# Patient Record
Sex: MALE | Race: WHITE | NOT HISPANIC OR LATINO | Employment: OTHER | URBAN - METROPOLITAN AREA
[De-identification: names, ages, dates, MRNs, and addresses within clinical notes are randomized per-mention and may not be internally consistent; named-entity substitution may affect disease eponyms.]

---

## 2017-01-09 RX ORDER — MELOXICAM 15 MG/1
TABLET ORAL
Qty: 90 TABLET | Refills: 2 | Status: SHIPPED | OUTPATIENT
Start: 2017-01-09 | End: 2017-01-16

## 2017-01-13 ENCOUNTER — OFFICE VISIT (OUTPATIENT)
Dept: FAMILY MEDICINE CLINIC | Facility: CLINIC | Age: 77
End: 2017-01-13

## 2017-01-13 VITALS
HEIGHT: 68 IN | SYSTOLIC BLOOD PRESSURE: 110 MMHG | WEIGHT: 209.4 LBS | DIASTOLIC BLOOD PRESSURE: 64 MMHG | BODY MASS INDEX: 31.74 KG/M2 | TEMPERATURE: 97.9 F | HEART RATE: 87 BPM | OXYGEN SATURATION: 96 % | RESPIRATION RATE: 16 BRPM

## 2017-01-13 DIAGNOSIS — R68.83 CHILLS: ICD-10-CM

## 2017-01-13 DIAGNOSIS — I95.9 HYPOTENSION, UNSPECIFIED: ICD-10-CM

## 2017-01-13 DIAGNOSIS — I10 ESSENTIAL HYPERTENSION: ICD-10-CM

## 2017-01-13 DIAGNOSIS — R42 DIZZINESS: Primary | ICD-10-CM

## 2017-01-13 LAB
BILIRUB BLD-MCNC: NEGATIVE MG/DL
CLARITY, POC: CLEAR
COLOR UR: YELLOW
GLUCOSE UR STRIP-MCNC: NEGATIVE MG/DL
HCT VFR BLDA CALC: 44.4 %
HGB BLDA-MCNC: 14.5 G/DL
KETONES UR QL: NEGATIVE
LEUKOCYTE EST, POC: NEGATIVE
MCH, POC: 30.3
MCHC, POC: 32.6
MCV, POC: 93
NITRITE UR-MCNC: NEGATIVE MG/ML
PH UR: 6 [PH] (ref 5–8)
PLATELET # BLD AUTO: 175 10*3/MM3
PMV BLD: 8.1 FL
PROT UR STRIP-MCNC: NEGATIVE MG/DL
RBC # UR STRIP: NEGATIVE /UL
RBC, POC: 4.77
RDW, POC: 12.5
SP GR UR: 1.01 (ref 1–1.03)
UROBILINOGEN UR QL: NORMAL
WBC # BLD: 6.7 10*3/UL

## 2017-01-13 PROCEDURE — 81003 URINALYSIS AUTO W/O SCOPE: CPT | Performed by: FAMILY MEDICINE

## 2017-01-13 PROCEDURE — 85027 COMPLETE CBC AUTOMATED: CPT | Performed by: FAMILY MEDICINE

## 2017-01-13 PROCEDURE — 71020 CHG CHEST X-RAY 2 VW: CPT | Performed by: FAMILY MEDICINE

## 2017-01-13 PROCEDURE — 99214 OFFICE O/P EST MOD 30 MIN: CPT | Performed by: FAMILY MEDICINE

## 2017-01-13 NOTE — PROGRESS NOTES
Subjective   Mikey Crawley is a 76 y.o. male.     Chief Complaint   Patient presents with   • Dizziness     shakes, no fever.  low b/p last p.m.        History of Present Illness    76-year-old male with a history of sick sinus syndrome, pacemaker, hypertension, presents with feeling dizziness and not quite himself for some time.  However over the last view days his blood pressure is been lower.  But 120/70 at times.  Sometimes a little bit lower.  He's had some dizziness when bending over and then standing back up.  Feeling lightheaded.  He is also over the last couple days has had severe episodes of shaking chills.  He has noted some changes in urination, little trouble going more than normal.  He's had no cough.  No cold or upper respiratory symptoms.  No nausea.  No vomiting.  He feels a little clammy at times.  No known fever.  He denies chest pain or trouble breathing.  He went to the dentist couple days ago.  He has some left upper tooth pain.  No sinus drainage.  No sinus congestion.  No postnasal drip.  He's had his pacemaker checked recently was normal.  Long history of hand arthritis symptoms.  He takes no back daily.  History of lower urinary tract symptoms.  He takes finasteride and tamsulosin.  History of hypertension.  Takes Tenoretic and Chikis.      The following portions of the patient's history were reviewed and updated as appropriate: allergies, current medications, past family history, past medical history, past social history, past surgical history and problem list.          Review of Systems   Constitutional: Positive for chills. Negative for fever.   HENT: Negative.    Respiratory: Negative for cough and shortness of breath.    Cardiovascular: Negative for chest pain, palpitations and leg swelling.   Gastrointestinal: Negative for abdominal pain, blood in stool, constipation, diarrhea, nausea and vomiting.   Endocrine: Positive for cold intolerance.   Genitourinary: Positive for difficulty  "urinating. Negative for dysuria and frequency.   Musculoskeletal: Positive for myalgias.   Skin: Negative.    Neurological: Positive for dizziness and light-headedness. Negative for weakness and headaches.   Psychiatric/Behavioral: Negative.        Objective   Blood pressure 110/64, pulse 87, temperature 97.9 °F (36.6 °C), temperature source Oral, resp. rate 16, height 67.5\" (171.5 cm), weight 209 lb 6.4 oz (95 kg), SpO2 96 %.  Physical Exam   Constitutional: He is oriented to person, place, and time. He appears distressed (slight diaphoresis looks tired and a little pale).   HENT:   Head: Normocephalic and atraumatic.   Mouth/Throat: Oropharynx is clear and moist.   Minimal pain to palpation maxillary sinus area.   Eyes: Conjunctivae and EOM are normal. Pupils are equal, round, and reactive to light.   Neck: Normal range of motion. Neck supple. No thyromegaly present.   Cardiovascular: Normal rate, regular rhythm and normal heart sounds.    No murmur heard.  Pulmonary/Chest: Effort normal and breath sounds normal. No respiratory distress. He has no wheezes. He has no rales. He exhibits no tenderness.   Abdominal: Soft. Bowel sounds are normal. He exhibits no distension and no mass. There is no tenderness. There is no rebound and no guarding.   Musculoskeletal: Normal range of motion. He exhibits no edema.   Lymphadenopathy:     He has no cervical adenopathy.   Neurological: He is alert and oriented to person, place, and time. No cranial nerve deficit. He exhibits normal muscle tone. Coordination normal.    Neurological exam.  Pupils equal and reactive to light.  Extra ocular muscle movements intact all directions.  Face symmetric.  Gait unremarkable.  No ataxia.  No dysmetria.  Negative [place]-Hallpike.  No nystagmus.     Skin: Skin is warm. He is diaphoretic. There is pallor (???).      blood pressure 114/64 sitting and 110/64 standing.  No change in pulse.    CBC within normal limits.  White count 6700.  Hemoglobin " 14.5.  Platelet count normal and 175,000.    Urinalysis today is negative.  Negative nitrites.  Negative leukocytes.  Negative protein.  No blood.    Procedures  Chest x-ray.  Indication chills and dizziness.  No prior to compare.  There is cardiomegaly noted.  Pacemaker is noted.  There is no infiltrate.  No effusion.  Bony structures appear normal.  Final report from radiology is pending.      Assessment/Plan   Mikey was seen today for dizziness.    Diagnoses and all orders for this visit:    Dizziness  -     POC Urinalysis Dipstick, Automated  -     POC CBC  -     XR Chest PA & Lateral    Chills  -     POC Urinalysis Dipstick, Automated  -     POC CBC  -     XR Chest PA & Lateral    Hypotension, unspecified    76-year-old male with a two-week history of nonspecific dizziness and a 2 day history of intermittent chills and shakes with relative hypotension.  On examination, evidence of diaphoresis but otherwise unremarkable physical examination.  CBC within normal limits.  Chest x-ray shows no acute disease.  Urinalysis normal.  At this time I believe his hypertension is overtreated.  I cannot completely rule out an occult pathological process.  From a cardiovascular review of systems and history, there is no evidence of acute coronary syndrome.  No signs of neurological process.  At this point I'm recommending stopping the Tenoretic.  Continuing the Chikis.  I'm recommending a follow-up with his primary care physician or myself in 72 hours.  I'm also recommending that the patient go to the emergency room or call 911 with headaches, chest pain, fever, severe shakes and chills, worsening lightheadedness, or other severe concerns.

## 2017-01-13 NOTE — PATIENT INSTRUCTIONS
Stop Tenoretic    Drink plenty of fluids and get rest this weekend    Go to the emergency room or call 911 with headaches, chest pain, fever, severe shakes and chills, worsening lightheadedness, or other severe concerns.

## 2017-01-13 NOTE — MR AVS SNAPSHOT
Mikey Crawley   1/13/2017 1:00 PM   Office Visit    Dept Phone:  [phone]   Encounter #:  [id number]    Provider:  Sotero Torres MD   Department:  [facility] PRIMARY CARE                Your Full Care Plan              Today's Medication Changes          These changes are accurate as of: 1/13/17  2:36 PM.  If you have any questions, ask your nurse or doctor.               Stop taking medication(s)listed here:     azithromycin 250 MG tablet   Commonly known as:  ZITHROMAX Z-ALIREZA   Stopped by:  Sotero Torres MD           dexamethasone 4 MG tablet   Commonly known as:  DECADRON   Stopped by:  Sotero Torres MD                      Your Updated Medication List          This list is accurate as of: 1/13/17  2:36 PM.  Always use your most recent med list.                atenolol-chlorthalidone 50-25 MG per tablet   Commonly known as:  TENORETIC   TAKE 1 TABLET DAILY       AMAYA 5-40 MG per tablet   Generic drug:  amlodipine-olmesartan   TAKE 1 TABLET DAILY       finasteride 5 MG tablet   Commonly known as:  PROSCAR       FLOMAX 0.4 MG capsule 24 hr capsule   Generic drug:  tamsulosin       meloxicam 15 MG tablet   Commonly known as:  MOBIC   TAKE 1 TABLET DAILY AS NEEDED FOR MILD PAIN (1-3)       rosuvastatin 5 MG tablet   Commonly known as:  CRESTOR   Take 1 tablet (5 mg total) by mouth 3 (three) times a week.               We Performed the Following     POC CBC     POC Urinalysis Dipstick, Automated     XR Chest PA & Lateral       You Were Diagnosed With        Codes Comments    Dizziness    -  Primary ICD-10-CM: R42  ICD-9-CM: 780.4     Chills     ICD-10-CM: R68.83  ICD-9-CM: 780.64     Hypotension, unspecified     ICD-10-CM: I95.9  ICD-9-CM: 458.9     Essential hypertension     ICD-10-CM: I10  ICD-9-CM: 401.9       Instructions    Stop Tenoretic    Drink plenty of fluids and get rest this weekend    Go to the emergency room or call 911 with headaches, chest pain,  "fever, severe shakes and chills, worsening lightheadedness, or other severe concerns.       Patient Instructions History      Upcoming Appointments     Visit Type Date Time Department    OFFICE VISIT 1/13/2017  1:00 PM [facility]    OFFICE VISIT 1/16/2017  9:45 AM [facility]    FOLLOW UP 5/30/2017 11:30 AM [facility]      [employer] Signup     Our records indicate that you have an active [facility] [employer] account.    You can view your After Visit Summary by going to [employer] and logging in with your [employer] username and password.  If you don't have a [employer] username and password but a parent or guardian has access to your record, the parent or guardian should login with their own [employer] username and password and access your record to view the After Visit Summary.    If you have questions, you can email [email or web address] or call [phone] to talk to our [employer] staff.  Remember, [employer] is NOT to be used for urgent needs.  For medical emergencies, dial 911.               Other Info from Your Visit           Your Appointments     Jan 16, 2017  9:45 AM EST   Office Visit with Sotero Torres MD   [facility] PRIMARY CARE (--)    [address]  [facility] [address] [phone]           Arrive 15 minutes prior to appointment.            May 30, 2017 11:30 AM EDT   Follow Up with Tyler Emerson MD   [facility] NEUROSURGERY (--)    [address]  [facility] [address] [phone]           Arrive 15 minutes prior to appointment.              Allergies     No Known Allergies      Reason for Visit     Dizziness shakes, no fever.  low b/p last p.m.      Vital Signs     Blood Pressure Pulse Temperature Respirations Height Weight    110/64 (Patient Position: Standing) 87 97.9 °F (36.6 °C) (Oral) 16 67.5\" (171.5 cm) 209 lb 6.4 oz (95 kg)    Oxygen Saturation Body Mass Index Smoking Status          "    96% 32.31 kg/m2 Current Some Day Smoker         Problems and Diagnoses Noted     High blood pressure    Dizziness    -  Primary    Chills        Low blood pressure          Results     POC Urinalysis Dipstick, Automated      Component Value Standard Range & Units    Color Yellow Yellow, Straw, Dark Yellow, Cyndi    Clarity, UA Clear Clear    Glucose, UA Negative Negative, 1000 mg/dL (3+) mg/dL    Bilirubin Negative Negative    Ketones, UA Negative Negative    Specific Gravity  1.010 1.005 - 1.030    Blood, UA Negative Negative    pH, Urine 6.0 5.0 - 8.0    Protein, POC Negative Negative mg/dL    Urobilinogen, UA Normal Normal    Leukocytes Negative Negative    Nitrite, UA Negative Negative                POC CBC      Component Value Standard Range & Units    Hematocrit 44.4 %    Hemoglobin 14.5 g/dL    RBC 4.77     WBC 6.7     MCV 93.0     MCH 30.3     MCHC 32.6     RDW-CV 12.5     Platelets 175 10*3/mm3    MPV 8.1

## 2017-01-15 LAB
BACTERIA UR CULT: NO GROWTH
BACTERIA UR CULT: NORMAL

## 2017-01-16 ENCOUNTER — OFFICE VISIT (OUTPATIENT)
Dept: FAMILY MEDICINE CLINIC | Facility: CLINIC | Age: 77
End: 2017-01-16

## 2017-01-16 VITALS
HEART RATE: 80 BPM | DIASTOLIC BLOOD PRESSURE: 78 MMHG | SYSTOLIC BLOOD PRESSURE: 122 MMHG | WEIGHT: 211.2 LBS | TEMPERATURE: 97.7 F | RESPIRATION RATE: 16 BRPM | HEIGHT: 68 IN | BODY MASS INDEX: 32.01 KG/M2

## 2017-01-16 DIAGNOSIS — M15.9 PRIMARY OSTEOARTHRITIS INVOLVING MULTIPLE JOINTS: ICD-10-CM

## 2017-01-16 DIAGNOSIS — I10 ESSENTIAL HYPERTENSION: Primary | ICD-10-CM

## 2017-01-16 DIAGNOSIS — E78.00 PURE HYPERCHOLESTEROLEMIA: ICD-10-CM

## 2017-01-16 DIAGNOSIS — R42 DIZZINESS: ICD-10-CM

## 2017-01-16 LAB
ALBUMIN SERPL-MCNC: 4.7 G/DL (ref 3.5–5.2)
ALBUMIN/GLOB SERPL: 1.8 G/DL
ALP SERPL-CCNC: 58 U/L (ref 39–117)
ALT SERPL-CCNC: 28 U/L (ref 1–41)
AST SERPL-CCNC: 22 U/L (ref 1–40)
BASOPHILS # BLD AUTO: 0.03 10*3/MM3 (ref 0–0.2)
BASOPHILS NFR BLD AUTO: 0.5 % (ref 0–1.5)
BILIRUB SERPL-MCNC: 0.6 MG/DL (ref 0.1–1.2)
BUN SERPL-MCNC: 19 MG/DL (ref 8–23)
BUN/CREAT SERPL: 15.3 (ref 7–25)
CALCIUM SERPL-MCNC: 9.7 MG/DL (ref 8.6–10.5)
CHLORIDE SERPL-SCNC: 102 MMOL/L (ref 98–107)
CHOLEST SERPL-MCNC: 134 MG/DL (ref 0–200)
CO2 SERPL-SCNC: 27.1 MMOL/L (ref 22–29)
CREAT SERPL-MCNC: 1.24 MG/DL (ref 0.76–1.27)
EOSINOPHIL # BLD AUTO: 0.37 10*3/MM3 (ref 0–0.7)
EOSINOPHIL NFR BLD AUTO: 6.4 % (ref 0.3–6.2)
ERYTHROCYTE [DISTWIDTH] IN BLOOD BY AUTOMATED COUNT: 12.4 % (ref 11.5–14.5)
GLOBULIN SER CALC-MCNC: 2.6 GM/DL
GLUCOSE SERPL-MCNC: 106 MG/DL (ref 65–99)
HCT VFR BLD AUTO: 44.4 % (ref 40.4–52.2)
HDLC SERPL-MCNC: 38 MG/DL (ref 40–60)
HGB BLD-MCNC: 15.2 G/DL (ref 13.7–17.6)
IMM GRANULOCYTES # BLD: 0 10*3/MM3 (ref 0–0.03)
IMM GRANULOCYTES NFR BLD: 0 % (ref 0–0.5)
LDLC SERPL CALC-MCNC: 70 MG/DL (ref 0–100)
LYMPHOCYTES # BLD AUTO: 2.17 10*3/MM3 (ref 0.9–4.8)
LYMPHOCYTES NFR BLD AUTO: 37.5 % (ref 19.6–45.3)
MCH RBC QN AUTO: 32.1 PG (ref 27–32.7)
MCHC RBC AUTO-ENTMCNC: 34.2 G/DL (ref 32.6–36.4)
MCV RBC AUTO: 93.9 FL (ref 79.8–96.2)
MONOCYTES # BLD AUTO: 0.46 10*3/MM3 (ref 0.2–1.2)
MONOCYTES NFR BLD AUTO: 7.9 % (ref 5–12)
NEUTROPHILS # BLD AUTO: 2.76 10*3/MM3 (ref 1.9–8.1)
NEUTROPHILS NFR BLD AUTO: 47.7 % (ref 42.7–76)
PLATELET # BLD AUTO: 195 10*3/MM3 (ref 140–500)
POTASSIUM SERPL-SCNC: 4.1 MMOL/L (ref 3.5–5.2)
PROT SERPL-MCNC: 7.3 G/DL (ref 6–8.5)
RBC # BLD AUTO: 4.73 10*6/MM3 (ref 4.6–6)
SODIUM SERPL-SCNC: 143 MMOL/L (ref 136–145)
TRIGL SERPL-MCNC: 128 MG/DL (ref 0–150)
VLDLC SERPL CALC-MCNC: 25.6 MG/DL (ref 5–40)
WBC # BLD AUTO: 5.79 10*3/MM3 (ref 4.5–10.7)

## 2017-01-16 PROCEDURE — 99214 OFFICE O/P EST MOD 30 MIN: CPT | Performed by: FAMILY MEDICINE

## 2017-01-16 NOTE — PROGRESS NOTES
"Subjective   Mikey Crawley is a 76 y.o. male.     Chief Complaint   Patient presents with   • Hypertension        History of Present Illness    Follow-up episode of hypotension last week.  Long history of hypertension also pacemaker for sick sinus syndrome.  He was having some dizziness and shaking episodes.  He was having some vertigo symptoms at times related to head position, however the exam last week no nystagmus negative Claudia-Hallpike.  We stopped his Tenoretic.  This weekend he is feeling much better.  More energy level.  Less shakes.  Blood pressures running normal.  No low blood pressure episodes.  He has a history of osteoarthritis of multiple joints.  He stopped the meloxicam and my advice.    History of hyperlipidemia.  He is overdue for lipid panel.  Also due for other blood work.    The following portions of the patient's history were reviewed and updated as appropriate: allergies, current medications, past family history, past medical history, past social history, past surgical history and problem list.          Review of Systems   Constitutional: Negative for activity change and appetite change.   Respiratory: Negative for chest tightness and shortness of breath.    Cardiovascular: Negative for chest pain, palpitations and leg swelling.   Neurological: Negative for dizziness and headaches.   Psychiatric/Behavioral: Negative for confusion.       Objective   Blood pressure 122/78, pulse 80, temperature 97.7 °F (36.5 °C), temperature source Oral, resp. rate 16, height 67.5\" (171.5 cm), weight 211 lb 3.2 oz (95.8 kg), peak flow 98 L/min.  Physical Exam   Constitutional: He is oriented to person, place, and time. He appears well-developed and well-nourished. No distress.   Eyes: EOM are normal. Pupils are equal, round, and reactive to light.   Neck: No thyromegaly present.   Cardiovascular: Normal rate, regular rhythm, normal heart sounds and intact distal pulses.    Pulmonary/Chest: Effort normal and " breath sounds normal.   Musculoskeletal: He exhibits no edema.   Neurological: He is alert and oriented to person, place, and time. No cranial nerve deficit. Coordination normal.   Skin: Skin is warm and dry.   Psychiatric: He has a normal mood and affect. His behavior is normal. Judgment and thought content normal.   Nursing note and vitals reviewed.      Assessment/Plan   Mikey was seen today for hypertension.    Diagnoses and all orders for this visit:    Essential hypertension  -     Comprehensive Metabolic Panel  -     Lipid Panel    Pure hypercholesterolemia  -     Comprehensive Metabolic Panel  -     Lipid Panel    Dizziness  -     CBC & Differential  -     Ambulatory Referral to Physical Therapy Vestibular, Evaluate and treat    Primary osteoarthritis involving multiple joints      Hypertension.  Now with better control.  The relative hypotension has resolved.  He looks better.  His color is better.  He is no longer diaphoretic.  He will stay off the Tenoretic.  He will continue the camila.  I'll see him back in 2 weeks for recheck.  Blood work today.  Next    Dizziness.  Intermittent.  Probably secondary to his previous hypotension.  However there is some 9 positional vertigo-like quality to this.  I'm recommending a vestibular physical therapy evaluation.    Hyperlipidemia.  He continues his statin without complaint.  Checking lipid panel and CMP today.    Osteoarthritis multiple joints, especially the hands.  Stop daily meloxicam.  Over-the-counter Tylenol 2 tablets twice a day.  He may use meloxicam occasionally for pain a few times a month.  But not every day.

## 2017-01-16 NOTE — MR AVS SNAPSHOT
Mikey Crawley   1/16/2017 9:45 AM   Office Visit    Dept Phone:  [phone]   Encounter #:  [id number]    Provider:  Sotero Torres MD   Department:  [facility] PRIMARY CARE                Your Full Care Plan              Today's Medication Changes          These changes are accurate as of: 1/16/17 10:25 AM.  If you have any questions, ask your nurse or doctor.               Stop taking medication(s)listed here:     atenolol-chlorthalidone 50-25 MG per tablet   Commonly known as:  TENORETIC   Stopped by:  Sotero Torres MD           meloxicam 15 MG tablet   Commonly known as:  MOBIC   Stopped by:  Sotero Torres MD                      Your Updated Medication List          This list is accurate as of: 1/16/17 10:25 AM.  Always use your most recent med list.                AMAYA 5-40 MG per tablet   Generic drug:  amlodipine-olmesartan   TAKE 1 TABLET DAILY       finasteride 5 MG tablet   Commonly known as:  PROSCAR       FLOMAX 0.4 MG capsule 24 hr capsule   Generic drug:  tamsulosin       rosuvastatin 5 MG tablet   Commonly known as:  CRESTOR   Take 1 tablet (5 mg total) by mouth 3 (three) times a week.               We Performed the Following     Ambulatory Referral to Physical Therapy Vestibular, Evaluate and treat     CBC & Differential     Comprehensive Metabolic Panel     Lipid Panel       You Were Diagnosed With        Codes Comments    Essential hypertension    -  Primary ICD-10-CM: I10  ICD-9-CM: 401.9     Pure hypercholesterolemia     ICD-10-CM: E78.00  ICD-9-CM: 272.0     Dizziness     ICD-10-CM: R42  ICD-9-CM: 780.4     Primary osteoarthritis involving multiple joints     ICD-10-CM: M15.0  ICD-9-CM: 715.09       Instructions     None    Patient Instructions History      Upcoming Appointments     Visit Type Date Time Department    OFFICE VISIT 1/16/2017  9:45 AM [facility]    FOLLOW UP 5/30/2017 11:30 AM [facility] [employer] Signup     "   Our records indicate that you have an active [facility] [employer] account.    You can view your After Visit Summary by going to [employer] and logging in with your [employer] username and password.  If you don't have a [employer] username and password but a parent or guardian has access to your record, the parent or guardian should login with their own [employer] username and password and access your record to view the After Visit Summary.    If you have questions, you can email [email or web address] or call [phone] to talk to our [employer] staff.  Remember, [employer] is NOT to be used for urgent needs.  For medical emergencies, dial 911.               Other Info from Your Visit           Your Appointments     May 30, 2017 11:30 AM EDT   Follow Up with Tyler Emerson MD   [facility] NEUROSURGERY (--)    [address]  [facility] [address] [phone]           Arrive 15 minutes prior to appointment.              Allergies     No Known Allergies      Reason for Visit     Hypertension           Vital Signs     Blood Pressure Pulse Temperature Respirations Height Weight    122/78 80 97.7 °F (36.5 °C) (Oral) 16 67.5\" (171.5 cm) 211 lb 3.2 oz (95.8 kg)    Peak Flow Body Mass Index Smoking Status             98 L/min 32.59 kg/m2 Current Some Day Smoker         Problems and Diagnoses Noted     Dizziness    High blood pressure    High cholesterol or triglycerides    Osteoarthritis (arthritis due to wear and tear of joints)        "

## 2017-01-19 ENCOUNTER — TREATMENT (OUTPATIENT)
Dept: PHYSICAL THERAPY | Facility: CLINIC | Age: 77
End: 2017-01-19

## 2017-01-19 DIAGNOSIS — R42 DIZZINESS: Primary | ICD-10-CM

## 2017-01-19 PROCEDURE — G8981 BODY POS CURRENT STATUS: HCPCS | Performed by: PHYSICAL THERAPIST

## 2017-01-19 PROCEDURE — 97161 PT EVAL LOW COMPLEX 20 MIN: CPT | Performed by: PHYSICAL THERAPIST

## 2017-01-19 PROCEDURE — G8982 BODY POS GOAL STATUS: HCPCS | Performed by: PHYSICAL THERAPIST

## 2017-01-19 NOTE — PROGRESS NOTES
Physical Therapy Initial Evaluation-  Vestibular Therapy      Patient Name: Mikey Crawley       Patient MRN: [id number]  : 1940  Physician:Sotero Torres MD  Date: 2017    Encounter Diagnoses   Name Primary?   • Dizziness Yes     Objective Testing:  Positional Testing  Positional Testing: With infrared goggles  Vertebrobasilar Artery Screen - Right: Negative  Vertebrobasilar Artery Screen - Left: Negative  [place]-Hallpike Right: No nystagmus  [place]-Hallpike Left: No nystagmus  Horizontal Roll Test Right: No nystagmus  Horizontal Roll Test Left: No nystagmus     THERAPY ASSESSMENT: Patient is a 76 y.o. male. Patient presents to physical therapy due to complaints of dizziness and imbalance. Signs and symptoms are consistent with possible vestibular sensitivity. Oculomotor and BPPV testing was negative. Patient was given a HEP today to perform independently for a week. If patient continues to have dizziness further vestibular testing will be performed including CDP.  Patient is a good candidate for physical therapy to address the following:  Functional Limitations: Walking, Difficulty moving, Decreased ability to perform ADL's   Length of Therapy: 1 month   PT Frequency: Recheck with PT 1 week   PT Interventions: Balance Training, Home Exercise Program, Oculomotor training  Patient Agrees with Plan of Care: Yes    REHAB POTENTIAL: good            SHORT TERM GOALS: To be met in 2 weeks:  1. Patient is independent with HEP given today.       LONG TERM GOALS:To be met in 4 weeks:  1. Patient will report decreased disequilibrium/dizziness by at least 90%.  2. Patient will report no loss of balance with ADLs to demonstrate improved functional balance.  3. Patient denies dizziness with daily activity.    NMR 47277 5 minutes    Timed Code Treatment: 5   Minutes     Total Treatment Time: 40      Minutes      PT SIGNATURE: Zuri Nair PT   DATE TREATMENT INITIATED: 2017    Medicare Initial  Certification  Certification Period: 4/19/2017  I certify that the therapy services are furnished while this patient is under my care.  The services outlined above are required by this patient, and will be reviewed every 90 days.     PHYSICIAN: Sotero Torres MD      DATE:     Please sign and return via fax to [phone].. Thank you, [facility].

## 2017-01-30 ENCOUNTER — OFFICE VISIT (OUTPATIENT)
Dept: FAMILY MEDICINE CLINIC | Facility: CLINIC | Age: 77
End: 2017-01-30

## 2017-01-30 VITALS
WEIGHT: 216 LBS | DIASTOLIC BLOOD PRESSURE: 80 MMHG | OXYGEN SATURATION: 95 % | HEIGHT: 68 IN | SYSTOLIC BLOOD PRESSURE: 132 MMHG | TEMPERATURE: 97.9 F | BODY MASS INDEX: 32.74 KG/M2 | RESPIRATION RATE: 16 BRPM | HEART RATE: 75 BPM

## 2017-01-30 DIAGNOSIS — I10 ESSENTIAL HYPERTENSION: ICD-10-CM

## 2017-01-30 DIAGNOSIS — E78.00 PURE HYPERCHOLESTEROLEMIA: ICD-10-CM

## 2017-01-30 DIAGNOSIS — R53.83 FATIGUE, UNSPECIFIED TYPE: ICD-10-CM

## 2017-01-30 DIAGNOSIS — R42 DIZZINESS: Primary | ICD-10-CM

## 2017-01-30 LAB
CK SERPL-CCNC: 134 U/L (ref 20–200)
TSH SERPL DL<=0.005 MIU/L-ACNC: 3.95 MIU/ML (ref 0.27–4.2)

## 2017-01-30 PROCEDURE — 99214 OFFICE O/P EST MOD 30 MIN: CPT | Performed by: FAMILY MEDICINE

## 2017-01-30 NOTE — MR AVS SNAPSHOT
Mikey Crawley   1/30/2017 10:00 AM   Office Visit    Dept Phone:  [phone]   Encounter #:  [id number]    Provider:  Sotero Torres MD   Department:  [facility] PRIMARY CARE                Your Full Care Plan              Your Updated Medication List          This list is accurate as of: 1/30/17 10:35 AM.  Always use your most recent med list.                AMAYA 5-40 MG per tablet   Generic drug:  amlodipine-olmesartan   TAKE 1 TABLET DAILY       finasteride 5 MG tablet   Commonly known as:  PROSCAR       FLOMAX 0.4 MG capsule 24 hr capsule   Generic drug:  tamsulosin       rosuvastatin 5 MG tablet   Commonly known as:  CRESTOR   Take 1 tablet (5 mg total) by mouth 3 (three) times a week.               We Performed the Following     CK     TSH Rfx On Abnormal To Free T4       You Were Diagnosed With        Codes Comments    Dizziness    -  Primary ICD-10-CM: R42  ICD-9-CM: 780.4     Fatigue, unspecified type     ICD-10-CM: R53.83  ICD-9-CM: 780.79     Pure hypercholesterolemia     ICD-10-CM: E78.00  ICD-9-CM: 272.0     Essential hypertension     ICD-10-CM: I10  ICD-9-CM: 401.9       Instructions     None    Patient Instructions History      Upcoming Appointments     Visit Type Date Time Department    OFFICE VISIT 1/30/2017 10:00 AM [facility]    FOLLOW UP 5/30/2017 11:30 AM [facility]      [employer] Signup     Our records indicate that you have an active [employer] account.    You can view your After Visit Summary by going to [employer] and logging in with your [employer] username and password.  If you don't have a [employer] username and password but a parent or guardian has access to your record, the parent or guardian should login with their own [employer] username and password and access your record to view the After Visit Summary.    If you have questions, you can email [email or web address] or call [phone] to talk  "to our MyChart staff.  Remember, MyChart is NOT to be used for urgent needs.  For medical emergencies, dial 911.               Other Info from Your Visit           Your Appointments     May 30, 2017 11:30 AM EDT   Follow Up with Tyler Emerson MD   [facility] NEUROSURGERY (--)    [address]  [facility] [id number] [phone]           Arrive 15 minutes prior to appointment.              Allergies     No Known Allergies      Reason for Visit     Dizziness           Vital Signs     Blood Pressure Pulse Temperature Respirations Height Weight    132/80 75 97.9 °F (36.6 °C) (Oral) 16 67.5\" (171.5 cm) 216 lb (98 kg)    Oxygen Saturation Body Mass Index Smoking Status             95% 33.33 kg/m2 Current Some Day Smoker         Problems and Diagnoses Noted     Dizziness    High blood pressure    High cholesterol or triglycerides    Tiredness            "

## 2017-02-20 ENCOUNTER — DOCUMENTATION (OUTPATIENT)
Dept: PHYSICAL THERAPY | Facility: CLINIC | Age: 77
End: 2017-02-20

## 2017-02-20 DIAGNOSIS — R42 DIZZINESS: Primary | ICD-10-CM

## 2017-02-20 NOTE — PROGRESS NOTES
Discharge Report    Patient Name: Mikey Crawley       Patient MRN: [id number]  : 1940  Physician:MD Diya  Date: 2017    Encounter Diagnoses   Name Primary?   • Dizziness Yes       Treatment has included HEP.    Assessment: Patient returned to MD on 2017 and dizziness was resolved.   Progress towards goals: All Met    Discharge Reason: Patient achieved goals      Plan of Care: Continue with current home exercise program as instructed    Prognosis: excellent    Understanding at Discharge: excellent

## 2017-03-08 RX ORDER — AMLODIPINE AND OLMESARTAN MEDOXOMIL 5; 40 MG/1; MG/1
TABLET ORAL
Qty: 90 TABLET | Refills: 2 | Status: SHIPPED | OUTPATIENT
Start: 2017-03-08 | End: 2017-12-03 | Stop reason: SDUPTHER

## 2017-03-21 DIAGNOSIS — I10 HYPERTENSION, ESSENTIAL: ICD-10-CM

## 2017-03-21 RX ORDER — ATENOLOL AND CHLORTHALIDONE TABLET 50; 25 MG/1; MG/1
TABLET ORAL
Qty: 90 TABLET | Refills: 1 | Status: SHIPPED | OUTPATIENT
Start: 2017-03-21 | End: 2017-04-10

## 2017-03-31 RX ORDER — ROSUVASTATIN CALCIUM 5 MG/1
TABLET, COATED ORAL
Qty: 90 TABLET | Refills: 2 | Status: SHIPPED | OUTPATIENT
Start: 2017-03-31 | End: 2018-06-28 | Stop reason: SDUPTHER

## 2017-04-10 ENCOUNTER — CLINICAL SUPPORT NO REQUIREMENTS (OUTPATIENT)
Dept: CARDIOLOGY | Facility: CLINIC | Age: 77
End: 2017-04-10

## 2017-04-10 ENCOUNTER — OFFICE VISIT (OUTPATIENT)
Dept: CARDIOLOGY | Facility: CLINIC | Age: 77
End: 2017-04-10

## 2017-04-10 VITALS
SYSTOLIC BLOOD PRESSURE: 142 MMHG | BODY MASS INDEX: 32.13 KG/M2 | HEIGHT: 68 IN | DIASTOLIC BLOOD PRESSURE: 84 MMHG | WEIGHT: 212 LBS | HEART RATE: 65 BPM

## 2017-04-10 DIAGNOSIS — I49.5 SICK SINUS SYNDROME (HCC): Primary | ICD-10-CM

## 2017-04-10 DIAGNOSIS — R42 DIZZINESS: ICD-10-CM

## 2017-04-10 DIAGNOSIS — I49.5 SSS (SICK SINUS SYNDROME) (HCC): Primary | ICD-10-CM

## 2017-04-10 PROCEDURE — 93000 ELECTROCARDIOGRAM COMPLETE: CPT | Performed by: INTERNAL MEDICINE

## 2017-04-10 PROCEDURE — 99213 OFFICE O/P EST LOW 20 MIN: CPT | Performed by: INTERNAL MEDICINE

## 2017-04-10 PROCEDURE — 93280 PM DEVICE PROGR EVAL DUAL: CPT | Performed by: INTERNAL MEDICINE

## 2017-04-10 RX ORDER — MELOXICAM 15 MG/1
15 TABLET ORAL DAILY
COMMUNITY
Start: 2017-04-09 | End: 2018-04-09

## 2017-04-10 NOTE — PROGRESS NOTES
Subjective:     Encounter Date:04/10/2017      Patient ID: Mikey Crawley is a 76 y.o. male.    Chief Complaint: dizziness, SSS    History of Present Illness     Dear Dr. Torres:      I had the pleasure of seeing the patient in cardiac follow-up today.  As you well know, he is a josh, 76-year-old man with history of sick sinus syndrome status post implantation of a permanent pacemaker.  He has had some chronic neck problems but does not think he is a good surgical candidate.  He complains of dizziness.  He states that, if he turns his head a certain way, he will get dizzy and feel like he might lose consciousness.  He also gets the same feeling if he gets up too quickly.  This symptom only lasts for a few seconds but he says it is quite bothersome to him.      He had his pacemaker checked today and it is functioning well.  He is exercising at least three times a week without much limitation.          Review of Systems   All other systems reviewed and are negative.        ECG 12 Lead  Date/Time: 4/10/2017 5:17 PM  Performed by: INA NEAL  Authorized by: INA NEAL   Comparison: compared with previous ECG   Similar to previous ECG  Rhythm comments: A-paced  BPM: 65  QRS axis: left  Other findings: PRWP               Objective:     Physical Exam   Constitutional: He is oriented to person, place, and time. He appears well-developed and well-nourished.   HENT:   Head: Normocephalic and atraumatic.   Neck: Normal range of motion. Neck supple.   Cardiovascular: Normal rate, regular rhythm and normal heart sounds.    Pulmonary/Chest: Effort normal and breath sounds normal.   Abdominal: Soft. Bowel sounds are normal.   Musculoskeletal: Normal range of motion.   Neurological: He is alert and oriented to person, place, and time.   Skin: Skin is warm and dry.   Psychiatric: He has a normal mood and affect. His behavior is normal. Thought content normal.   Vitals reviewed.      Lab Review:       Assessment:           Diagnosis Plan   1. SSS (sick sinus syndrome)     2. Dizziness            Plan:        It was a pleasure to see the patient in cardiac follow-up today.  He is doing well from the cardiac standpoint without any problems with his pacemaker.  He does have some symptoms of dizziness when he turns his head or gets up too quickly.  In my mind, they are not very limiting given that he is capable of significant exercise but he is unhappy with this.      He stopped his Tenoretic hoping that his blood pressure would stay good and that his symptoms would improve.  So far, there has not been much improvement over several months.      I would not change his blood pressure medicines further at this time because I think he risks getting hypertensive if he stops his current treatment.  He will see me again in one year or sooner if symptoms warrant.

## 2017-05-17 ENCOUNTER — TRANSCRIBE ORDERS (OUTPATIENT)
Dept: GASTROENTEROLOGY | Facility: CLINIC | Age: 77
End: 2017-05-17

## 2017-05-17 DIAGNOSIS — Z86.010 HX OF ADENOMATOUS COLONIC POLYPS: Primary | ICD-10-CM

## 2017-05-17 DIAGNOSIS — D33.7: Primary | ICD-10-CM

## 2017-05-23 ENCOUNTER — HOSPITAL ENCOUNTER (OUTPATIENT)
Dept: MRI IMAGING | Facility: HOSPITAL | Age: 77
Discharge: HOME | End: 2017-05-23
Attending: NEUROLOGICAL SURGERY | Admitting: NEUROLOGICAL SURGERY

## 2017-05-23 VITALS
RESPIRATION RATE: 18 BRPM | DIASTOLIC BLOOD PRESSURE: 81 MMHG | SYSTOLIC BLOOD PRESSURE: 162 MMHG | OXYGEN SATURATION: 99 % | TEMPERATURE: 97.3 F | HEART RATE: 63 BPM

## 2017-05-23 DIAGNOSIS — D33.7: ICD-10-CM

## 2017-05-23 LAB — CREAT BLDA-MCNC: 1.1 MG/DL (ref 0.6–1.3)

## 2017-05-23 PROCEDURE — 72156 MRI NECK SPINE W/O & W/DYE: CPT

## 2017-05-23 PROCEDURE — 82565 ASSAY OF CREATININE: CPT

## 2017-05-23 PROCEDURE — 0 GADOBENATE DIMEGLUMINE 529 MG/ML SOLUTION: Performed by: NEUROLOGICAL SURGERY

## 2017-05-23 PROCEDURE — A9577 INJ MULTIHANCE: HCPCS | Performed by: NEUROLOGICAL SURGERY

## 2017-05-23 RX ADMIN — GADOBENATE DIMEGLUMINE 20 ML: 529 INJECTION, SOLUTION INTRAVENOUS at 13:20

## 2017-05-30 ENCOUNTER — OFFICE VISIT (OUTPATIENT)
Dept: NEUROSURGERY | Facility: CLINIC | Age: 77
End: 2017-05-30

## 2017-05-30 VITALS
DIASTOLIC BLOOD PRESSURE: 81 MMHG | SYSTOLIC BLOOD PRESSURE: 150 MMHG | BODY MASS INDEX: 31.39 KG/M2 | WEIGHT: 200 LBS | HEIGHT: 67 IN | HEART RATE: 74 BPM

## 2017-05-30 DIAGNOSIS — D33.7: Primary | ICD-10-CM

## 2017-05-30 PROCEDURE — 99213 OFFICE O/P EST LOW 20 MIN: CPT | Performed by: NEUROLOGICAL SURGERY

## 2017-06-12 ENCOUNTER — OUTSIDE FACILITY SERVICE (OUTPATIENT)
Dept: GASTROENTEROLOGY | Facility: CLINIC | Age: 77
End: 2017-06-12

## 2017-06-12 PROCEDURE — 45380 COLONOSCOPY AND BIOPSY: CPT | Performed by: INTERNAL MEDICINE

## 2017-06-21 ENCOUNTER — TELEPHONE (OUTPATIENT)
Dept: GASTROENTEROLOGY | Facility: CLINIC | Age: 77
End: 2017-06-21

## 2017-06-21 NOTE — TELEPHONE ENCOUNTER
----- Message from Nick JONES MD sent at 6/21/2017 12:05 PM EDT -----  Regarding: RE: Procedure / Path  Okay to call results, recommend follow-up colonoscopy in 3 years office follow up sooner as needed  ----- Message -----     From: Pavan Garcia     Sent: 6/21/2017  10:27 AM       To: Nick JONES MD  Subject: Procedure / Path                                 Scanned in

## 2017-06-21 NOTE — TELEPHONE ENCOUNTER
Called pt and spoke with pt's wife Rosemarie who is on hippa form and advised per Dr Mcpherson that the colon polyp that was removed was not cancerous but was precancerous and he recommends a repeat c/s in 3 yrs and to call sooner if needed. Pt's wife verb understanding.     C/s placed in recall for 06/12/2020.

## 2017-07-10 ENCOUNTER — CLINICAL SUPPORT NO REQUIREMENTS (OUTPATIENT)
Dept: CARDIOLOGY | Facility: CLINIC | Age: 77
End: 2017-07-10

## 2017-07-10 DIAGNOSIS — I49.5 SICK SINUS SYNDROME (HCC): Primary | ICD-10-CM

## 2017-07-10 PROCEDURE — 93294 REM INTERROG EVL PM/LDLS PM: CPT | Performed by: INTERNAL MEDICINE

## 2017-07-10 PROCEDURE — 93296 REM INTERROG EVL PM/IDS: CPT | Performed by: INTERNAL MEDICINE

## 2017-09-17 DIAGNOSIS — I10 HYPERTENSION, ESSENTIAL: ICD-10-CM

## 2017-09-18 RX ORDER — ATENOLOL AND CHLORTHALIDONE TABLET 50; 25 MG/1; MG/1
TABLET ORAL
Qty: 90 TABLET | Refills: 1 | Status: SHIPPED | OUTPATIENT
Start: 2017-09-18 | End: 2019-01-03 | Stop reason: ALTCHOICE

## 2017-10-06 RX ORDER — MELOXICAM 15 MG/1
TABLET ORAL
Qty: 90 TABLET | Refills: 2 | OUTPATIENT
Start: 2017-10-06 | End: 2018-05-25

## 2017-10-12 ENCOUNTER — CLINICAL SUPPORT NO REQUIREMENTS (OUTPATIENT)
Dept: CARDIOLOGY | Facility: CLINIC | Age: 77
End: 2017-10-12

## 2017-10-12 DIAGNOSIS — I49.5 SINOATRIAL NODE DYSFUNCTION (HCC): Primary | ICD-10-CM

## 2017-10-12 PROCEDURE — 93280 PM DEVICE PROGR EVAL DUAL: CPT | Performed by: INTERNAL MEDICINE

## 2017-12-04 RX ORDER — AMLODIPINE AND OLMESARTAN MEDOXOMIL 5; 40 MG/1; MG/1
TABLET ORAL
Qty: 90 TABLET | Refills: 1 | Status: SHIPPED | OUTPATIENT
Start: 2017-12-04 | End: 2018-06-02 | Stop reason: SDUPTHER

## 2018-01-08 ENCOUNTER — CLINICAL SUPPORT NO REQUIREMENTS (OUTPATIENT)
Dept: CARDIOLOGY | Facility: CLINIC | Age: 78
End: 2018-01-08

## 2018-01-08 DIAGNOSIS — I49.5 SICK SINUS SYNDROME (HCC): Primary | ICD-10-CM

## 2018-01-08 PROCEDURE — 93294 REM INTERROG EVL PM/LDLS PM: CPT | Performed by: INTERNAL MEDICINE

## 2018-01-08 PROCEDURE — 93296 REM INTERROG EVL PM/IDS: CPT | Performed by: INTERNAL MEDICINE

## 2018-03-17 DIAGNOSIS — I10 HYPERTENSION, ESSENTIAL: ICD-10-CM

## 2018-03-19 RX ORDER — ATENOLOL AND CHLORTHALIDONE TABLET 50; 25 MG/1; MG/1
TABLET ORAL
Qty: 90 TABLET | Refills: 0 | OUTPATIENT
Start: 2018-03-19

## 2018-03-21 ENCOUNTER — TELEPHONE (OUTPATIENT)
Dept: CARDIOLOGY | Facility: CLINIC | Age: 78
End: 2018-03-21

## 2018-03-21 NOTE — TELEPHONE ENCOUNTER
Patient is scheduled for shoulder surgery with Dr. Jacinto Maldonado on 4/10 @ [facility] & is needing clearance.    Last seen in the office 4/2017 & is scheduled for f/u in April.    Per verbal from Dr. Rodriguez, patient is clear for surgery.    Patient informed and S/C form faxed to Dr Maldonado's office at [phone].

## 2018-04-09 ENCOUNTER — OFFICE VISIT (OUTPATIENT)
Dept: CARDIOLOGY | Facility: CLINIC | Age: 78
End: 2018-04-09

## 2018-04-09 ENCOUNTER — CLINICAL SUPPORT NO REQUIREMENTS (OUTPATIENT)
Dept: CARDIOLOGY | Facility: CLINIC | Age: 78
End: 2018-04-09

## 2018-04-09 VITALS
WEIGHT: 206 LBS | OXYGEN SATURATION: 99 % | HEART RATE: 60 BPM | DIASTOLIC BLOOD PRESSURE: 82 MMHG | BODY MASS INDEX: 32.33 KG/M2 | HEIGHT: 67 IN | SYSTOLIC BLOOD PRESSURE: 138 MMHG

## 2018-04-09 DIAGNOSIS — I49.5 SICK SINUS SYNDROME (HCC): Primary | ICD-10-CM

## 2018-04-09 DIAGNOSIS — I49.5 SICK SINUS SYNDROME (HCC): ICD-10-CM

## 2018-04-09 PROBLEM — M25.511 RIGHT SHOULDER PAIN: Status: ACTIVE | Noted: 2018-03-26

## 2018-04-09 PROCEDURE — 93000 ELECTROCARDIOGRAM COMPLETE: CPT | Performed by: PHYSICIAN ASSISTANT

## 2018-04-09 PROCEDURE — 93280 PM DEVICE PROGR EVAL DUAL: CPT | Performed by: INTERNAL MEDICINE

## 2018-04-09 PROCEDURE — 99213 OFFICE O/P EST LOW 20 MIN: CPT | Performed by: PHYSICIAN ASSISTANT

## 2018-04-09 NOTE — PROGRESS NOTES
Date of Office Visit: 2018  Encounter Provider: DARLINE Cole  Place of Service: [facility] CARDIOLOGY  Patient Name: Mikey Crawley  :1940    Chief Complaint   Patient presents with   • Hypertension     6 month follow up   :     HPI: Mikey Crawley is a 77 y.o. male, new to me, who presents today for follow-up.  Old records have been obtained and reviewed by me.  He is a patient of Dr. Rodriguez's with a past cardiac history significant for sick sinus syndrome status post permanent pacemaker implantation.  He was last in our office to see Dr. Rodriguez on 4/10/2017.  At that visit he was doing well without complaints of angina or heart failure.  He did have some complaints of lightheadedness if he turns his head too quickly.  No changes were made to his medical regimen, and he's here today for yearly visit.  He did have his pacemaker interrogated today.  He is atrially and ventricularly paced.  He has not had any episodes since his last check in January.   Over the past year he's been doing well.  He has no complaints of angina or heart failure.  He denies any chest pain, shortness of breath, palpitations, edema, dizziness, or syncope.  At the beginning of February this year he fell on the ice and injured his right rotator cuff.  He is having surgery tomorrow.      Past Medical History:   Diagnosis Date   • Bradycardia    • Diverticulosis    • Hyperlipidemia    • Hypertension    • Lesion of vertebra    • Obstructive sleep apnea     cpap   • Osteoarthritis    • Polyp of sigmoid colon    • Sick sinus syndrome    • Sinus bradycardia        Past Surgical History:   Procedure Laterality Date   • BACK SURGERY     • CHOLECYSTECTOMY     • HEMORRHOIDECTOMY     • KNEE SURGERY     • PACEMAKER IMPLANTATION      dual chamber   • TONSILLECTOMY AND ADENOIDECTOMY         Social History     Social History   • Marital status:      Spouse name: N/A   • Number of children: 3   • Years of  education: 12     Occupational History   • RETIRED      Social History Main Topics   • Smoking status: Former Smoker     Types: Cigars   • Smokeless tobacco: Never Used      Comment: OCC CIGAR   • Alcohol use 1.2 oz/week     1 Cans of beer, 1 Shots of liquor per week      Comment: social   • Drug use: No   • Sexual activity: Defer     Other Topics Concern   • Not on file     Social History Narrative   • No narrative on file       Family History   Problem Relation Age of Onset   • Cancer Father    • Emphysema Mother    • Glaucoma Mother    • No Known Problems Maternal Grandmother    • No Known Problems Maternal Grandfather    • No Known Problems Paternal Grandmother    • No Known Problems Paternal Grandfather        Review of Systems   Constitution: Negative for chills, fever and malaise/fatigue.   Cardiovascular: Negative for chest pain, dyspnea on exertion, leg swelling, near-syncope, orthopnea, palpitations, paroxysmal nocturnal dyspnea and syncope.   Respiratory: Negative for cough and shortness of breath.    Musculoskeletal: Positive for joint pain. Negative for joint swelling and myalgias.   Gastrointestinal: Negative for abdominal pain, diarrhea, melena, nausea and vomiting.   Genitourinary: Negative for frequency and hematuria.   Neurological: Negative for light-headedness, numbness, paresthesias and seizures.   Allergic/Immunologic: Negative.    All other systems reviewed and are negative.      No Known Allergies      Current Outpatient Prescriptions:   •  amlodipine-olmesartan (AMAYA) 5-40 MG per tablet, TAKE 1 TABLET DAILY, Disp: 90 tablet, Rfl: 1  •  atenolol-chlorthalidone (TENORETIC) 50-25 MG per tablet, TAKE 1 TABLET DAILY, Disp: 90 tablet, Rfl: 1  •  finasteride (PROSCAR) 5 MG tablet, Take 1 tablet by mouth daily., Disp: , Rfl:   •  meloxicam (MOBIC) 15 MG tablet, TAKE 1 TABLET DAILY AS NEEDED FOR MILD PAIN (1 THROUGH 3), Disp: 90 tablet, Rfl: 2  •  rosuvastatin (CRESTOR) 5 MG tablet, TAKE 1 TABLET  "THREE TIMES A WEEK, Disp: 90 tablet, Rfl: 2  •  tamsulosin (FLOMAX) 0.4 MG capsule, Take 1 capsule by mouth 2 (two) times a day., Disp: , Rfl:       Objective:     Vitals:    04/09/18 0832   BP: 138/82   BP Location: Left arm   Pulse: 60   SpO2: 99%   Weight: 93.4 kg (206 lb)   Height: 170.2 cm (67\")     Body mass index is 32.26 kg/m².    PHYSICAL EXAM:    Physical Exam   Constitutional: He is oriented to person, place, and time. He appears well-developed and well-nourished. No distress.   HENT:   Head: Normocephalic and atraumatic.   Eyes: Pupils are equal, round, and reactive to light.   Neck: No JVD present. No thyromegaly present.   Cardiovascular: Normal rate, regular rhythm, normal heart sounds and intact distal pulses.    No murmur heard.  Pulmonary/Chest: Effort normal and breath sounds normal. No respiratory distress.   Abdominal: Soft. Bowel sounds are normal. He exhibits no distension. There is no splenomegaly or hepatomegaly. There is no tenderness.   Musculoskeletal: Normal range of motion. He exhibits no edema.   Reduced movement right arm secondary to rotator cuff injury.   Neurological: He is alert and oriented to person, place, and time.   Skin: Skin is warm and dry. He is not diaphoretic. No erythema.   Psychiatric: He has a normal mood and affect. His behavior is normal. Judgment normal.         ECG 12 Lead  Date/Time: 4/9/2018 9:17 AM  Performed by: CARLOS EDUARDO VOGT.  Authorized by: CARLOS EDUARDO VOGT.   Comparison: compared with previous ECG from 4/10/2017  Similar to previous ECG  BPM: 60  Clinical impression: abnormal ECG  Comments: Indication: Sick sinus syndrome.              Assessment:       Diagnosis Plan   1. Sick sinus syndrome  ECG 12 Lead     Orders Placed This Encounter   Procedures   • ECG 12 Lead     This order was created via procedure documentation          Plan:       Overall he's doing great.  He has no complaint of angina or heart failure.  His pacemaker was interrogated today, " and is functioning well.  I'm not going to make any changes to his medical regimen today.  His blood pressure is well-controlled.  He will follow-up with Dr. Rodriguez in 1 year or sooner if needed.      As always, it has been a pleasure to participate in your patient's care.      Sincerely,         Rhea Frost PA-C

## 2018-05-17 NOTE — PROGRESS NOTES
Subjective   Patient ID: Mikey Crawley is a 77 y.o. male is here today for follow-up is here today for one-year follow-up of neck pain with new MRI of the cervical spine. He has had some increase neck/ shoulder pain however he recently had shoulder surgery and is currently in physical therapy for that and it is improving.     History of Present Illness    This patient returns today.  He is doing okay overall.  He does have some pain in his neck but he recently had shoulder surgery and his neck pain seems to be getting better.    The following portions of the patient's history were reviewed and updated as appropriate: allergies, current medications, past family history, past medical history, past social history, past surgical history and problem list.    Review of Systems   Respiratory: Negative for chest tightness and shortness of breath.    Cardiovascular: Negative for chest pain.   Musculoskeletal: Positive for arthralgias and neck pain.   All other systems reviewed and are negative.      Objective   Physical Exam   Constitutional: He is oriented to person, place, and time. He appears well-developed and well-nourished.   Neurological: He is oriented to person, place, and time.     Neurologic Exam     Mental Status   Oriented to person, place, and time.       Assessment/Plan   Independent Review of Radiographic Studies:      I reviewed an MRI of his cervical spine done on 24 May.  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  This is unchanged in size.    Medical Decision Making:      I told the patient that we should continue to follow this as long as his health is generally okay.  We will plan to scan him again in one year.    Mikey was seen today for neck pain.    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

## 2018-05-24 ENCOUNTER — HOSPITAL ENCOUNTER (OUTPATIENT)
Dept: MRI IMAGING | Facility: HOSPITAL | Age: 78
Discharge: HOME | End: 2018-05-24
Attending: NEUROLOGICAL SURGERY | Admitting: NEUROLOGICAL SURGERY

## 2018-05-24 VITALS
SYSTOLIC BLOOD PRESSURE: 117 MMHG | DIASTOLIC BLOOD PRESSURE: 59 MMHG | OXYGEN SATURATION: 95 % | HEART RATE: 69 BPM | RESPIRATION RATE: 18 BRPM

## 2018-05-24 DIAGNOSIS — D33.7: ICD-10-CM

## 2018-05-24 LAB — CREAT BLDA-MCNC: 1.2 MG/DL (ref 0.6–1.3)

## 2018-05-24 PROCEDURE — A9577 INJ MULTIHANCE: HCPCS | Performed by: NEUROLOGICAL SURGERY

## 2018-05-24 PROCEDURE — 82565 ASSAY OF CREATININE: CPT

## 2018-05-24 PROCEDURE — 0 GADOBENATE DIMEGLUMINE 529 MG/ML SOLUTION: Performed by: NEUROLOGICAL SURGERY

## 2018-05-24 PROCEDURE — 72156 MRI NECK SPINE W/O & W/DYE: CPT

## 2018-05-24 RX ADMIN — GADOBENATE DIMEGLUMINE 20 ML: 529 INJECTION, SOLUTION INTRAVENOUS at 14:51

## 2018-05-24 NOTE — NURSING NOTE
Pt here for MRI, has pacemaker.  [employer] rep here to reprogram pacer for MRI, will be monitored by Prosper PATINO

## 2018-05-24 NOTE — NURSING NOTE
Back from MRI waiting in triage for [employer] rep to reprogram pacer.  Drinking orange juice, no c/o

## 2018-05-29 ENCOUNTER — OFFICE VISIT (OUTPATIENT)
Dept: NEUROSURGERY | Facility: CLINIC | Age: 78
End: 2018-05-29

## 2018-05-29 VITALS — HEART RATE: 73 BPM | DIASTOLIC BLOOD PRESSURE: 73 MMHG | SYSTOLIC BLOOD PRESSURE: 132 MMHG

## 2018-05-29 DIAGNOSIS — D33.7: Primary | ICD-10-CM

## 2018-05-29 PROCEDURE — 99213 OFFICE O/P EST LOW 20 MIN: CPT | Performed by: NEUROLOGICAL SURGERY

## 2018-06-04 RX ORDER — AMLODIPINE AND OLMESARTAN MEDOXOMIL 5; 40 MG/1; MG/1
TABLET ORAL
Qty: 90 TABLET | Refills: 3 | Status: SHIPPED | OUTPATIENT
Start: 2018-06-04 | End: 2019-06-02 | Stop reason: SDUPTHER

## 2018-06-25 RX ORDER — ROSUVASTATIN CALCIUM 5 MG/1
5 TABLET, COATED ORAL 3 TIMES WEEKLY
Qty: 90 TABLET | Refills: 0 | OUTPATIENT
Start: 2018-06-25

## 2018-06-28 RX ORDER — ROSUVASTATIN CALCIUM 5 MG/1
5 TABLET, COATED ORAL 3 TIMES WEEKLY
Qty: 30 TABLET | Refills: 0 | Status: SHIPPED | OUTPATIENT
Start: 2018-06-29 | End: 2018-12-26

## 2018-06-28 RX ORDER — ROSUVASTATIN CALCIUM 5 MG/1
5 TABLET, COATED ORAL 3 TIMES WEEKLY
Qty: 90 TABLET | Refills: 0 | Status: SHIPPED | OUTPATIENT
Start: 2018-06-29 | End: 2018-06-28 | Stop reason: SDUPTHER

## 2018-07-03 RX ORDER — MELOXICAM 15 MG/1
15 TABLET ORAL DAILY
Qty: 90 TABLET | Refills: 0 | OUTPATIENT
Start: 2018-07-03

## 2018-07-09 ENCOUNTER — CLINICAL SUPPORT NO REQUIREMENTS (OUTPATIENT)
Dept: CARDIOLOGY | Facility: CLINIC | Age: 78
End: 2018-07-09

## 2018-07-09 DIAGNOSIS — I49.5 SICK SINUS SYNDROME (HCC): Primary | ICD-10-CM

## 2018-07-09 PROCEDURE — 93296 REM INTERROG EVL PM/IDS: CPT | Performed by: INTERNAL MEDICINE

## 2018-07-09 PROCEDURE — 93294 REM INTERROG EVL PM/LDLS PM: CPT | Performed by: INTERNAL MEDICINE

## 2018-10-11 ENCOUNTER — CLINICAL SUPPORT NO REQUIREMENTS (OUTPATIENT)
Dept: CARDIOLOGY | Facility: CLINIC | Age: 78
End: 2018-10-11

## 2018-10-11 DIAGNOSIS — I49.5 SICK SINUS SYNDROME (HCC): Primary | ICD-10-CM

## 2018-10-11 PROCEDURE — 93280 PM DEVICE PROGR EVAL DUAL: CPT | Performed by: INTERNAL MEDICINE

## 2018-10-25 ENCOUNTER — FLU SHOT (OUTPATIENT)
Dept: FAMILY MEDICINE CLINIC | Facility: CLINIC | Age: 78
End: 2018-10-25

## 2018-10-25 DIAGNOSIS — Z23 NEED FOR IMMUNIZATION AGAINST INFLUENZA: Primary | ICD-10-CM

## 2018-10-25 PROCEDURE — 90662 IIV NO PRSV INCREASED AG IM: CPT | Performed by: FAMILY MEDICINE

## 2018-10-25 PROCEDURE — G0008 ADMIN INFLUENZA VIRUS VAC: HCPCS | Performed by: FAMILY MEDICINE

## 2018-12-26 RX ORDER — ROSUVASTATIN CALCIUM 5 MG/1
TABLET, COATED ORAL
Qty: 90 TABLET | Refills: 0 | Status: SHIPPED | OUTPATIENT
Start: 2018-12-26 | End: 2019-06-24 | Stop reason: SDUPTHER

## 2018-12-26 NOTE — TELEPHONE ENCOUNTER
I approve 90 day refill.  Needs appointment in the next 3 months including medicare wellness visit

## 2019-01-03 RX ORDER — CHLORTHALIDONE 25 MG/1
25 TABLET ORAL DAILY
Qty: 90 TABLET | Refills: 2 | Status: SHIPPED | OUTPATIENT
Start: 2019-01-03 | End: 2019-04-17

## 2019-01-03 RX ORDER — ATENOLOL 50 MG/1
50 TABLET ORAL DAILY
Qty: 90 TABLET | Refills: 2 | Status: SHIPPED | OUTPATIENT
Start: 2019-01-03 | End: 2019-09-15 | Stop reason: SDUPTHER

## 2019-01-03 NOTE — TELEPHONE ENCOUNTER
Patient called stating that tenoretic is no longer available, per his insurance company, and is requesting an alternative.    Per verbal from Dr. Rodriguez, tenoretic is a combination drug, so he recommended just sending in the individual medications.    Patient has been informed, and scripts for atenolol and chlorthalidone sent in separately to [facility].

## 2019-01-10 ENCOUNTER — CLINICAL SUPPORT NO REQUIREMENTS (OUTPATIENT)
Dept: CARDIOLOGY | Facility: CLINIC | Age: 79
End: 2019-01-10

## 2019-01-10 DIAGNOSIS — I49.5 SICK SINUS SYNDROME (HCC): Primary | ICD-10-CM

## 2019-01-10 PROCEDURE — 93294 REM INTERROG EVL PM/LDLS PM: CPT | Performed by: INTERNAL MEDICINE

## 2019-01-10 PROCEDURE — 93296 REM INTERROG EVL PM/IDS: CPT | Performed by: INTERNAL MEDICINE

## 2019-01-23 DIAGNOSIS — E78.00 PURE HYPERCHOLESTEROLEMIA: Primary | ICD-10-CM

## 2019-01-23 DIAGNOSIS — I10 ESSENTIAL HYPERTENSION: ICD-10-CM

## 2019-01-24 LAB
ALBUMIN SERPL-MCNC: 4.3 G/DL (ref 3.5–5.2)
ALBUMIN/GLOB SERPL: 1.7 G/DL
ALP SERPL-CCNC: 49 U/L (ref 39–117)
ALT SERPL-CCNC: 30 U/L (ref 1–41)
AST SERPL-CCNC: 23 U/L (ref 1–40)
BILIRUB SERPL-MCNC: 0.4 MG/DL (ref 0.1–1.2)
BUN SERPL-MCNC: 17 MG/DL (ref 8–23)
BUN/CREAT SERPL: 14.4 (ref 7–25)
CALCIUM SERPL-MCNC: 9.1 MG/DL (ref 8.6–10.5)
CHLORIDE SERPL-SCNC: 102 MMOL/L (ref 98–107)
CHOLEST SERPL-MCNC: 133 MG/DL (ref 0–200)
CO2 SERPL-SCNC: 28.4 MMOL/L (ref 22–29)
CREAT SERPL-MCNC: 1.18 MG/DL (ref 0.76–1.27)
GLOBULIN SER CALC-MCNC: 2.6 GM/DL
GLUCOSE SERPL-MCNC: 106 MG/DL (ref 65–99)
HDLC SERPL-MCNC: 38 MG/DL (ref 40–60)
LDLC SERPL CALC-MCNC: 72 MG/DL (ref 0–100)
POTASSIUM SERPL-SCNC: 4 MMOL/L (ref 3.5–5.2)
PROT SERPL-MCNC: 6.9 G/DL (ref 6–8.5)
SODIUM SERPL-SCNC: 144 MMOL/L (ref 136–145)
TRIGL SERPL-MCNC: 113 MG/DL (ref 0–150)
VLDLC SERPL CALC-MCNC: 22.6 MG/DL (ref 5–40)

## 2019-01-31 ENCOUNTER — OFFICE VISIT (OUTPATIENT)
Dept: FAMILY MEDICINE CLINIC | Facility: CLINIC | Age: 79
End: 2019-01-31

## 2019-01-31 VITALS
SYSTOLIC BLOOD PRESSURE: 141 MMHG | OXYGEN SATURATION: 95 % | HEART RATE: 65 BPM | BODY MASS INDEX: 34.26 KG/M2 | TEMPERATURE: 97.8 F | HEIGHT: 67 IN | DIASTOLIC BLOOD PRESSURE: 74 MMHG | WEIGHT: 218.3 LBS

## 2019-01-31 DIAGNOSIS — I49.5 SICK SINUS SYNDROME (HCC): ICD-10-CM

## 2019-01-31 DIAGNOSIS — I10 ESSENTIAL HYPERTENSION: ICD-10-CM

## 2019-01-31 DIAGNOSIS — D33.7: ICD-10-CM

## 2019-01-31 DIAGNOSIS — M15.9 PRIMARY OSTEOARTHRITIS INVOLVING MULTIPLE JOINTS: ICD-10-CM

## 2019-01-31 DIAGNOSIS — E78.00 PURE HYPERCHOLESTEROLEMIA: ICD-10-CM

## 2019-01-31 DIAGNOSIS — Z00.00 MEDICARE ANNUAL WELLNESS VISIT, SUBSEQUENT: Primary | ICD-10-CM

## 2019-01-31 PROBLEM — R42 DIZZINESS: Status: RESOLVED | Noted: 2017-01-16 | Resolved: 2019-01-31

## 2019-01-31 PROCEDURE — 90670 PCV13 VACCINE IM: CPT | Performed by: FAMILY MEDICINE

## 2019-01-31 PROCEDURE — 99214 OFFICE O/P EST MOD 30 MIN: CPT | Performed by: FAMILY MEDICINE

## 2019-01-31 PROCEDURE — G0439 PPPS, SUBSEQ VISIT: HCPCS | Performed by: FAMILY MEDICINE

## 2019-01-31 PROCEDURE — G0009 ADMIN PNEUMOCOCCAL VACCINE: HCPCS | Performed by: FAMILY MEDICINE

## 2019-01-31 RX ORDER — MELOXICAM 15 MG/1
15 TABLET ORAL DAILY
COMMUNITY
Start: 2017-04-09 | End: 2019-04-15 | Stop reason: SDUPTHER

## 2019-01-31 NOTE — PROGRESS NOTES
Subjective   Mikey Crawley is a 78 y.o. male.     Medicare Wellness-subsequent (follow up labs)    History of Present Illness    Hypertension follow up. Doing well with current medication which he is taking as directed. No known high or low blood pressure episodes.  However he is not checking his blood pressure very often.  No cardiovascular or neurological symptoms. Today's BP: 141/74.  Has previous lower urinary tract symptoms but doing well with Proscar.  He states the chlorthalidone does not bother him.    Hyperlipidemia follow up. He is taking statin medication without complaint. No myopathy symptoms.  Continues Crestor without trouble.    Last lipid panel:   Lab Results   Component Value Date    HDL 38 (L) 01/24/2019     Lab Results   Component Value Date    LDL 72 01/24/2019     Lab Results   Component Value Date    TRIG 113 01/24/2019     Sick sinus syndrome.  Has pacemaker.  Followed by cardiology also.    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  Followed by neurosurgeon..  They checked yearly.  Most recent report I saw was at things are going well.  He has no neurological symptoms such as headache neck pain or focal neurological deficits.    Bilateral foot pain.  Over a year.   had in the past.  When he stands up it bothers him.  After sleeping on it long or after being sitting for a while.  On the entire bottom of his feet.  No numbness.  No burning.    The following portions of the patient's history were reviewed and updated as appropriate: allergies, current medications, past family history, past medical history, past social history, past surgical history and problem list.      Review of Systems   Constitutional: Negative.    Respiratory: Negative.    Cardiovascular: Negative.    Musculoskeletal: Negative for joint swelling.   Neurological: Negative for weakness and numbness.       Objective   Blood pressure 141/74, pulse 65, temperature 97.8 °F (36.6 °C), temperature source Oral, height 170.2  "cm (67.01\"), weight 99 kg (218 lb 4.8 oz), SpO2 95 %.  Physical Exam   Constitutional: He appears well-developed and well-nourished. No distress.   Neck: No thyromegaly present.   Cardiovascular: Normal rate, regular rhythm, normal heart sounds and intact distal pulses.   Pulmonary/Chest: Effort normal and breath sounds normal.   Musculoskeletal: He exhibits no edema.   Examination the bilateral feet reveal no lesions.  No pain to palpation.  Good dorsalis pedis pulses.  No pain with ambulation.  No pain over palpation of the plantar fascia.   Skin: Skin is warm and dry.   Psychiatric: He has a normal mood and affect. His behavior is normal. Judgment and thought content normal.   Nursing note and vitals reviewed.      Assessment/Plan   Mikey was seen today for medicare wellness-subsequent.    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  Fair control.  Continue current medication.  Check at home.  See me in 6 months.  Sooner if they stay high.    Hyperlipidemia.  Continue Crestor.    Sick sinus syndrome.  Now controlled with pacemaker.  Followed by cardiology.    Benign neoplasm of the nervous system.  Followed by neurosurgery.  Reportedly stable.  Asymptomatic.    Probable osteoarthritis of the feet.  Possible plantar fasciitis.  Recommend this point continuing the heel inserts.  Tylenol for pain.  If getting worse will need further evaluation.  Less likely neuropathy but cannot be excluded.    Follow-up in 6 months for recheck.         "

## 2019-01-31 NOTE — PROGRESS NOTES
QUICK REFERENCE INFORMATION:  The ABCs of the Annual Wellness Visit    Subsequent Medicare Wellness Visit    HEALTH RISK ASSESSMENT    1940    Recent Hospitalizations:  No hospitalization(s) within the last year..        Current Medical Providers:  Patient Care Team:  Sotero Torres MD as PCP - General (Family Medicine)  Dima Alvarado MD as Consulting Physician (Orthopedic Surgery)  Jacinto Maldonado MD as Consulting Physician (Orthopedic Surgery)        Smoking Status:  Social History     Tobacco Use   Smoking Status Former Smoker   • Types: Cigars   Smokeless Tobacco Never Used   Tobacco Comment    OCC CIGAR       Alcohol Consumption:  Social History     Substance and Sexual Activity   Alcohol Use Yes   • Alcohol/week: 1.2 oz   • Types: 1 Cans of beer, 1 Shots of liquor per week    Comment: social       Depression Screen:   PHQ-2/PHQ-9 Depression Screening 1/31/2019   Little interest or pleasure in doing things 0   Feeling down, depressed, or hopeless 0   Total Score 0       Health Habits and Functional and Cognitive Screening:  Functional & Cognitive Status 1/31/2019   Do you have difficulty preparing food and eating? No   Do you have difficulty bathing yourself, getting dressed or grooming yourself? No   Do you have difficulty using the toilet? No   Do you have difficulty moving around from place to place? No   Do you have trouble with steps or getting out of a bed or a chair? No   In the past year have you fallen or experienced a near fall? Yes   Current Diet Well Balanced Diet   Dental Exam Not up to date   Eye Exam Up to date   Exercise (times per week) 0 times per week   Current Exercise Activities Include None   Do you need help using the phone?  No   Are you deaf or do you have serious difficulty hearing?  Yes   Do you need help with transportation? No   Do you need help shopping? No   Do you need help preparing meals?  No   Do you need help with housework?  No   Do you need help with laundry? No    Do you need help taking your medications? No   Do you need help managing money? No   Do you ever drive or ride in a car without wearing a seat belt? No   Have you felt unusual stress, anger or loneliness in the last month? Yes   Who do you live with? Spouse   If you need help, do you have trouble finding someone available to you? No   Have you been bothered in the last four weeks by sexual problems? No   Do you have difficulty concentrating, remembering or making decisions? No           Does the patient have evidence of cognitive impairment? No    Aspirin use counseling: Does not need ASA (and currently is not on it)      Recent Lab Results:  CMP:  Lab Results   Component Value Date     (H) 01/24/2019    BUN 17 01/24/2019    CREATININE 1.18 01/24/2019    EGFRIFNONA 60 (L) 01/24/2019    EGFRIFAFRI 72 01/24/2019    BCR 14.4 01/24/2019     01/24/2019    K 4.0 01/24/2019    CO2 28.4 01/24/2019    CALCIUM 9.1 01/24/2019    PROTENTOTREF 6.9 01/24/2019    ALBUMIN 4.30 01/24/2019    LABGLOBREF 2.6 01/24/2019    LABIL2 1.7 01/24/2019    BILITOT 0.4 01/24/2019    ALKPHOS 49 01/24/2019    AST 23 01/24/2019    ALT 30 01/24/2019     Lipid Panel:  Lab Results   Component Value Date    TRIG 113 01/24/2019    HDL 38 (L) 01/24/2019    VLDL 22.6 01/24/2019    LDLHDL 1.7 11/30/2015     HbA1c:       Visual Acuity:  No exam data present    Age-appropriate Screening Schedule:  Refer to the list below for future screening recommendations based on patient's age, sex and/or medical conditions. Orders for these recommended tests are listed in the plan section. The patient has been provided with a written plan.    Health Maintenance   Topic Date Due   • TDAP/TD VACCINES (1 - Tdap) 06/18/1959   • PNEUMOCOCCAL VACCINES (65+ LOW/MEDIUM RISK) (1 of 2 - PCV13) 06/18/2005   • ZOSTER VACCINE (2 of 3) 01/28/2014   • LIPID PANEL  01/24/2020   • COLONOSCOPY  06/12/2020   • INFLUENZA VACCINE  Completed        Subjective   History of  Present Illness    Mikey Crawley is a 78 y.o. male who presents for an Subsequent Wellness Visit.    The following portions of the patient's history were reviewed and updated as appropriate: allergies, current medications, past family history, past medical history, past social history, past surgical history and problem list.    Outpatient Medications Prior to Visit   Medication Sig Dispense Refill   • amlodipine-olmesartan (AMAYA) 5-40 MG per tablet TAKE 1 TABLET DAILY 90 tablet 3   • atenolol (TENORMIN) 50 MG tablet Take 1 tablet by mouth Daily. 90 tablet 2   • chlorthalidone (HYGROTON) 25 MG tablet Take 1 tablet by mouth Daily. 90 tablet 2   • finasteride (PROSCAR) 5 MG tablet Take 1 tablet by mouth daily.     • meloxicam (MOBIC) 15 MG tablet Take 15 mg by mouth Daily.     • rosuvastatin (CRESTOR) 5 MG tablet TAKE 1 TABLET THREE TIMES A WEEK 90 tablet 0   • tamsulosin (FLOMAX) 0.4 MG capsule Take 1 capsule by mouth 2 (two) times a day.       No facility-administered medications prior to visit.        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 Have asked the patient to send this to us to add to record.    Identification of Risk Factors:  Risk factors include: cardiovascular risk.    Review of Systems    Compared to one year ago, the patient feels his physical health is the same.  Compared to one year ago, the patient feels his mental health is the same.    Objective     Physical Exam    Vitals:    01/31/19 1008   BP: 141/74   Pulse: 65   Temp: 97.8 °F (36.6 °C)   TempSrc: Oral  "  SpO2: 95%   Weight: 99 kg (218 lb 4.8 oz)   Height: 170.2 cm (67.01\")       Patient's Body mass index is 34.18 kg/m². BMI is above normal parameters. Recommendations include: exercise counseling.      Assessment/Plan   Patient Self-Management and Personalized Health Advice  The patient has been provided with information about: exercise, prevention of cardiac or vascular disease, designing advance directives and mental health concerns and preventive services including:   · Advance directive, Pneumococcal vaccine .    Visit Diagnoses:    ICD-10-CM ICD-9-CM   1. Medicare annual wellness visit, subsequent Z00.00 V70.0   2. Essential hypertension I10 401.9   3. Pure hypercholesterolemia E78.00 272.0   4. Sick sinus syndrome (CMS/HCC) I49.5 427.81       No orders of the defined types were placed in this encounter.      Outpatient Encounter Medications as of 1/31/2019   Medication Sig Dispense Refill   • amlodipine-olmesartan (AMAYA) 5-40 MG per tablet TAKE 1 TABLET DAILY 90 tablet 3   • atenolol (TENORMIN) 50 MG tablet Take 1 tablet by mouth Daily. 90 tablet 2   • chlorthalidone (HYGROTON) 25 MG tablet Take 1 tablet by mouth Daily. 90 tablet 2   • finasteride (PROSCAR) 5 MG tablet Take 1 tablet by mouth daily.     • meloxicam (MOBIC) 15 MG tablet Take 15 mg by mouth Daily.     • rosuvastatin (CRESTOR) 5 MG tablet TAKE 1 TABLET THREE TIMES A WEEK 90 tablet 0   • tamsulosin (FLOMAX) 0.4 MG capsule Take 1 capsule by mouth 2 (two) times a day.       No facility-administered encounter medications on file as of 1/31/2019.        Reviewed use of high risk medication in the elderly: yes  Reviewed for potential of harmful drug interactions in the elderly: yes    Follow Up:  No Follow-up on file.     An After Visit Summary and PPPS with all of these plans were given to the patient.         "

## 2019-04-04 RX ORDER — MELOXICAM 15 MG/1
15 TABLET ORAL DAILY
Qty: 90 TABLET | Refills: 0 | OUTPATIENT
Start: 2019-04-04

## 2019-04-15 RX ORDER — MELOXICAM 15 MG/1
15 TABLET ORAL DAILY
Qty: 90 TABLET | Refills: 1 | Status: SHIPPED | OUTPATIENT
Start: 2019-04-15 | End: 2019-10-12 | Stop reason: SDUPTHER

## 2019-04-17 ENCOUNTER — CLINICAL SUPPORT NO REQUIREMENTS (OUTPATIENT)
Dept: CARDIOLOGY | Facility: CLINIC | Age: 79
End: 2019-04-17

## 2019-04-17 ENCOUNTER — OFFICE VISIT (OUTPATIENT)
Dept: CARDIOLOGY | Facility: CLINIC | Age: 79
End: 2019-04-17

## 2019-04-17 VITALS
SYSTOLIC BLOOD PRESSURE: 138 MMHG | BODY MASS INDEX: 34.53 KG/M2 | DIASTOLIC BLOOD PRESSURE: 76 MMHG | WEIGHT: 220 LBS | HEIGHT: 67 IN | HEART RATE: 66 BPM

## 2019-04-17 DIAGNOSIS — I49.5 SICK SINUS SYNDROME (HCC): Primary | ICD-10-CM

## 2019-04-17 DIAGNOSIS — I10 ESSENTIAL HYPERTENSION: ICD-10-CM

## 2019-04-17 PROCEDURE — 99213 OFFICE O/P EST LOW 20 MIN: CPT | Performed by: INTERNAL MEDICINE

## 2019-04-17 PROCEDURE — 93000 ELECTROCARDIOGRAM COMPLETE: CPT | Performed by: INTERNAL MEDICINE

## 2019-04-17 PROCEDURE — 93280 PM DEVICE PROGR EVAL DUAL: CPT | Performed by: INTERNAL MEDICINE

## 2019-05-22 ENCOUNTER — APPOINTMENT (OUTPATIENT)
Dept: MRI IMAGING | Facility: HOSPITAL | Age: 79
End: 2019-05-22
Attending: NEUROLOGICAL SURGERY

## 2019-06-03 DIAGNOSIS — D33.7: Primary | ICD-10-CM

## 2019-06-03 RX ORDER — AMLODIPINE AND OLMESARTAN MEDOXOMIL 5; 40 MG/1; MG/1
TABLET ORAL
Qty: 90 TABLET | Refills: 3 | Status: SHIPPED | OUTPATIENT
Start: 2019-06-03 | End: 2020-06-16 | Stop reason: SDUPTHER

## 2019-06-03 NOTE — PROGRESS NOTES
Patient is due for his 1 year follow up MRI and appointment. He previously had an appointment for his MRI however he cancelled it.

## 2019-06-24 RX ORDER — ROSUVASTATIN CALCIUM 5 MG/1
TABLET, COATED ORAL
Qty: 90 TABLET | Refills: 0 | Status: SHIPPED | OUTPATIENT
Start: 2019-06-24 | End: 2019-12-23

## 2019-07-19 ENCOUNTER — CLINICAL SUPPORT NO REQUIREMENTS (OUTPATIENT)
Dept: CARDIOLOGY | Facility: CLINIC | Age: 79
End: 2019-07-19

## 2019-07-19 DIAGNOSIS — I49.5 SICK SINUS SYNDROME (HCC): Primary | ICD-10-CM

## 2019-07-19 PROCEDURE — 93296 REM INTERROG EVL PM/IDS: CPT | Performed by: INTERNAL MEDICINE

## 2019-07-19 PROCEDURE — 93294 REM INTERROG EVL PM/LDLS PM: CPT | Performed by: INTERNAL MEDICINE

## 2019-07-30 ENCOUNTER — OFFICE VISIT (OUTPATIENT)
Dept: FAMILY MEDICINE CLINIC | Facility: CLINIC | Age: 79
End: 2019-07-30

## 2019-07-30 VITALS
BODY MASS INDEX: 33.71 KG/M2 | HEART RATE: 72 BPM | OXYGEN SATURATION: 95 % | WEIGHT: 214.8 LBS | SYSTOLIC BLOOD PRESSURE: 130 MMHG | DIASTOLIC BLOOD PRESSURE: 76 MMHG | TEMPERATURE: 98.8 F | HEIGHT: 67 IN

## 2019-07-30 DIAGNOSIS — D33.7: ICD-10-CM

## 2019-07-30 DIAGNOSIS — M15.9 PRIMARY OSTEOARTHRITIS INVOLVING MULTIPLE JOINTS: ICD-10-CM

## 2019-07-30 DIAGNOSIS — E78.00 PURE HYPERCHOLESTEROLEMIA: ICD-10-CM

## 2019-07-30 DIAGNOSIS — I10 ESSENTIAL HYPERTENSION: Primary | ICD-10-CM

## 2019-07-30 PROCEDURE — 99214 OFFICE O/P EST MOD 30 MIN: CPT | Performed by: FAMILY MEDICINE

## 2019-07-30 RX ORDER — CHLORTHALIDONE 25 MG/1
25 TABLET ORAL DAILY
COMMUNITY
Start: 2019-01-03 | End: 2019-09-16 | Stop reason: SDUPTHER

## 2019-07-30 NOTE — PROGRESS NOTES
"Subjective   Mikey Crawley is a 79 y.o. male.     Chief Complaint   Patient presents with   • Hypertension     pt is fasting        History of Present Illness    Hypertension follow-up.  Patient continues amlodipine on losartan, atenolol, chlorthalidone, and tamsulosin.  He also takes the tamsulosin and Proscar together for his lower urinary tract symptoms.  Followed by urology.  He is been checking his blood pressure at home.  Running normal but 130 systolic.  He does have some lightheadedness when he first sits up fast, especially if he is been bending over in the garden or playing golf.  But otherwise no neurological or cardiovascular symptoms.    He has a stable as of 2018 right C1-C2 probable schwannoma.  Followed by neurology.  He has no radiculopathy symptoms or right-sided neck symptoms.    Hyperlipidemia.  Patient continues Crestor at low-dose 5 mg 3 times a week.  His lipid panel few months ago was excellent.    Osteoarthritis of multiple joints.  He takes meloxicam 15 mg daily.  We have tried to slow it down and he had worsening arthritis symptoms that became problematic.  He was off of the for about a month and he had more severe symptoms.          The following portions of the patient's history were reviewed and updated as appropriate: allergies, current medications, past family history, past medical history, past social history, past surgical history and problem list.          Review of Systems   Constitutional: Negative.    Respiratory: Negative.    Cardiovascular: Negative.    Musculoskeletal: Negative.        Objective   Blood pressure 130/76, pulse 72, temperature 98.8 °F (37.1 °C), temperature source Oral, height 170.2 cm (67.01\"), weight 97.4 kg (214 lb 12.8 oz), SpO2 95 %.  Physical Exam   Constitutional: He appears well-developed and well-nourished. No distress.   Neck: No thyromegaly present.   Cardiovascular: Normal rate, regular rhythm, normal heart sounds and intact distal pulses. "   Pulmonary/Chest: Effort normal and breath sounds normal.   Musculoskeletal: He exhibits no edema.   Skin: Skin is warm and dry.   Psychiatric: He has a normal mood and affect. His behavior is normal. Judgment and thought content normal.   Nursing note and vitals reviewed.      Assessment/Plan   Mikey was seen today for hypertension.    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  Overall good control.  Continue medication as is.  He is having occasional lightheadedness when he bends over and stands up fast.  That said his systolic blood pressure at home is 130.  No change in medicine.  He will drink plenty of water, especially when out in the heat.  Follow-up in 6 months.    Hyperlipidemia.  Continue Crestor 3 times a week.    Probable schwannoma C1-C2 followed by neurology.  Is been stable on MRI scan for the last number of years.  He will follow-up with the neurosurgeon as scheduled.    Osteoarthritis of multiple joints.  He continues meloxicam daily.  Patient is aware that long-term meloxicam use may increase risk of kidney problems, heart attacks, bleeding problems, or other issues.  His creatinine has been normal.  I recommend he go down every other day or 2 days on, 1 day off.  He understands were looking for the minimal dose possible with the longest duration between doses as possible.

## 2019-07-31 LAB
ALBUMIN SERPL-MCNC: 4.3 G/DL (ref 3.5–5.2)
ALBUMIN/GLOB SERPL: 1.4 G/DL
ALP SERPL-CCNC: 46 U/L (ref 39–117)
ALT SERPL-CCNC: 29 U/L (ref 1–41)
AST SERPL-CCNC: 33 U/L (ref 1–40)
BILIRUB SERPL-MCNC: 0.7 MG/DL (ref 0.2–1.2)
BUN SERPL-MCNC: 18 MG/DL (ref 8–23)
BUN/CREAT SERPL: 15.3 (ref 7–25)
CALCIUM SERPL-MCNC: 9.5 MG/DL (ref 8.6–10.5)
CHLORIDE SERPL-SCNC: 101 MMOL/L (ref 98–107)
CO2 SERPL-SCNC: 26.2 MMOL/L (ref 22–29)
CREAT SERPL-MCNC: 1.18 MG/DL (ref 0.76–1.27)
GLOBULIN SER CALC-MCNC: 3.1 GM/DL
GLUCOSE SERPL-MCNC: 100 MG/DL (ref 65–99)
POTASSIUM SERPL-SCNC: 4.8 MMOL/L (ref 3.5–5.2)
PROT SERPL-MCNC: 7.4 G/DL (ref 6–8.5)
SODIUM SERPL-SCNC: 141 MMOL/L (ref 136–145)

## 2019-09-16 RX ORDER — ATENOLOL 50 MG/1
TABLET ORAL
Qty: 90 TABLET | Refills: 2 | Status: SHIPPED | OUTPATIENT
Start: 2019-09-16 | End: 2020-06-12

## 2019-09-16 RX ORDER — CHLORTHALIDONE 25 MG/1
TABLET ORAL
Qty: 90 TABLET | Refills: 2 | Status: SHIPPED | OUTPATIENT
Start: 2019-09-16 | End: 2020-05-04

## 2019-10-14 RX ORDER — MELOXICAM 15 MG/1
TABLET ORAL
Qty: 90 TABLET | Refills: 1 | Status: SHIPPED | OUTPATIENT
Start: 2019-10-14 | End: 2020-04-09 | Stop reason: SDUPTHER

## 2019-10-21 ENCOUNTER — CLINICAL SUPPORT NO REQUIREMENTS (OUTPATIENT)
Dept: CARDIOLOGY | Facility: CLINIC | Age: 79
End: 2019-10-21

## 2019-10-21 DIAGNOSIS — I49.5 SICK SINUS SYNDROME (HCC): Primary | ICD-10-CM

## 2019-10-21 PROCEDURE — 93280 PM DEVICE PROGR EVAL DUAL: CPT | Performed by: INTERNAL MEDICINE

## 2019-10-28 ENCOUNTER — TELEPHONE (OUTPATIENT)
Dept: FAMILY MEDICINE CLINIC | Facility: CLINIC | Age: 79
End: 2019-10-28

## 2019-10-28 NOTE — TELEPHONE ENCOUNTER
Patient would like you to call him regarding his handicap parking permit. His phone number is [phone]. Thank you.

## 2019-12-19 ENCOUNTER — CLINICAL SUPPORT (OUTPATIENT)
Dept: FAMILY MEDICINE CLINIC | Facility: CLINIC | Age: 79
End: 2019-12-19

## 2019-12-19 DIAGNOSIS — Z23 NEED FOR IMMUNIZATION AGAINST INFLUENZA: Primary | ICD-10-CM

## 2019-12-19 PROCEDURE — 90653 IIV ADJUVANT VACCINE IM: CPT | Performed by: FAMILY MEDICINE

## 2019-12-19 PROCEDURE — G0008 ADMIN INFLUENZA VIRUS VAC: HCPCS | Performed by: FAMILY MEDICINE

## 2019-12-23 RX ORDER — ROSUVASTATIN CALCIUM 5 MG/1
TABLET, COATED ORAL
Qty: 90 TABLET | Refills: 4 | Status: SHIPPED | OUTPATIENT
Start: 2019-12-23 | End: 2021-03-17

## 2020-01-23 ENCOUNTER — CLINICAL SUPPORT NO REQUIREMENTS (OUTPATIENT)
Dept: CARDIOLOGY | Facility: CLINIC | Age: 80
End: 2020-01-23

## 2020-01-23 DIAGNOSIS — E78.00 PURE HYPERCHOLESTEROLEMIA: Primary | ICD-10-CM

## 2020-01-23 DIAGNOSIS — I49.5 SICK SINUS SYNDROME (HCC): Primary | ICD-10-CM

## 2020-01-23 DIAGNOSIS — I10 ESSENTIAL HYPERTENSION: ICD-10-CM

## 2020-01-23 PROCEDURE — 93294 REM INTERROG EVL PM/LDLS PM: CPT | Performed by: INTERNAL MEDICINE

## 2020-01-23 PROCEDURE — 93296 REM INTERROG EVL PM/IDS: CPT | Performed by: INTERNAL MEDICINE

## 2020-01-28 LAB
ALBUMIN SERPL-MCNC: 4.6 G/DL (ref 3.5–5.2)
ALBUMIN/GLOB SERPL: 1.9 G/DL
ALP SERPL-CCNC: 51 U/L (ref 39–117)
ALT SERPL-CCNC: 26 U/L (ref 1–41)
AST SERPL-CCNC: 23 U/L (ref 1–40)
BILIRUB SERPL-MCNC: 0.6 MG/DL (ref 0.2–1.2)
BUN SERPL-MCNC: 14 MG/DL (ref 8–23)
BUN/CREAT SERPL: 10.3 (ref 7–25)
CALCIUM SERPL-MCNC: 9.2 MG/DL (ref 8.6–10.5)
CHLORIDE SERPL-SCNC: 98 MMOL/L (ref 98–107)
CHOLEST SERPL-MCNC: 119 MG/DL (ref 0–200)
CO2 SERPL-SCNC: 26.1 MMOL/L (ref 22–29)
CREAT SERPL-MCNC: 1.36 MG/DL (ref 0.76–1.27)
GLOBULIN SER CALC-MCNC: 2.4 GM/DL
GLUCOSE SERPL-MCNC: 105 MG/DL (ref 65–99)
HDLC SERPL-MCNC: 36 MG/DL (ref 40–60)
LDLC SERPL CALC-MCNC: 58 MG/DL (ref 0–100)
POTASSIUM SERPL-SCNC: 3.9 MMOL/L (ref 3.5–5.2)
PROT SERPL-MCNC: 7 G/DL (ref 6–8.5)
SODIUM SERPL-SCNC: 140 MMOL/L (ref 136–145)
TRIGL SERPL-MCNC: 127 MG/DL (ref 0–150)
VLDLC SERPL CALC-MCNC: 25.4 MG/DL

## 2020-01-28 NOTE — PROGRESS NOTES
Lab work overall looks good.  The cholesterol is stable.  Your kidney function is minimally diminished, possibly related to fasting.  We will discuss more next visit.

## 2020-02-04 ENCOUNTER — OFFICE VISIT (OUTPATIENT)
Dept: FAMILY MEDICINE CLINIC | Facility: CLINIC | Age: 80
End: 2020-02-04

## 2020-02-04 VITALS
OXYGEN SATURATION: 96 % | TEMPERATURE: 97.1 F | BODY MASS INDEX: 34.37 KG/M2 | HEIGHT: 67 IN | SYSTOLIC BLOOD PRESSURE: 126 MMHG | DIASTOLIC BLOOD PRESSURE: 73 MMHG | HEART RATE: 72 BPM | WEIGHT: 219 LBS

## 2020-02-04 DIAGNOSIS — D33.7: ICD-10-CM

## 2020-02-04 DIAGNOSIS — F32.0 CURRENT MILD EPISODE OF MAJOR DEPRESSIVE DISORDER WITHOUT PRIOR EPISODE (HCC): ICD-10-CM

## 2020-02-04 DIAGNOSIS — I10 ESSENTIAL HYPERTENSION: ICD-10-CM

## 2020-02-04 DIAGNOSIS — Z00.00 MEDICARE ANNUAL WELLNESS VISIT, SUBSEQUENT: Primary | ICD-10-CM

## 2020-02-04 DIAGNOSIS — E78.00 PURE HYPERCHOLESTEROLEMIA: ICD-10-CM

## 2020-02-04 DIAGNOSIS — I49.5 SICK SINUS SYNDROME (HCC): ICD-10-CM

## 2020-02-04 PROCEDURE — G0009 ADMIN PNEUMOCOCCAL VACCINE: HCPCS | Performed by: FAMILY MEDICINE

## 2020-02-04 PROCEDURE — 99214 OFFICE O/P EST MOD 30 MIN: CPT | Performed by: FAMILY MEDICINE

## 2020-02-04 PROCEDURE — G0439 PPPS, SUBSEQ VISIT: HCPCS | Performed by: FAMILY MEDICINE

## 2020-02-04 PROCEDURE — 90732 PPSV23 VACC 2 YRS+ SUBQ/IM: CPT | Performed by: FAMILY MEDICINE

## 2020-02-04 NOTE — PROGRESS NOTES
The ABCs of the Annual Wellness Visit  Subsequent Medicare Wellness Visit    Chief Complaint   Patient presents with   • Medicare Wellness-subsequent     follow up labs        Subjective   History of Present Illness:  Mikey Crawley is a 79 y.o. male who presents for a Subsequent Medicare Wellness Visit.    HEALTH RISK ASSESSMENT    Recent Hospitalizations:  No hospitalization(s) within the last year.    Current Medical Providers:  Patient Care Team:  Sotero Torres MD as PCP - General (Family Medicine)  Dima Alvarado MD as Consulting Physician (Orthopedic Surgery)  Jacinto Maldonado MD as Consulting Physician (Orthopedic Surgery)    Smoking Status:  Social History     Tobacco Use   Smoking Status Former Smoker   • Packs/day: 0.00   • Years: 1.00   • Pack years: 0.00   • Types: Cigars   • Start date: 1980   • Last attempt to quit: 1985   • Years since quittin.7   Smokeless Tobacco Never Used   Tobacco Comment    never smoked cigarets only cigars now and then       Alcohol Consumption:  Social History     Substance and Sexual Activity   Alcohol Use Yes   • Alcohol/week: 2.0 standard drinks   • Types: 1 Cans of beer, 1 Shots of liquor per week    Comment: seldom       Depression Screen:   PHQ-2/PHQ-9 Depression Screening 2020   Little interest or pleasure in doing things 1   Feeling down, depressed, or hopeless 3   Trouble falling or staying asleep, or sleeping too much 0   Feeling tired or having little energy 3   Poor appetite or overeating 0   Feeling bad about yourself - or that you are a failure or have let yourself or your family down 1   Trouble concentrating on things, such as reading the newspaper or watching television 0   Moving or speaking so slowly that other people could have noticed. Or the opposite - being so fidgety or restless that you have been moving around a lot more than usual 0   Thoughts that you would be better off dead, or of hurting yourself in some way 0   Total Score  8   If you checked off any problems, how difficult have these problems made it for you to do your work, take care of things at home, or get along with other people? Somewhat difficult       Fall Risk Screen:  LORNA Fall Risk Assessment was completed, and patient is at LOW risk for falls.Assessment completed on:2/4/2020    Health Habits and Functional and Cognitive Screening:  Functional & Cognitive Status 2/4/2020   Do you have difficulty preparing food and eating? No   Do you have difficulty bathing yourself, getting dressed or grooming yourself? No   Do you have difficulty using the toilet? No   Do you have difficulty moving around from place to place? No   Do you have trouble with steps or getting out of a bed or a chair? No   Current Diet Well Balanced Diet   Dental Exam Up to date   Eye Exam Up to date   Exercise (times per week) 0 times per week   Current Exercise Activities Include None   Do you need help using the phone?  No   Are you deaf or do you have serious difficulty hearing?  Yes   Do you need help with transportation? No   Do you need help shopping? No   Do you need help preparing meals?  No   Do you need help with housework?  No   Do you need help with laundry? No   Do you need help taking your medications? No   Do you need help managing money? No   Do you ever drive or ride in a car without wearing a seat belt? No   Have you felt unusual stress, anger or loneliness in the last month? Yes   Who do you live with? Spouse   If you need help, do you have trouble finding someone available to you? No   Have you been bothered in the last four weeks by sexual problems? No   Do you have difficulty concentrating, remembering or making decisions? No         Does the patient have evidence of cognitive impairment? No    Asprin use counseling:Does not need ASA (and currently is not on it)    Age-appropriate Screening Schedule:  Refer to the list below for future screening recommendations based on patient's age,  sex and/or medical conditions. Orders for these recommended tests are listed in the plan section. The patient has been provided with a written plan.    Health Maintenance   Topic Date Due   • ZOSTER VACCINE (2 of 3) 01/28/2014   • COLONOSCOPY  06/12/2020   • LIPID PANEL  01/27/2021   • TDAP/TD VACCINES (2 - Td) 09/28/2028   • INFLUENZA VACCINE  Completed          The following portions of the patient's history were reviewed and updated as appropriate: allergies, current medications, past family history, past medical history, past social history, past surgical history and problem list.    Outpatient Medications Prior to Visit   Medication Sig Dispense Refill   • amlodipine-olmesartan (AMAYA) 5-40 MG per tablet TAKE 1 TABLET DAILY 90 tablet 3   • atenolol (TENORMIN) 50 MG tablet TAKE 1 TABLET DAILY (REPLACES TENORETIC) 90 tablet 2   • chlorthalidone (HYGROTON) 25 MG tablet TAKE 1 TABLET DAILY (REPLACING TENORETIC) 90 tablet 2   • finasteride (PROSCAR) 5 MG tablet Take 1 tablet by mouth daily.     • meloxicam (MOBIC) 15 MG tablet TAKE 1 TABLET DAILY 90 tablet 1   • rosuvastatin (CRESTOR) 5 MG tablet TAKE 1 TABLET THREE TIMES A WEEK (TIME FOR FOLLOW UP VISIT) 90 tablet 4   • tamsulosin (FLOMAX) 0.4 MG capsule Take 1 capsule by mouth 2 (two) times a day.       No facility-administered medications prior to visit.        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 Patient has an advance directive, copy requested.    Review of Systems    Compared to one year ago, the patient feels his  "physical health is the same.  Compared to one year ago, the patient feels his mental health is the same.    Reviewed chart for potential of high risk medication in the elderly: yes  Reviewed chart for potential of harmful drug interactions in the elderly:yes    Objective         Vitals:    02/04/20 0942   BP: 126/73   Pulse: 72   Temp: 97.1 °F (36.2 °C)   TempSrc: Oral   SpO2: 96%   Weight: 99.3 kg (219 lb)   Height: 170.2 cm (67.01\")       Body mass index is 34.29 kg/m².  Discussed the patient's BMI with him. The BMI Is elevated.  Exercise indicated..    Physical Exam    Lab Results   Component Value Date     (H) 01/27/2020    CHLPL 119 01/27/2020    TRIG 127 01/27/2020    HDL 36 (L) 01/27/2020    LDL 58 01/27/2020    VLDL 25.4 01/27/2020        Assessment/Plan   Medicare Risks and Personalized Health Plan  CMS Preventative Services Quick Reference  Immunizations Discussed/Encouraged (specific immunizations; Pneumococcal 23 and Shingrix )    The above risks/problems have been discussed with the patient.  Pertinent information has been shared with the patient in the After Visit Summary.  Follow up plans and orders are seen below in the Assessment/Plan Section.    Diagnoses and all orders for this visit:    1. Medicare annual wellness visit, subsequent (Primary)    2. Essential hypertension    3. Pure hypercholesterolemia    4. 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 Sick sinus syndrome (CMS/HCC)      Follow Up:  No follow-ups on file.     An After Visit Summary and PPPS were given to the patient.             "

## 2020-04-09 RX ORDER — MELOXICAM 15 MG/1
TABLET ORAL
Qty: 90 TABLET | Refills: 3 | Status: SHIPPED | OUTPATIENT
Start: 2020-04-09 | End: 2021-04-05 | Stop reason: SDUPTHER

## 2020-04-21 ENCOUNTER — TELEMEDICINE (OUTPATIENT)
Dept: CARDIOLOGY | Facility: CLINIC | Age: 80
End: 2020-04-21

## 2020-04-21 VITALS
WEIGHT: 207.5 LBS | DIASTOLIC BLOOD PRESSURE: 68 MMHG | SYSTOLIC BLOOD PRESSURE: 129 MMHG | BODY MASS INDEX: 32.57 KG/M2 | HEIGHT: 67 IN | HEART RATE: 69 BPM

## 2020-04-21 DIAGNOSIS — I10 ESSENTIAL HYPERTENSION: ICD-10-CM

## 2020-04-21 DIAGNOSIS — R42 DIZZY SPELLS: Primary | ICD-10-CM

## 2020-04-21 DIAGNOSIS — I49.5 SSS (SICK SINUS SYNDROME) (HCC): ICD-10-CM

## 2020-04-21 PROCEDURE — 99212 OFFICE O/P EST SF 10 MIN: CPT | Performed by: INTERNAL MEDICINE

## 2020-04-21 NOTE — PROGRESS NOTES
"PATIENTINFORMATION    Date of Office Visit: 20  Encounter Provider: Michelle Garcia MD  Place of Service: [facility] CARDIOLOGY  Patient Name: Mikey Crawley  : 1940    Subjective:         Patient ID: Mikey Crawley is a 79 y.o. male.      History of Present Illness    This is a patient previously followed by Dr. Rodriguez.  He has a history of sick sinus syndrome and is status post pacemaker.  This was last checked in 2020 with functioning normally at that time.    He is active and is doing a lot of yard work without symptoms except he says when he bends over at the waist he feels lightheaded and dizzy when he is bent over and when he stands up.  This is been going on for a while.     Objective:     /68 (BP Location: Left arm)   Pulse 69   Ht 168.9 cm (66.5\")   Wt 94.1 kg (207 lb 8 oz)   BMI 32.99 kg/m²  Body mass index is 32.99 kg/m².       Lab Review: reviewed labs from 2020      Assessment/Plan:       1.  Sick sinus syndrome status post dual-chamber pacemaker.  Continue routine pacemaker check.  2.  Hypertension.  Well controlled.  3.  Chronic kidney disease.  4.  Dizzy spells.  I am going to have him cut the chlorthalidone in half and see if that helps at all with his dizzy spells.    He will follow-up with Cynthia in 1 year.    You have chosen to receive care through a video telemedicine visit today. Do you consent to use a video telemedicine visit for your medical care today? Yes.    Video telemedicine visit lasted 10 minutes.    No orders of the defined types were placed in this encounter.       Discharge Medications           Accurate as of 2020  1:08 PM. If you have any questions, ask your nurse or doctor.               Changes to Medications      Instructions Start Date   meloxicam 15 MG tablet  Commonly known as:  MOBIC  What changed:    · how much to take  · how to take this  · when to take this  · additional instructions   TAKE " 1 TABLET DAILY         Continue These Medications      Instructions Start Date   amlodipine-olmesartan 5-40 MG per tablet  Commonly known as:  AMAYA   TAKE 1 TABLET DAILY      atenolol 50 MG tablet  Commonly known as:  TENORMIN   TAKE 1 TABLET DAILY (REPLACES TENORETIC)      chlorthalidone 25 MG tablet  Commonly known as:  HYGROTON   TAKE 1 TABLET DAILY (REPLACING TENORETIC)      finasteride 5 MG tablet  Commonly known as:  PROSCAR   1 tablet, Oral, Daily      Flomax 0.4 MG capsule 24 hr capsule  Generic drug:  tamsulosin   1 capsule, Oral, 2 Times Daily      rosuvastatin 5 MG tablet  Commonly known as:  CRESTOR   TAKE 1 TABLET THREE TIMES A WEEK (TIME FOR FOLLOW UP VISIT)      sertraline 50 MG tablet  Commonly known as:  Zoloft   50 mg, Oral, Daily                    Michelle Garcia MD  04/21/20  13:08

## 2020-04-27 ENCOUNTER — CLINICAL SUPPORT NO REQUIREMENTS (OUTPATIENT)
Dept: CARDIOLOGY | Facility: CLINIC | Age: 80
End: 2020-04-27

## 2020-04-27 DIAGNOSIS — I49.5 SICK SINUS SYNDROME (HCC): Primary | ICD-10-CM

## 2020-04-27 PROCEDURE — 93294 REM INTERROG EVL PM/LDLS PM: CPT | Performed by: INTERNAL MEDICINE

## 2020-04-27 PROCEDURE — 93296 REM INTERROG EVL PM/IDS: CPT | Performed by: INTERNAL MEDICINE

## 2020-05-04 ENCOUNTER — TELEMEDICINE (OUTPATIENT)
Dept: FAMILY MEDICINE CLINIC | Facility: CLINIC | Age: 80
End: 2020-05-04

## 2020-05-04 VITALS
WEIGHT: 208 LBS | SYSTOLIC BLOOD PRESSURE: 122 MMHG | HEART RATE: 69 BPM | BODY MASS INDEX: 33.07 KG/M2 | DIASTOLIC BLOOD PRESSURE: 64 MMHG

## 2020-05-04 DIAGNOSIS — I10 ESSENTIAL HYPERTENSION: Primary | ICD-10-CM

## 2020-05-04 PROCEDURE — 99213 OFFICE O/P EST LOW 20 MIN: CPT | Performed by: FAMILY MEDICINE

## 2020-05-04 RX ORDER — HYDROCHLOROTHIAZIDE 12.5 MG/1
12.5 TABLET ORAL DAILY
Qty: 90 TABLET | Refills: 1 | Status: SHIPPED | OUTPATIENT
Start: 2020-05-04 | End: 2020-05-04

## 2020-05-04 RX ORDER — HYDROCHLOROTHIAZIDE 12.5 MG/1
12.5 TABLET ORAL DAILY
Qty: 90 TABLET | Refills: 1 | Status: SHIPPED | OUTPATIENT
Start: 2020-05-04 | End: 2020-10-19 | Stop reason: SDUPTHER

## 2020-05-04 NOTE — PROGRESS NOTES
Subjective   Mikey Crawley is a 79 y.o. male.     Chief Complaint   Patient presents with   • Hypertension        History of Present Illness    Coronavirus pandemic telehealth visit.  Good audio and video connection.  Patient gave informed consent through the [employer] check in process.    Follow-up hypertension.  He continues on amlodipine olmesartan 5-40, and chlorthalidone 25 mg a day.  He was having some lightheadedness every time he bent over and stood back up.  His blood pressures been otherwise normal 120/70.  He has had no feelings of tachycardia.  Lightheadedness is only occurring when he bends over and sits up.  He had a pacemaker check a few days ago.  It showed a 22 beat run of ventricular tachycardia at a single episode.  His cardiology office called him.  The patient did not know what happened.  He feels fine.  The symptoms only occur when he is bending over and then standing up fast.  They had cut back his chlorthalidone from 25 mg a day down to 1/2 tablet a day.  However symptoms still persist.  No urinary difficulties.    The following portions of the patient's history were reviewed and updated as appropriate: allergies, current medications, past family history, past medical history, past social history, past surgical history and problem list.          Review of Systems   Constitutional: Negative.    Cardiovascular: Negative.  Negative for palpitations.   Neurological: Positive for light-headedness.   Psychiatric/Behavioral: Negative.        Objective   Blood pressure 122/64, pulse 69, weight 94.3 kg (208 lb).  Physical Exam   Constitutional: He is oriented to person, place, and time. No distress.   HENT:   Head: Atraumatic.   Pulmonary/Chest: Effort normal. No respiratory distress.   He is able to speak in complete sentences without shortness of breath.   Neurological: He is alert and oriented to person, place, and time. Coordination normal.   Skin: No pallor.   Psychiatric: He has a normal mood  and affect. His behavior is normal.       Assessment/Plan   Mikey was seen today for hypertension.    Diagnoses and all orders for this visit:    Essential hypertension    Other orders  -     Discontinue: hydroCHLOROthiazide (HYDRODIURIL) 12.5 MG tablet; Take 1 tablet by mouth Daily.  -     hydroCHLOROthiazide (HYDRODIURIL) 12.5 MG tablet; Take 1 tablet by mouth Daily.      Hypertension.  Probable element of orthostasis.  The chlorthalidone is a more potent diuretic.  I am discontinuing it.  Switching to hydrochlorothiazide 12.5 mg a day.  He will continue to monitor his blood pressure.  I will see him back in 3 months.  He states he will let us know if the dizziness persists.  He did have a run of ventricular tachycardia that was reportedly asymptomatic.  This is likely not the same issue.  He is being followed by cardiology for this.  According to their notes were going to repeat the pacemaker check in the next few months.    Length of service approximately 15 minutes

## 2020-05-04 NOTE — PATIENT INSTRUCTIONS
Keep monitoring your blood pressure readings on the new medication hydrochlorothiazide 12.5 mg a day.  Stop the chlorthalidone.  Continue other medicines as is.  See me in 3 months otherwise.  If the dizziness persists when you stand up fast, please let us know.

## 2020-06-12 RX ORDER — ATENOLOL 50 MG/1
TABLET ORAL
Qty: 90 TABLET | Refills: 3 | Status: SHIPPED | OUTPATIENT
Start: 2020-06-12 | End: 2021-06-07

## 2020-06-16 RX ORDER — AMLODIPINE AND OLMESARTAN MEDOXOMIL 5; 40 MG/1; MG/1
1 TABLET ORAL DAILY
Qty: 90 TABLET | Refills: 3 | Status: SHIPPED | OUTPATIENT
Start: 2020-06-16 | End: 2021-06-14

## 2020-10-20 RX ORDER — HYDROCHLOROTHIAZIDE 12.5 MG/1
12.5 TABLET ORAL DAILY
Qty: 90 TABLET | Refills: 0 | Status: SHIPPED | OUTPATIENT
Start: 2020-10-20 | End: 2021-01-06

## 2020-10-22 ENCOUNTER — CLINICAL SUPPORT NO REQUIREMENTS (OUTPATIENT)
Dept: CARDIOLOGY | Facility: CLINIC | Age: 80
End: 2020-10-22

## 2020-10-22 DIAGNOSIS — I49.5 SICK SINUS SYNDROME (HCC): Primary | ICD-10-CM

## 2020-10-22 PROCEDURE — 93280 PM DEVICE PROGR EVAL DUAL: CPT | Performed by: INTERNAL MEDICINE

## 2020-10-29 ENCOUNTER — FLU SHOT (OUTPATIENT)
Dept: FAMILY MEDICINE CLINIC | Facility: CLINIC | Age: 80
End: 2020-10-29

## 2020-10-29 DIAGNOSIS — Z23 NEED FOR VACCINATION: Primary | ICD-10-CM

## 2020-10-29 PROCEDURE — 90694 VACC AIIV4 NO PRSRV 0.5ML IM: CPT | Performed by: FAMILY MEDICINE

## 2020-10-29 PROCEDURE — G0008 ADMIN INFLUENZA VIRUS VAC: HCPCS | Performed by: FAMILY MEDICINE

## 2020-12-30 ENCOUNTER — OFFICE VISIT (OUTPATIENT)
Dept: FAMILY MEDICINE CLINIC | Facility: CLINIC | Age: 80
End: 2020-12-30

## 2020-12-30 VITALS
HEIGHT: 66 IN | DIASTOLIC BLOOD PRESSURE: 77 MMHG | WEIGHT: 206.9 LBS | BODY MASS INDEX: 33.25 KG/M2 | SYSTOLIC BLOOD PRESSURE: 127 MMHG | HEART RATE: 61 BPM | OXYGEN SATURATION: 97 % | TEMPERATURE: 97.3 F

## 2020-12-30 DIAGNOSIS — L02.91 ABSCESS: Primary | ICD-10-CM

## 2020-12-30 PROCEDURE — 10060 I&D ABSCESS SIMPLE/SINGLE: CPT | Performed by: FAMILY MEDICINE

## 2020-12-30 PROCEDURE — 99213 OFFICE O/P EST LOW 20 MIN: CPT | Performed by: FAMILY MEDICINE

## 2020-12-30 RX ORDER — METRONIDAZOLE 500 MG/1
500 TABLET ORAL 3 TIMES DAILY
Qty: 15 TABLET | Refills: 0 | Status: ON HOLD | OUTPATIENT
Start: 2020-12-30 | End: 2022-06-10

## 2020-12-30 NOTE — PROGRESS NOTES
"Subjective   Mikey Crawley is a 80 y.o. male.     Chief Complaint   Patient presents with   • swelling in groin area     x monday        History of Present Illness    3 days of left inner thigh groin swelling.  Slightly tender.  No drainage.  He maybe feels a little bit feverish.  Has had no dysuria.  He is not had these before.  He otherwise feels fine.  No history of diabetes.  Mild to moderate pain.  No injury.      The following portions of the patient's history were reviewed and updated as appropriate: allergies, current medications, past family history, past medical history, past social history, past surgical history and problem list.          Review of Systems   Constitutional: Negative for fever.   Genitourinary: Negative.  Negative for scrotal swelling.   Skin: Positive for wound.       Objective   Blood pressure 127/77, pulse 61, temperature 97.3 °F (36.3 °C), temperature source Temporal, height 168.9 cm (66.5\"), weight 93.8 kg (206 lb 14.4 oz), SpO2 97 %.  Physical Exam  Constitutional:       Appearance: Normal appearance.   Neck:      Musculoskeletal: Normal range of motion.   Cardiovascular:      Rate and Rhythm: Normal rate.      Pulses: Normal pulses.   Genitourinary:     Comments: Penis and scrotal exam unremarkable.  The left inner thigh there is a 2 x 3 cm erythematous abscess coming to ahead.  Minimal drainage.  Mild to moderate surrounding erythema.  No extensive cellulitis.  No lymphangitis.  Neurological:      Mental Status: He is alert.         I&D procedure.  Clean conditions.  Alcohol swab.  1 cc 1% lidocaine injected with 25-gauge needle.  Good local anesthesia.  Further clean technique, 1 cm incision of the abscess.  With scalpel.  Drained 2 or 3 cc of anaerobic smelling pus.  The wound was irrigated with sterile saline.  It was not packed.  There is minimal bleeding.  The wound was dressed in the usual manner.  Patient tolerated the procedure well.    Assessment/Plan   Diagnoses and " all orders for this visit:    1. Abscess (Primary)      Inner thigh abscess.  Purulent smelling pus.  Status post incision and drainage.  Likely curative.  Suspicious for anaerobic infection.  Recommending metronidazole 500 mg 3 times a day for 5 days because of some questionable cellulitis.  Going to call with report tomorrow let us know how he is doing.  This time there is no need for pain medication.  With severe pain, high fever, or worsening appearing wound he is going to go to the immediately to the emergency room otherwise call us with concerns.

## 2021-01-06 RX ORDER — HYDROCHLOROTHIAZIDE 12.5 MG/1
TABLET ORAL
Qty: 90 TABLET | Refills: 1 | Status: SHIPPED | OUTPATIENT
Start: 2021-01-06 | End: 2021-06-29

## 2021-03-09 DIAGNOSIS — Z23 IMMUNIZATION DUE: ICD-10-CM

## 2021-03-17 RX ORDER — ROSUVASTATIN CALCIUM 5 MG/1
TABLET, COATED ORAL
Qty: 90 TABLET | Refills: 1 | Status: SHIPPED | OUTPATIENT
Start: 2021-03-17 | End: 2022-03-21

## 2021-04-05 RX ORDER — MELOXICAM 15 MG/1
TABLET ORAL
Qty: 90 TABLET | Refills: 0 | Status: SHIPPED | OUTPATIENT
Start: 2021-04-05 | End: 2021-04-22 | Stop reason: ALTCHOICE

## 2021-04-22 ENCOUNTER — OFFICE VISIT (OUTPATIENT)
Dept: CARDIOLOGY | Facility: CLINIC | Age: 81
End: 2021-04-22

## 2021-04-22 ENCOUNTER — CLINICAL SUPPORT NO REQUIREMENTS (OUTPATIENT)
Dept: CARDIOLOGY | Facility: CLINIC | Age: 81
End: 2021-04-22

## 2021-04-22 VITALS
WEIGHT: 206 LBS | HEIGHT: 67 IN | HEART RATE: 64 BPM | SYSTOLIC BLOOD PRESSURE: 144 MMHG | BODY MASS INDEX: 32.33 KG/M2 | DIASTOLIC BLOOD PRESSURE: 70 MMHG

## 2021-04-22 DIAGNOSIS — I49.5 SICK SINUS SYNDROME (HCC): Primary | ICD-10-CM

## 2021-04-22 DIAGNOSIS — I10 ESSENTIAL HYPERTENSION: ICD-10-CM

## 2021-04-22 PROCEDURE — 93280 PM DEVICE PROGR EVAL DUAL: CPT | Performed by: INTERNAL MEDICINE

## 2021-04-22 PROCEDURE — 99214 OFFICE O/P EST MOD 30 MIN: CPT | Performed by: NURSE PRACTITIONER

## 2021-04-22 PROCEDURE — 93000 ELECTROCARDIOGRAM COMPLETE: CPT | Performed by: NURSE PRACTITIONER

## 2021-04-22 NOTE — PROGRESS NOTES
Date of Office Visit: 2021  Encounter Provider: Adelina Alvarez, MELYSSA, APRN  Place of Service: [facility]  Patient Name: Mikey Crawley  :1940        Subjective:     Chief Complaint:  Sick sinus syndrome, permanent pacemaker, hypertension, lightheadedness with position changes      History of Present Illness:  Mikey Crawley is a 80 y.o. male patient of Dr. Garcia.  This patient is new to me and I have reviewed his records.    Patient has a history of sick sinus syndrome status post permanent pacemaker placement, hypertension, chronic kidney disease, dizziness.    Patient has a history of sick sinus syndrome with pacemaker placement.  He has continued to be followed routinely with the office.  He transferred care to Dr. Garcia 2020 at which point he had a telehealth visit with her due to the coronavirus epidemic.  At this point he reported some lightheadedness with bending over and standing up quickly.  This has been going on for a while.  His chlorthalidone dose was decreased in half.      Patient presents to office today for follow-up appointment.  Patient reports he is doing well overall since last visit.  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  He gets some occasional shortness of breath with heavier exertion, usually just with carrying a lot of heavy boxes up and down the stairs and this is not new or worsening.  Does not get shortness of breath with regular activities.  Denies any chest pain or discomfort, palpitations, racing heartbeat sensation, syncope, near syncope, falls, or abnormal bleeding.  Using CPAP nightly.  He does continue to get some occasional lightheadedness with rapid position changes.  He reports that he really does not drink any water during the day, just enough to swallow his pills.  He is not orthostatic in the office today.  Blood pressure at home runs about 140  systolic.        Past Medical History:   Diagnosis Date   • Bradycardia    • Diverticulosis    • Hyperlipidemia    • Hypertension    • Lesion of vertebra    • Obstructive sleep apnea     cpap   • Osteoarthritis    • Polyp of sigmoid colon    • Sick sinus syndrome (CMS/HCC)    • Sinus bradycardia      Past Surgical History:   Procedure Laterality Date   • BACK SURGERY     • CATARACT EXTRACTION, BILATERAL      sep 2018   • CHOLECYSTECTOMY     • COLONOSCOPY     • EYE SURGERY  Sept 2018   • HEMORRHOIDECTOMY     • KNEE SURGERY     • PACEMAKER IMPLANTATION      dual chamber   • SHOULDER SURGERY      right 4-18   • SPINE SURGERY     • TONSILLECTOMY AND ADENOIDECTOMY     • VASECTOMY       Outpatient Medications Prior to Visit   Medication Sig Dispense Refill   • amlodipine-olmesartan (AMAYA) 5-40 MG per tablet Take 1 tablet by mouth Daily. 90 tablet 3   • atenolol (TENORMIN) 50 MG tablet TAKE 1 TABLET DAILY 90 tablet 3   • finasteride (PROSCAR) 5 MG tablet Take 1 tablet by mouth daily.     • hydroCHLOROthiazide (HYDRODIURIL) 12.5 MG tablet TAKE 1 TABLET DAILY 90 tablet 1   • metroNIDAZOLE (Flagyl) 500 MG tablet Take 1 tablet by mouth 3 (Three) Times a Day. 15 tablet 0   • rosuvastatin (CRESTOR) 5 MG tablet TAKE 1 TABLET THREE TIMES A WEEK (TIME FOR FOLLOW UP VISIT) 90 tablet 1   • sertraline (ZOLOFT) 50 MG tablet AT THE START OF THERAPY TAKE ONE-HALF (1/2) TABLET DAILY FOR THE FIRST 10 DAYS, THEN 1 TABLET DAILY THEREAFTER 90 tablet 1   • tamsulosin (FLOMAX) 0.4 MG capsule Take 1 capsule by mouth 2 (two) times a day.     • meloxicam (MOBIC) 15 MG tablet Pt takes x3 weekly 90 tablet 0     No facility-administered medications prior to visit.       Allergies as of 04/22/2021   • (No Known Allergies)     Social History     Socioeconomic History   • Marital status:      Spouse name: Not on file   • Number of children: 3   • Years of education: 12   • Highest education level: Not on file   Tobacco Use   • Smoking status:  "Former Smoker     Packs/day: 0.00     Years: 1.00     Pack years: 0.00     Types: Cigars     Start date: 1980     Quit date: 1985     Years since quittin.9   • Smokeless tobacco: Never Used   • Tobacco comment: never smoked cigarettes only cigars now and then   Substance and Sexual Activity   • Alcohol use: Yes     Alcohol/week: 2.0 standard drinks     Types: 1 Cans of beer, 1 Shots of liquor per week     Comment: seldom   • Drug use: No   • Sexual activity: Yes     Partners: Female     Birth control/protection: None     Family History   Problem Relation Age of Onset   • Cancer Father    • Arthritis Father    • Vision loss Father    • Emphysema Mother    • Glaucoma Mother    • Arthritis Mother    • No Known Problems Maternal Grandmother    • No Known Problems Maternal Grandfather    • No Known Problems Paternal Grandmother    • No Known Problems Paternal Grandfather        Review of Systems   Constitutional: Negative for chills, fever, malaise/fatigue, weight gain and weight loss.   Cardiovascular:        SEE HPI   Respiratory: Negative for cough, snoring and wheezing.    Hematologic/Lymphatic: Negative for bleeding problem. Does not bruise/bleed easily.   Musculoskeletal: Positive for joint pain (\"arthritis\"). Negative for falls and myalgias.   Gastrointestinal: Negative for melena, nausea and vomiting.   Genitourinary: Negative for hematuria.   Psychiatric/Behavioral: Positive for depression. Negative for altered mental status and substance abuse. The patient is not nervous/anxious.           Objective:     Vitals:    21 0919   BP: 144/70   BP Location: Left arm   Patient Position: Sitting   Pulse: 64   Weight: 93.4 kg (206 lb)   Height: 168.9 cm (66.5\")     Body mass index is 32.75 kg/m².      PHYSICAL EXAM:  Constitutional:       General: Not in acute distress.     Appearance: Well-developed. Not diaphoretic.   Eyes:      Pupils: Pupils are equal, round, and reactive to light.   HENT:      " Head: Normocephalic and atraumatic.   Neck:      Vascular: No carotid bruit or JVD.   Pulmonary:      Effort: Pulmonary effort is normal. No respiratory distress.      Breath sounds: Normal breath sounds. No wheezing. No rales.   Cardiovascular:      Normal rate. Regular rhythm.      Murmurs: There is no murmur.      No gallop. No click. No rub.   Edema:     Peripheral edema absent.   Abdominal:      General: Bowel sounds are normal. There is no distension.      Palpations: Abdomen is soft.   Musculoskeletal: Normal range of motion.         General: No tenderness or deformity.      Cervical back: Neck supple. Skin:     General: Skin is warm and dry.      Findings: No erythema or rash.   Neurological:      Mental Status: Alert and oriented to person, place, and time.   Psychiatric:         Behavior: Behavior normal.         Judgment: Judgment normal.             ECG 12 Lead    Date/Time: 4/22/2021 9:58 AM  Performed by: Adelina Alvarez DNP, APRN  Authorized by: Adelina Alvarez DNP, APRN   Comparison: compared with previous ECG from 4/9/2018  Rhythm comments: Atrial paced rhythm  Rate: normal  BPM: 60  Other findings: non-specific ST-T wave changes  Comments: No significant changes from previous EKG              Assessment:       Diagnosis Plan   1. Sick sinus syndrome (CMS/HCC)     2. Essential hypertension           Plan:     1. Sick sinus syndrome status post dual-chamber pacemaker: Continue with routine device checks.  Patient to get pacemaker check after visit today.  2. Hypertension: Overall sounds well controlled for age.  Asked him to continue to monitor at home and call if staying greater than 140/80 on average or for systolic blood pressure less than 110, especially in the summer months.  3. Lightheadedness with position changes: He is on multiple medications that can make him feel lightheaded with position changes.  He is not orthostatic in the office today.  He reports he is drinking barely any water  during the day, just enough to swallow his pills.  Highly recommended increasing his water intake and calling if symptoms worsen or fail to improve at which point may need to stop hydrochlorothiazide and increase other medication such as possible atenolol if blood pressure becomes elevated.  4. Obstructive sleep apnea: On CPAP therapy.  Using nightly.  5. BPH: Follows with urologist.  On Flomax and Proscar.  6. Chronic kidney disease: Managed by outside provider    Patient to follow-up with Dr. Garcia in 6 months or follow-up sooner if needed for any new, recurrent, or worsening symptoms or other issues or concerns.  Discussed in detail signs/symptoms that warrant sooner call or follow-up to the office.        Records reviewed including but not limited to 2018 EKG, 10/2020 pacemaker check, 7/2020 pacemaker check.           Your medication list          Accurate as of April 22, 2021 10:08 AM. If you have any questions, ask your nurse or doctor.            CONTINUE taking these medications      Instructions Last Dose Given Next Dose Due   amlodipine-olmesartan 5-40 MG per tablet  Commonly known as: AMAYA      Take 1 tablet by mouth Daily.       atenolol 50 MG tablet  Commonly known as: TENORMIN      TAKE 1 TABLET DAILY       finasteride 5 MG tablet  Commonly known as: PROSCAR      Take 1 tablet by mouth daily.       Flomax 0.4 MG capsule 24 hr capsule  Generic drug: tamsulosin      Take 1 capsule by mouth 2 (two) times a day.       hydroCHLOROthiazide 12.5 MG tablet  Commonly known as: HYDRODIURIL      TAKE 1 TABLET DAILY       metroNIDAZOLE 500 MG tablet  Commonly known as: Flagyl      Take 1 tablet by mouth 3 (Three) Times a Day.       rosuvastatin 5 MG tablet  Commonly known as: CRESTOR      TAKE 1 TABLET THREE TIMES A WEEK (TIME FOR FOLLOW UP VISIT)       sertraline 50 MG tablet  Commonly known as: ZOLOFT      AT THE START OF THERAPY TAKE ONE-HALF (1/2) TABLET DAILY FOR THE FIRST 10 DAYS, THEN 1 TABLET DAILY  THEREAFTER              The above medication changes may not have been made by this provider.  Patient's medication list was updated to reflect medications they are currently taking including medication changes made by other providers.            Thanks,    Adelina Alvarez, MELYSSA, APRN  04/22/2021         Dictated utilizing Dragon dictation

## 2021-06-07 RX ORDER — ATENOLOL 50 MG/1
TABLET ORAL
Qty: 90 TABLET | Refills: 3 | Status: SHIPPED | OUTPATIENT
Start: 2021-06-07 | End: 2021-11-03 | Stop reason: SDUPTHER

## 2021-06-14 RX ORDER — AMLODIPINE AND OLMESARTAN MEDOXOMIL 5; 40 MG/1; MG/1
TABLET ORAL
Qty: 90 TABLET | Refills: 3 | Status: SHIPPED | OUTPATIENT
Start: 2021-06-14 | End: 2021-11-03 | Stop reason: SDUPTHER

## 2021-06-29 RX ORDER — HYDROCHLOROTHIAZIDE 12.5 MG/1
TABLET ORAL
Qty: 90 TABLET | Refills: 0 | Status: SHIPPED | OUTPATIENT
Start: 2021-06-29 | End: 2021-09-27

## 2021-07-21 PROCEDURE — 93294 REM INTERROG EVL PM/LDLS PM: CPT | Performed by: INTERNAL MEDICINE

## 2021-07-21 PROCEDURE — 93296 REM INTERROG EVL PM/IDS: CPT | Performed by: INTERNAL MEDICINE

## 2021-09-27 RX ORDER — HYDROCHLOROTHIAZIDE 12.5 MG/1
TABLET ORAL
Qty: 90 TABLET | Refills: 1 | Status: SHIPPED | OUTPATIENT
Start: 2021-09-27 | End: 2022-03-28

## 2021-10-18 NOTE — TELEPHONE ENCOUNTER
Rx Refill Note  Requested Prescriptions     Pending Prescriptions Disp Refills   • sertraline (ZOLOFT) 50 MG tablet [Pharmacy Med Name: SERTRALINE HCL TABS 50MG] 90 tablet 3     Sig: AT THE START OF THERAPY TAKE ONE-HALF (1/2) TABLET DAILY FOR THE FIRST 10 DAYS, THEN 1 TABLET DAILY THEREAFTER      Last office visit with prescribing clinician: 12/30/2020      Next office visit with prescribing clinician: Visit date not found            Leela Lucas MA  10/18/21, 07:48 EDT

## 2021-10-28 ENCOUNTER — OFFICE VISIT (OUTPATIENT)
Dept: FAMILY MEDICINE CLINIC | Facility: CLINIC | Age: 81
End: 2021-10-28

## 2021-10-28 VITALS
HEART RATE: 76 BPM | WEIGHT: 205.2 LBS | DIASTOLIC BLOOD PRESSURE: 86 MMHG | BODY MASS INDEX: 32.98 KG/M2 | SYSTOLIC BLOOD PRESSURE: 156 MMHG | OXYGEN SATURATION: 96 % | TEMPERATURE: 97.5 F | HEIGHT: 66 IN

## 2021-10-28 DIAGNOSIS — E78.00 PURE HYPERCHOLESTEROLEMIA: Chronic | ICD-10-CM

## 2021-10-28 DIAGNOSIS — I10 ESSENTIAL HYPERTENSION: Chronic | ICD-10-CM

## 2021-10-28 DIAGNOSIS — Z23 NEED FOR VACCINATION: ICD-10-CM

## 2021-10-28 DIAGNOSIS — F33.42 RECURRENT MAJOR DEPRESSIVE DISORDER, IN FULL REMISSION (HCC): ICD-10-CM

## 2021-10-28 DIAGNOSIS — Z00.00 MEDICARE ANNUAL WELLNESS VISIT, SUBSEQUENT: Primary | ICD-10-CM

## 2021-10-28 PROCEDURE — G0439 PPPS, SUBSEQ VISIT: HCPCS | Performed by: FAMILY MEDICINE

## 2021-10-28 PROCEDURE — 1160F RVW MEDS BY RX/DR IN RCRD: CPT | Performed by: FAMILY MEDICINE

## 2021-10-28 PROCEDURE — G0008 ADMIN INFLUENZA VIRUS VAC: HCPCS | Performed by: FAMILY MEDICINE

## 2021-10-28 PROCEDURE — 90662 IIV NO PRSV INCREASED AG IM: CPT | Performed by: FAMILY MEDICINE

## 2021-10-28 PROCEDURE — 99214 OFFICE O/P EST MOD 30 MIN: CPT | Performed by: FAMILY MEDICINE

## 2021-10-28 PROCEDURE — 1170F FXNL STATUS ASSESSED: CPT | Performed by: FAMILY MEDICINE

## 2021-10-28 NOTE — PROGRESS NOTES
The ABCs of the Annual Wellness Visit  Subsequent Medicare Wellness Visit    Chief Complaint   Patient presents with   • Depression     follow up    • Medicare Wellness-subsequent      Subjective    History of Present Illness:  Mikey Crawley is a 81 y.o. male who presents for a Subsequent Medicare Wellness Visit.    The following portions of the patient's history were reviewed and   updated as appropriate: allergies, current medications, past family history, past medical history, past social history, past surgical history and problem list.    Compared to one year ago, the patient feels his physical   health is the same.    Compared to one year ago, the patient feels his mental   health is the same.    Recent Hospitalizations:  He was not admitted to the hospital during the last year.       Current Medical Providers:  Patient Care Team:  Sotero Torres MD as PCP - General (Family Medicine)  Dima Alvarado MD as Consulting Physician (Orthopedic Surgery)  Jacinto Maldonado MD as Consulting Physician (Orthopedic Surgery)    Outpatient Medications Prior to Visit   Medication Sig Dispense Refill   • amlodipine-olmesartan (AMAYA) 5-40 MG per tablet TAKE 1 TABLET DAILY 90 tablet 3   • atenolol (TENORMIN) 50 MG tablet TAKE 1 TABLET DAILY 90 tablet 3   • finasteride (PROSCAR) 5 MG tablet Take 1 tablet by mouth daily.     • hydroCHLOROthiazide (HYDRODIURIL) 12.5 MG tablet TAKE 1 TABLET DAILY 90 tablet 1   • metroNIDAZOLE (Flagyl) 500 MG tablet Take 1 tablet by mouth 3 (Three) Times a Day. 15 tablet 0   • rosuvastatin (CRESTOR) 5 MG tablet TAKE 1 TABLET THREE TIMES A WEEK (TIME FOR FOLLOW UP VISIT) 90 tablet 1   • tamsulosin (FLOMAX) 0.4 MG capsule Take 1 capsule by mouth 2 (two) times a day.     • sertraline (ZOLOFT) 50 MG tablet AT THE START OF THERAPY TAKE ONE-HALF (1/2) TABLET DAILY FOR THE FIRST 10 DAYS, THEN 1 TABLET DAILY THEREAFTER 30 tablet 0     No facility-administered medications prior to visit.       No opioid  "medication identified on active medication list. I have reviewed chart for other potential  high risk medication/s and harmful drug interactions in the elderly.          Aspirin is not on active medication list.  Aspirin use is not indicated based on review of current medical condition/s. Risk of harm outweighs potential benefits.  .    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  ACP discussion was held with the patient during this visit. Patient has an advance directive (not in EMR), copy requested.          Objective    Vitals:    10/28/21 0944   BP: 156/86   Pulse: 76   Temp: 97.5 °F (36.4 °C)   TempSrc: Temporal   SpO2: 96%   Weight: 93.1 kg (205 lb 3.2 oz)   Height: 168.9 cm (66.5\")     BMI Readings from Last 1 Encounters:   10/28/21 32.63 kg/m²   BMI is above normal parameters. Recommendations include: exercise counseling    Does the patient have evidence of cognitive impairment? No    Physical Exam            HEALTH RISK ASSESSMENT    Smoking Status:  Social History     Tobacco Use   Smoking Status Former Smoker   • Packs/day: 0.00   • Years: 1.00   • Pack years: 0.00   • Types: Cigars   • Start date: 1980   • Quit date: 1985   • Years since quittin.4   Smokeless Tobacco Never Used   Tobacco Comment    never smoked cigarettes only cigars now and then     Alcohol Consumption:  Social History     Substance and Sexual Activity   Alcohol Use Yes   • Alcohol/week: 2.0 standard drinks   • Types: 1 Cans of beer, 1 Shots of liquor per week "    Comment: seldom     Fall Risk Screen:    LORNA Fall Risk Assessment was completed, and patient is at LOW risk for falls.Assessment completed on:10/28/2021    Depression Screening:  PHQ-2/PHQ-9 Depression Screening 10/28/2021   Little interest or pleasure in doing things 0   Feeling down, depressed, or hopeless 0   Trouble falling or staying asleep, or sleeping too much -   Feeling tired or having little energy -   Poor appetite or overeating -   Feeling bad about yourself - or that you are a failure or have let yourself or your family down -   Trouble concentrating on things, such as reading the newspaper or watching television -   Moving or speaking so slowly that other people could have noticed. Or the opposite - being so fidgety or restless that you have been moving around a lot more than usual -   Thoughts that you would be better off dead, or of hurting yourself in some way -   Total Score 0   If you checked off any problems, how difficult have these problems made it for you to do your work, take care of things at home, or get along with other people? -       Health Habits and Functional and Cognitive Screening:  Functional & Cognitive Status 10/28/2021   Do you have difficulty preparing food and eating? No   Do you have difficulty bathing yourself, getting dressed or grooming yourself? No   Do you have difficulty using the toilet? No   Do you have difficulty moving around from place to place? No   Do you have trouble with steps or getting out of a bed or a chair? No   Current Diet Well Balanced Diet   Dental Exam Not up to date   Eye Exam Up to date   Exercise (times per week) 0 times per week   Current Exercises Include No Regular Exercise   Current Exercise Activities Include -   Do you need help using the phone?  No   Are you deaf or do you have serious difficulty hearing?  Yes   Do you need help with transportation? No   Do you need help shopping? No   Do you need help preparing meals?  No   Do you  need help with housework?  No   Do you need help with laundry? No   Do you need help taking your medications? No   Do you need help managing money? No   Do you ever drive or ride in a car without wearing a seat belt? No   Have you felt unusual stress, anger or loneliness in the last month? Yes   Who do you live with? Spouse   If you need help, do you have trouble finding someone available to you? No   Have you been bothered in the last four weeks by sexual problems? No   Do you have difficulty concentrating, remembering or making decisions? No       Age-appropriate Screening Schedule:  Refer to the list below for future screening recommendations based on patient's age, sex and/or medical conditions. Orders for these recommended tests are listed in the plan section. The patient has been provided with a written plan.    Health Maintenance   Topic Date Due   • ZOSTER VACCINE (2 of 3) 01/28/2014   • LIPID PANEL  01/27/2021   • TDAP/TD VACCINES (2 - Td or Tdap) 09/28/2028   • INFLUENZA VACCINE  Completed              Assessment/Plan   CMS Preventative Services Quick Reference  Risk Factors Identified During Encounter  Depression/Dysphoria  Immunizations Discussed/Encouraged (specific Immunizations; Influenza and COVID19  The above risks/problems have been discussed with the patient.  Follow up actions/plans if indicated are seen below in the Assessment/Plan Section.  Pertinent information has been shared with the patient in the After Visit Summary.    Diagnoses and all orders for this visit:    1. Medicare annual wellness visit, subsequent (Primary)    2. Need for vaccination  -     Fluzone High-Dose 65+yrs ([id number])    3. Recurrent major depressive disorder, in full remission (HCC)    4. Essential hypertension    5. Pure hypercholesterolemia    Other orders  -     sertraline (ZOLOFT) 50 MG tablet; Take 1 tablet by mouth Daily.  Dispense: 90 tablet; Refill: 3        Follow Up:   No follow-ups on file.     An After  Visit Summary and PPPS were made available to the patient.

## 2021-10-28 NOTE — PROGRESS NOTES
"Chief Complaint  Depression (follow up ) and Medicare Wellness-subsequent    Subjective          Mikey Crawley presents to [facility]  History of Present Illness     Follow-up major depression.  For about a year now is been taking sertraline.  He thinks it is helping him.  He is had some significant stressors.  Wife with hypoglycemia.  Somewhat strokelike symptoms.  He states he is overall doing well otherwise.  And wants to continue the medication.  No side effects.    Hypertension.  He continues on amlodipine/olmesartan, hydrochlorothiazide and atenolol.  He is also on tamsulosin prescribed by his urologist.  His blood pressure today is elevated 150 systolic.  Repeat is 160 systolic.  He is not been checking his blood pressure at home.  No cardiovascular symptoms.    Hyperlipidemia.  He continues on rosuvastatin.  He also follows with cardiology for sick sinus syndrome.  He has had no syncopal episodes.    Objective   Vital Signs:   /86   Pulse 76   Temp 97.5 °F (36.4 °C) (Temporal)   Ht 168.9 cm (66.5\")   Wt 93.1 kg (205 lb 3.2 oz)   SpO2 96%   BMI 32.63 kg/m²     Physical Exam  Vitals and nursing note reviewed.   Constitutional:       General: He is not in acute distress.     Appearance: He is well-developed.   HENT:      Right Ear: Tympanic membrane and ear canal normal.      Left Ear: Tympanic membrane and ear canal normal.      Nose: Nose normal.      Mouth/Throat:      Mouth: Mucous membranes are moist.      Pharynx: No oropharyngeal exudate or posterior oropharyngeal erythema.   Eyes:      Conjunctiva/sclera: Conjunctivae normal.   Neck:      Thyroid: No thyromegaly.   Cardiovascular:      Rate and Rhythm: Normal rate and regular rhythm.      Heart sounds: Normal heart sounds.   Pulmonary:      Effort: Pulmonary effort is normal. No respiratory distress.      Breath sounds: Normal breath sounds.   Abdominal:      General: There is no distension.      Palpations: " There is no mass.      Tenderness: There is no abdominal tenderness. There is no guarding or rebound.      Hernia: No hernia is present.   Musculoskeletal:         General: Normal range of motion.      Cervical back: Normal range of motion.   Lymphadenopathy:      Cervical: No cervical adenopathy.   Skin:     General: Skin is warm and dry.   Neurological:      General: No focal deficit present.   Psychiatric:         Mood and Affect: Mood normal.         Behavior: Behavior normal.         Thought Content: Thought content normal.         Judgment: Judgment normal.        Result Review :   :                Assessment and Plan    Diagnoses and all orders for this visit:    1. Medicare annual wellness visit, subsequent (Primary)    2. Need for vaccination  -     Fluzone High-Dose 65+yrs ([id number])    3. Recurrent major depressive disorder, in full remission (HCC)    4. Essential hypertension    5. Pure hypercholesterolemia    Other orders  -     sertraline (ZOLOFT) 50 MG tablet; Take 1 tablet by mouth Daily.  Dispense: 90 tablet; Refill: 3      Major depression.  Mostly in remission.  Continue sertraline.    Hypertension.  May need better control.  He is going to be checking his blood pressure at home and sending me numbers via the [employer] portal within 3 or 4 weeks.  If running elevated, will need medication adjustment or further visit.  Otherwise I will see him in 6 months for follow-up.    Hyperlipidemia.  Continue rosuvastatin.      Follow Up   No follow-ups on file.  Patient was given instructions and counseling regarding his condition or for health maintenance advice. Please see specific information pulled into the AVS if appropriate.

## 2021-11-03 ENCOUNTER — OFFICE VISIT (OUTPATIENT)
Dept: CARDIOLOGY | Facility: CLINIC | Age: 81
End: 2021-11-03

## 2021-11-03 ENCOUNTER — CLINICAL SUPPORT NO REQUIREMENTS (OUTPATIENT)
Dept: CARDIOLOGY | Facility: CLINIC | Age: 81
End: 2021-11-03

## 2021-11-03 VITALS
SYSTOLIC BLOOD PRESSURE: 118 MMHG | DIASTOLIC BLOOD PRESSURE: 80 MMHG | WEIGHT: 205 LBS | HEART RATE: 76 BPM | OXYGEN SATURATION: 94 % | BODY MASS INDEX: 32.18 KG/M2 | HEIGHT: 67 IN

## 2021-11-03 DIAGNOSIS — I49.5 SICK SINUS SYNDROME (HCC): Primary | ICD-10-CM

## 2021-11-03 DIAGNOSIS — I10 ESSENTIAL HYPERTENSION: Chronic | ICD-10-CM

## 2021-11-03 DIAGNOSIS — E78.2 MIXED HYPERLIPIDEMIA: ICD-10-CM

## 2021-11-03 PROCEDURE — 93280 PM DEVICE PROGR EVAL DUAL: CPT | Performed by: INTERNAL MEDICINE

## 2021-11-03 PROCEDURE — 99214 OFFICE O/P EST MOD 30 MIN: CPT | Performed by: INTERNAL MEDICINE

## 2021-11-03 PROCEDURE — 93000 ELECTROCARDIOGRAM COMPLETE: CPT | Performed by: INTERNAL MEDICINE

## 2021-11-03 RX ORDER — AMLODIPINE AND OLMESARTAN MEDOXOMIL 5; 40 MG/1; MG/1
1 TABLET ORAL DAILY
Qty: 90 TABLET | Refills: 3 | Status: SHIPPED | OUTPATIENT
Start: 2021-11-03 | End: 2022-11-14

## 2021-11-03 RX ORDER — ATENOLOL 50 MG/1
50 TABLET ORAL DAILY
Qty: 90 TABLET | Refills: 3 | Status: SHIPPED | OUTPATIENT
Start: 2021-11-03 | End: 2022-11-07

## 2021-11-03 NOTE — PROGRESS NOTES
"      CARDIOLOGY    Michelle Garcia MD    ENCOUNTER DATE:  11/03/2021    Mikey Crawley / 81 y.o. / male        CHIEF COMPLAINT / REASON FOR OFFICE VISIT     sick sinus syndrome (6 month follow up)      HISTORY OF PRESENT ILLNESS       HPI    Mikey Crawley is a 81 y.o. male     This is a patient previously followed by Dr. Rodriguez.  He has a history of sick sinus syndrome and is status post pacemaker.     He has been doing well.  He has some dizziness and shortness of breath with exertion.  He does not do a lot of physical activity any longer.  He is not having any chest pain or syncope.    REVIEW OF SYSTEMS     Review of Systems   Constitutional: Negative for chills, fever, weight gain and weight loss.   Cardiovascular: Negative for leg swelling.   Respiratory: Negative for cough, snoring and wheezing.    Hematologic/Lymphatic: Negative for bleeding problem. Does not bruise/bleed easily.   Skin: Negative for color change.   Musculoskeletal: Negative for falls, joint pain and myalgias.   Gastrointestinal: Negative for melena.   Genitourinary: Negative for hematuria.   Neurological: Negative for excessive daytime sleepiness.   Psychiatric/Behavioral: Negative for depression. The patient is not nervous/anxious.          VITAL SIGNS     Visit Vitals  /80 (BP Location: Left arm)   Pulse 76   Ht 170.2 cm (67\")   Wt 93 kg (205 lb)   SpO2 94%   BMI 32.11 kg/m²         Wt Readings from Last 3 Encounters:   11/03/21 93 kg (205 lb)   10/28/21 93.1 kg (205 lb 3.2 oz)   04/22/21 93.4 kg (206 lb)     Body mass index is 32.11 kg/m².      PHYSICAL EXAMINATION     Constitutional:       General: Not in acute distress.  Neck:      Vascular: No carotid bruit or JVD.   Pulmonary:      Effort: Pulmonary effort is normal.      Breath sounds: Normal breath sounds.   Cardiovascular:      Normal rate. Regular rhythm.      Murmurs: There is no murmur.   Psychiatric:         Mood and Affect: Mood and affect normal.           REVIEWED " DATA       ECG 12 Lead    Date/Time: 11/3/2021 11:20 AM  Performed by: Michelle Garcia MD  Authorized by: Michelle Garcia MD   Comparison: compared with previous ECG from 4/22/2021  BPM: 66  Conduction: conduction normal  ST Segments: ST segments normal  Comments: Atrially paced rhythm.                  Lab Results   Component Value Date    GLUCOSE 105 (H) 01/27/2020    BUN 14 01/27/2020    CREATININE 1.36 (H) 01/27/2020    EGFRIFNONA 51 (L) 01/27/2020    EGFRIFAFRI 61 01/27/2020    BCR 10.3 01/27/2020    K 3.9 01/27/2020    CO2 26.1 01/27/2020    CALCIUM 9.2 01/27/2020    PROTENTOTREF 7.0 01/27/2020    ALBUMIN 4.60 01/27/2020    LABIL2 1.9 01/27/2020    AST 23 01/27/2020    ALT 26 01/27/2020       ASSESSMENT & PLAN      Diagnosis Plan   1. Sick sinus syndrome (HCC)     2. Mixed hyperlipidemia     3. Essential hypertension           1.  Sick sinus syndrome status post dual-chamber pacemaker.  Check pacemaker today.  2.  Hypertension.  Well controlled.  3.  Chronic kidney disease.  4.  Dizzy spells.  I cut back on his diuretic previously.     He will follow-up with Cynthia in 1 year with a pacemaker check.      Orders Placed This Encounter   Procedures   • ECG 12 Lead     This order was created via procedure documentation     Order Specific Question:   Release to patient     Answer:   Immediate           MEDICATIONS         Discharge Medications          Accurate as of November 3, 2021 11:21 AM. If you have any questions, ask your nurse or doctor.            Continue These Medications      Instructions Start Date   amlodipine-olmesartan 5-40 MG per tablet  Commonly known as: AMAYA   1 tablet, Oral, Daily      atenolol 50 MG tablet  Commonly known as: TENORMIN   50 mg, Oral, Daily      finasteride 5 MG tablet  Commonly known as: PROSCAR   1 tablet, Oral, Daily      Flomax 0.4 MG capsule 24 hr capsule  Generic drug: tamsulosin   1 capsule, Oral, 2 Times Daily      hydroCHLOROthiazide 12.5 MG tablet  Commonly known as:  HYDRODIURIL   TAKE 1 TABLET DAILY      metroNIDAZOLE 500 MG tablet  Commonly known as: Flagyl   500 mg, Oral, 3 Times Daily      rosuvastatin 5 MG tablet  Commonly known as: CRESTOR   TAKE 1 TABLET THREE TIMES A WEEK (TIME FOR FOLLOW UP VISIT)      sertraline 50 MG tablet  Commonly known as: ZOLOFT   50 mg, Oral, Daily               Michelle Garcia MD  11/03/21  11:21 EDT    **[place] Disclaimer:   Much of this encounter note is an electronic transcription/translation of spoken language to printed text. The electronic translation of spoken language may permit erroneous, or at times, nonsensical words or phrases to be inadvertently transcribed. Although I have reviewed the note for such errors, some may still exist.

## 2021-11-15 ENCOUNTER — TELEPHONE (OUTPATIENT)
Dept: FAMILY MEDICINE CLINIC | Facility: CLINIC | Age: 81
End: 2021-11-15

## 2021-11-15 NOTE — TELEPHONE ENCOUNTER
THE PATIENT STATES THAT HE WOULD LIKE TO SPEAK WITH JOSAFAT, WHEN SHE IS AVAILABLE. HE DID NOT WISH TO GIVE SPECIFICS. PLEASE ADVISE BY CALLING [phone]

## 2022-03-14 NOTE — TELEPHONE ENCOUNTER
Rx Refill Note  Requested Prescriptions     Pending Prescriptions Disp Refills   • rosuvastatin (CRESTOR) 5 MG tablet [Pharmacy Med Name: ROSUVASTATIN TABS 5MG] 90 tablet 3     Sig: TAKE 1 TABLET THREE TIMES A WEEK (TIME FOR FOLLOW UP VISIT)      Last office visit with prescribing clinician: Visit date not found      Next office visit with prescribing clinician: Visit date not found            Cyndi Wall MA  03/14/22, 13:10 EDT

## 2022-03-21 RX ORDER — ROSUVASTATIN CALCIUM 5 MG/1
TABLET, COATED ORAL
Qty: 90 TABLET | Refills: 0 | Status: SHIPPED | OUTPATIENT
Start: 2022-03-21 | End: 2022-09-09

## 2022-03-28 RX ORDER — HYDROCHLOROTHIAZIDE 12.5 MG/1
TABLET ORAL
Qty: 90 TABLET | Refills: 1 | Status: SHIPPED | OUTPATIENT
Start: 2022-03-28 | End: 2022-09-22

## 2022-03-28 NOTE — TELEPHONE ENCOUNTER
Rx Refill Note  Requested Prescriptions     Pending Prescriptions Disp Refills   • hydroCHLOROthiazide (HYDRODIURIL) 12.5 MG tablet [Pharmacy Med Name: HYDROCHLOROTHIAZIDE TABS 12.5MG] 90 tablet 3     Sig: TAKE 1 TABLET DAILY      Last office visit with prescribing clinician: 10/28/2021      Next office visit with prescribing clinician: 4/28/2022            Cyndi Wall MA  03/28/22, 08:51 EDT

## 2022-04-08 DIAGNOSIS — I10 ESSENTIAL HYPERTENSION: Primary | ICD-10-CM

## 2022-04-08 DIAGNOSIS — E78.2 MIXED HYPERLIPIDEMIA: ICD-10-CM

## 2022-04-14 ENCOUNTER — TELEPHONE (OUTPATIENT)
Dept: FAMILY MEDICINE CLINIC | Facility: CLINIC | Age: 82
End: 2022-04-14

## 2022-04-14 NOTE — TELEPHONE ENCOUNTER
Hub staff attempted to follow warm transfer process and was unsuccessful     Caller: Mikey Crawley    Relationship to patient: Self    Best call back number: [phone]    Patient is needing: TO SPEAK WITH SOMEONE. HE IS RETURNING A MISSED CALL FROM TODAY 04/14/22.

## 2022-04-28 ENCOUNTER — OFFICE VISIT (OUTPATIENT)
Dept: FAMILY MEDICINE CLINIC | Facility: CLINIC | Age: 82
End: 2022-04-28

## 2022-04-28 VITALS
TEMPERATURE: 96.2 F | WEIGHT: 208.8 LBS | HEART RATE: 64 BPM | HEIGHT: 67 IN | BODY MASS INDEX: 32.77 KG/M2 | SYSTOLIC BLOOD PRESSURE: 100 MMHG | DIASTOLIC BLOOD PRESSURE: 57 MMHG | OXYGEN SATURATION: 98 %

## 2022-04-28 DIAGNOSIS — M15.9 PRIMARY OSTEOARTHRITIS INVOLVING MULTIPLE JOINTS: ICD-10-CM

## 2022-04-28 DIAGNOSIS — G62.9 NEUROPATHY: ICD-10-CM

## 2022-04-28 DIAGNOSIS — F33.42 RECURRENT MAJOR DEPRESSIVE DISORDER, IN FULL REMISSION: Chronic | ICD-10-CM

## 2022-04-28 DIAGNOSIS — E78.2 MIXED HYPERLIPIDEMIA: Chronic | ICD-10-CM

## 2022-04-28 DIAGNOSIS — I10 ESSENTIAL HYPERTENSION: Primary | Chronic | ICD-10-CM

## 2022-04-28 PROCEDURE — 99214 OFFICE O/P EST MOD 30 MIN: CPT | Performed by: FAMILY MEDICINE

## 2022-04-28 NOTE — PROGRESS NOTES
"Chief Complaint  Hyperlipidemia    Subjective          Mikey Crawley presents to [facility]  History of Present Illness     Hypertension follow-up.  His blood pressure is little bit lower today.  He continues on the amlodipine on losartan and atenolol.  He also continues hydrochlorothiazide.  No concerning cardiovascular symptoms.  He states occasionally he will feel little lightheaded.  But not often.    Hyperlipidemia.  Continues rosuvastatin 5 mg a day.  Lipid panel overall favorable.    Major depression.  In remission.  Continues on sertraline 50 mg a day.  Is having some stressors with his wife's illness.    Pain.  Joints.  Hands mostly fingers.  Also feet, mostly left ankle and left heel.  He also feels like his feet are cold and hot sometimes.  Specially at nighttime in bed.  They feel very sensitive at times.  Getting worse.  Not the hands however.  He states the joints do not get hot swollen and red.  They are stiff in the morning though.  He does have some deformities in his fingers, symmetrically.    Objective   Vital Signs:   /57   Pulse 64   Temp 96.2 °F (35.7 °C) (Temporal)   Ht 170.2 cm (67\")   Wt 94.7 kg (208 lb 12.8 oz)   SpO2 98%   BMI 32.70 kg/m²     Physical Exam  Vitals and nursing note reviewed.   Constitutional:       General: He is not in acute distress.     Appearance: He is well-developed.   Cardiovascular:      Rate and Rhythm: Normal rate and regular rhythm.      Heart sounds: Normal heart sounds.   Pulmonary:      Effort: Pulmonary effort is normal.      Breath sounds: Normal breath sounds.   Musculoskeletal:         General: Deformity present.      Cervical back: Normal range of motion.      Comments: He has bilateral distal interphalangeal joint deviations.  Symmetric.  However no synovitis.  The MCP joints and wrists are unremarkable.  Foot exam reveals no skin lesions.  Good pulses.  No definite joint swelling.  No pain at the plantar " fascia.   Skin:     General: Skin is warm and dry.   Psychiatric:         Mood and Affect: Mood normal.        Result Review :                 Assessment and Plan    Diagnoses and all orders for this visit:    1. Essential hypertension (Primary)    2. Recurrent major depressive disorder, in full remission (HCC)    3. Mixed hyperlipidemia    4. Neuropathy  -     Cyclic Citrul Peptide Antibody, IgG / IgA  -     Sedimentation Rate  -     C-reactive Protein  -     Rheumatoid Factor, Quant  -     CBC & Differential  -     TSH Rfx On Abnormal To Free T4    5. Primary osteoarthritis involving multiple joints  -     Cyclic Citrul Peptide Antibody, IgG / IgA  -     Sedimentation Rate  -     C-reactive Protein  -     Rheumatoid Factor, Quant  -     CBC & Differential  -     TSH Rfx On Abnormal To Free T4      Hypertension.  Controlled.  I am concerned about it being overcontrolled.  If he has more lightheadedness he is going to let us know otherwise I will see him back in 6 months.    Major depression.  Mostly in remission.  Continue sertraline.  He has stressors with his wife's illness.    Hyperlipidemia.  Continue statin.    Questionable lower extremity neuropathy.  I did offer a trial of gabapentin.  He wants to hold off for now.  We discussed the benefits and side effects.  He also has primarily osteoarthritis of multiple joints.  However given some of his finger deformities, I cannot completely rule out rheumatoid arthritis.  Ordering the above labs.  To consider hand films also.  I will see him back as needed or 6 months.             Follow Up   No follow-ups on file.  Patient was given instructions and counseling regarding his condition or for health maintenance advice. Please see specific information pulled into the AVS if appropriate.

## 2022-04-30 LAB
BASOPHILS # BLD AUTO: 0.1 X10E3/UL (ref 0–0.2)
BASOPHILS NFR BLD AUTO: 1 %
CCP IGA+IGG SERPL IA-ACNC: 3 UNITS (ref 0–19)
CRP SERPL-MCNC: <1 MG/L (ref 0–10)
EOSINOPHIL # BLD AUTO: 0.4 X10E3/UL (ref 0–0.4)
EOSINOPHIL NFR BLD AUTO: 5 %
ERYTHROCYTE [DISTWIDTH] IN BLOOD BY AUTOMATED COUNT: 12.2 % (ref 11.6–15.4)
ERYTHROCYTE [SEDIMENTATION RATE] IN BLOOD BY WESTERGREN METHOD: 10 MM/HR (ref 0–30)
HCT VFR BLD AUTO: 43.4 % (ref 37.5–51)
HGB BLD-MCNC: 14.4 G/DL (ref 13–17.7)
IMM GRANULOCYTES # BLD AUTO: 0 X10E3/UL (ref 0–0.1)
IMM GRANULOCYTES NFR BLD AUTO: 0 %
LYMPHOCYTES # BLD AUTO: 2.2 X10E3/UL (ref 0.7–3.1)
LYMPHOCYTES NFR BLD AUTO: 32 %
MCH RBC QN AUTO: 31.4 PG (ref 26.6–33)
MCHC RBC AUTO-ENTMCNC: 33.2 G/DL (ref 31.5–35.7)
MCV RBC AUTO: 95 FL (ref 79–97)
MONOCYTES # BLD AUTO: 0.6 X10E3/UL (ref 0.1–0.9)
MONOCYTES NFR BLD AUTO: 9 %
NEUTROPHILS # BLD AUTO: 3.8 X10E3/UL (ref 1.4–7)
NEUTROPHILS NFR BLD AUTO: 53 %
PLATELET # BLD AUTO: 199 X10E3/UL (ref 150–450)
RBC # BLD AUTO: 4.59 X10E6/UL (ref 4.14–5.8)
RHEUMATOID FACT SERPL-ACNC: <10 IU/ML
TSH SERPL DL<=0.005 MIU/L-ACNC: 2.48 UIU/ML (ref 0.45–4.5)
WBC # BLD AUTO: 7.1 X10E3/UL (ref 3.4–10.8)

## 2022-05-12 PROCEDURE — 93294 REM INTERROG EVL PM/LDLS PM: CPT | Performed by: INTERNAL MEDICINE

## 2022-05-12 PROCEDURE — 93296 REM INTERROG EVL PM/IDS: CPT | Performed by: INTERNAL MEDICINE

## 2022-05-16 ENCOUNTER — TELEPHONE (OUTPATIENT)
Dept: CARDIOLOGY | Facility: CLINIC | Age: 82
End: 2022-05-16

## 2022-05-16 DIAGNOSIS — I49.5 SICK SINUS SYNDROME: Primary | ICD-10-CM

## 2022-05-16 NOTE — TELEPHONE ENCOUNTER
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 Usually MVP mode set to 60. AP97%, <1%  RV lead MDT 5086  RA lead MDT 5086    Not dependent by history.    No AC.    PT says he does feel a little SOA due to the VVI65 mode switch and loss of AV synchrony.    PT also had NSVT on 5/8/22 V rate 167 for 13 beats

## 2022-06-06 ENCOUNTER — PATIENT MESSAGE (OUTPATIENT)
Dept: CARDIOLOGY | Facility: CLINIC | Age: 82
End: 2022-06-06

## 2022-06-07 ENCOUNTER — TRANSCRIBE ORDERS (OUTPATIENT)
Dept: CARDIOLOGY | Facility: CLINIC | Age: 82
End: 2022-06-07

## 2022-06-07 DIAGNOSIS — Z01.810 PRE-OPERATIVE CARDIOVASCULAR EXAMINATION: ICD-10-CM

## 2022-06-07 DIAGNOSIS — Z01.818 OTHER SPECIFIED PRE-OPERATIVE EXAMINATION: Primary | ICD-10-CM

## 2022-06-07 DIAGNOSIS — Z13.6 SCREENING FOR ISCHEMIC HEART DISEASE: ICD-10-CM

## 2022-06-07 NOTE — TELEPHONE ENCOUNTER
From: Mikey Crawley  To: Michelle Garcia MD  Sent: 6/6/2022 8:33 PM EDT  Subject: Pacemaker    Dr. Garcia this is Gary Crawley I wanted to tell you that for about five months now but I’ve been checking my pacemaker every month and they told me 1 May or the last of April that the batteries out they were going to set up surgery and I haven’t heard anything at all hi I just want to say I haven’t felt good for months I’ve been weak I’ve got no strength I get dizzy I can’t hardly do anything without taking a break so please let me know what you can do about setting up that surgery thank you

## 2022-06-08 ENCOUNTER — LAB (OUTPATIENT)
Dept: LAB | Facility: HOSPITAL | Age: 82
End: 2022-06-08

## 2022-06-08 DIAGNOSIS — Z01.818 OTHER SPECIFIED PRE-OPERATIVE EXAMINATION: ICD-10-CM

## 2022-06-08 DIAGNOSIS — Z13.6 SCREENING FOR ISCHEMIC HEART DISEASE: ICD-10-CM

## 2022-06-08 DIAGNOSIS — Z01.810 PRE-OPERATIVE CARDIOVASCULAR EXAMINATION: ICD-10-CM

## 2022-06-08 LAB
ANION GAP SERPL CALCULATED.3IONS-SCNC: 11 MMOL/L (ref 5–15)
BASOPHILS # BLD AUTO: 0.07 10*3/MM3 (ref 0–0.2)
BASOPHILS NFR BLD AUTO: 1.1 % (ref 0–1.5)
BUN SERPL-MCNC: 14 MG/DL (ref 8–23)
BUN/CREAT SERPL: 12.2 (ref 7–25)
CALCIUM SPEC-SCNC: 9.2 MG/DL (ref 8.6–10.5)
CHLORIDE SERPL-SCNC: 105 MMOL/L (ref 98–107)
CO2 SERPL-SCNC: 27 MMOL/L (ref 22–29)
CREAT SERPL-MCNC: 1.15 MG/DL (ref 0.76–1.27)
DEPRECATED RDW RBC AUTO: 41.3 FL (ref 37–54)
EGFRCR SERPLBLD CKD-EPI 2021: 63.9 ML/MIN/1.73
EOSINOPHIL # BLD AUTO: 0.37 10*3/MM3 (ref 0–0.4)
EOSINOPHIL NFR BLD AUTO: 6 % (ref 0.3–6.2)
ERYTHROCYTE [DISTWIDTH] IN BLOOD BY AUTOMATED COUNT: 11.9 % (ref 12.3–15.4)
GLUCOSE SERPL-MCNC: 108 MG/DL (ref 65–99)
HCT VFR BLD AUTO: 44.6 % (ref 37.5–51)
HGB BLD-MCNC: 15 G/DL (ref 13–17.7)
IMM GRANULOCYTES # BLD AUTO: 0.01 10*3/MM3 (ref 0–0.05)
IMM GRANULOCYTES NFR BLD AUTO: 0.2 % (ref 0–0.5)
LYMPHOCYTES # BLD AUTO: 1.78 10*3/MM3 (ref 0.7–3.1)
LYMPHOCYTES NFR BLD AUTO: 28.7 % (ref 19.6–45.3)
MCH RBC QN AUTO: 31.8 PG (ref 26.6–33)
MCHC RBC AUTO-ENTMCNC: 33.6 G/DL (ref 31.5–35.7)
MCV RBC AUTO: 94.7 FL (ref 79–97)
MONOCYTES # BLD AUTO: 0.53 10*3/MM3 (ref 0.1–0.9)
MONOCYTES NFR BLD AUTO: 8.5 % (ref 5–12)
NEUTROPHILS NFR BLD AUTO: 3.44 10*3/MM3 (ref 1.7–7)
NEUTROPHILS NFR BLD AUTO: 55.5 % (ref 42.7–76)
NRBC BLD AUTO-RTO: 0 /100 WBC (ref 0–0.2)
PLATELET # BLD AUTO: 197 10*3/MM3 (ref 140–450)
PMV BLD AUTO: 10.8 FL (ref 6–12)
POTASSIUM SERPL-SCNC: 4.8 MMOL/L (ref 3.5–5.2)
RBC # BLD AUTO: 4.71 10*6/MM3 (ref 4.14–5.8)
SARS-COV-2 ORF1AB RESP QL NAA+PROBE: NOT DETECTED
SODIUM SERPL-SCNC: 143 MMOL/L (ref 136–145)
WBC NRBC COR # BLD: 6.2 10*3/MM3 (ref 3.4–10.8)

## 2022-06-08 PROCEDURE — C9803 HOPD COVID-19 SPEC COLLECT: HCPCS

## 2022-06-08 PROCEDURE — U0004 COV-19 TEST NON-CDC HGH THRU: HCPCS

## 2022-06-08 PROCEDURE — 36415 COLL VENOUS BLD VENIPUNCTURE: CPT

## 2022-06-08 PROCEDURE — 80048 BASIC METABOLIC PNL TOTAL CA: CPT

## 2022-06-08 PROCEDURE — 85025 COMPLETE CBC W/AUTO DIFF WBC: CPT

## 2022-06-10 ENCOUNTER — HOSPITAL ENCOUNTER (OUTPATIENT)
Facility: HOSPITAL | Age: 82
Setting detail: HOSPITAL OUTPATIENT SURGERY
Discharge: HOME | End: 2022-06-10
Attending: INTERNAL MEDICINE | Admitting: INTERNAL MEDICINE

## 2022-06-10 VITALS
DIASTOLIC BLOOD PRESSURE: 78 MMHG | BODY MASS INDEX: 32.18 KG/M2 | SYSTOLIC BLOOD PRESSURE: 146 MMHG | RESPIRATION RATE: 18 BRPM | HEART RATE: 59 BPM | TEMPERATURE: 97 F | HEIGHT: 67 IN | OXYGEN SATURATION: 96 % | WEIGHT: 205 LBS

## 2022-06-10 DIAGNOSIS — G89.18 POST-OP PAIN: Primary | ICD-10-CM

## 2022-06-10 DIAGNOSIS — I49.5 SICK SINUS SYNDROME: ICD-10-CM

## 2022-06-10 PROCEDURE — 33228 REMV&REPLC PM GEN DUAL LEAD: CPT | Performed by: INTERNAL MEDICINE

## 2022-06-10 PROCEDURE — 25010000002 FENTANYL CITRATE (PF) 50 MCG/ML SOLUTION: Performed by: INTERNAL MEDICINE

## 2022-06-10 PROCEDURE — 25010000002 MIDAZOLAM PER 1 MG: Performed by: INTERNAL MEDICINE

## 2022-06-10 PROCEDURE — C1785 PMKR, DUAL, RATE-RESP: HCPCS | Performed by: INTERNAL MEDICINE

## 2022-06-10 PROCEDURE — 25010000002 CEFAZOLIN IN DEXTROSE 2-4 GM/100ML-% SOLUTION: Performed by: INTERNAL MEDICINE

## 2022-06-10 PROCEDURE — 0 LIDOCAINE 1 % SOLUTION: Performed by: INTERNAL MEDICINE

## 2022-06-10 DEVICE — GEN PM AZURE XT SURESCAN DR MRI: Type: IMPLANTABLE DEVICE | Site: CHEST | Status: FUNCTIONAL

## 2022-06-10 RX ORDER — MIDAZOLAM HYDROCHLORIDE 1 MG/ML
INJECTION INTRAMUSCULAR; INTRAVENOUS AS NEEDED
Status: DISCONTINUED | OUTPATIENT
Start: 2022-06-10 | End: 2022-06-10 | Stop reason: HOSPADM

## 2022-06-10 RX ORDER — SODIUM CHLORIDE 0.9 % (FLUSH) 0.9 %
10 SYRINGE (ML) INJECTION AS NEEDED
Status: DISCONTINUED | OUTPATIENT
Start: 2022-06-10 | End: 2022-06-10 | Stop reason: HOSPADM

## 2022-06-10 RX ORDER — CEFAZOLIN SODIUM 2 G/100ML
INJECTION, SOLUTION INTRAVENOUS CONTINUOUS PRN
Status: DISCONTINUED | OUTPATIENT
Start: 2022-06-10 | End: 2022-06-10 | Stop reason: HOSPADM

## 2022-06-10 RX ORDER — OXYCODONE HYDROCHLORIDE AND ACETAMINOPHEN 5; 325 MG/1; MG/1
1-2 TABLET ORAL EVERY 4 HOURS PRN
Qty: 10 TABLET | Refills: 0 | Status: SHIPPED | OUTPATIENT
Start: 2022-06-10 | End: 2022-11-08 | Stop reason: ALTCHOICE

## 2022-06-10 RX ORDER — FENTANYL CITRATE 50 UG/ML
INJECTION, SOLUTION INTRAMUSCULAR; INTRAVENOUS AS NEEDED
Status: DISCONTINUED | OUTPATIENT
Start: 2022-06-10 | End: 2022-06-10 | Stop reason: HOSPADM

## 2022-06-10 RX ORDER — SODIUM CHLORIDE 9 MG/ML
75 INJECTION, SOLUTION INTRAVENOUS CONTINUOUS
Status: DISCONTINUED | OUTPATIENT
Start: 2022-06-10 | End: 2022-06-10 | Stop reason: HOSPADM

## 2022-06-10 RX ORDER — LIDOCAINE HYDROCHLORIDE 10 MG/ML
INJECTION, SOLUTION INFILTRATION; PERINEURAL AS NEEDED
Status: DISCONTINUED | OUTPATIENT
Start: 2022-06-10 | End: 2022-06-10 | Stop reason: HOSPADM

## 2022-06-10 RX ORDER — SODIUM CHLORIDE 0.9 % (FLUSH) 0.9 %
10 SYRINGE (ML) INJECTION EVERY 12 HOURS SCHEDULED
Status: DISCONTINUED | OUTPATIENT
Start: 2022-06-10 | End: 2022-06-10 | Stop reason: HOSPADM

## 2022-06-10 RX ADMIN — SODIUM CHLORIDE 75 ML/HR: 9 INJECTION, SOLUTION INTRAVENOUS at 07:46

## 2022-06-10 NOTE — H&P
Hospital admission  Patient Name: Mikey Crawley  Age/Sex: 81 y.o. male  : 1940  MRN: [id number]    Date of Admission: 6/10/2022  Date of Encounter Visit: 06/10/22  Encounter Provider: Anthony Forde MD        Referring Provider: Anthony Forde MD  Patient Care Team:  Sotero Torres MD as PCP - General (Family Medicine)  Dima Alvarado MD as Consulting Physician (Orthopedic Surgery)  Jacinto Maldonado MD as Consulting Physician (Orthopedic Surgery)  Michelle Garcia MD as Consulting Physician (Cardiology)    Subjective:   Admitted for: Pacemaker replacement      History of Present Illness:  Mikey Crawley is a 81 y.o. male with sick sinus syndrome.  He has a permanent pacemaker that has now come to [facility].  He is here for replacement        Past Medical History:  Past Medical History:   Diagnosis Date   • Bradycardia    • Diverticulosis    • Hyperlipidemia    • Hypertension    • Lesion of vertebra    • Obstructive sleep apnea     cpap   • Osteoarthritis    • Polyp of sigmoid colon    • Sick sinus syndrome (HCC)    • Sinus bradycardia        Past Surgical History:   Procedure Laterality Date   • BACK SURGERY     • CATARACT EXTRACTION, BILATERAL      sep 2018   • CHOLECYSTECTOMY     • COLONOSCOPY     • EYE SURGERY  2018   • HEMORRHOIDECTOMY     • KNEE SURGERY     • PACEMAKER IMPLANTATION      dual chamber   • SHOULDER SURGERY      right 4-18   • SPINE SURGERY     • TONSILLECTOMY AND ADENOIDECTOMY     • VASECTOMY         Home Medications:   Medications Prior to Admission   Medication Sig Dispense Refill Last Dose   • amlodipine-olmesartan (AMAYA) 5-40 MG per tablet Take 1 tablet by mouth Daily. 90 tablet 3    • atenolol (TENORMIN) 50 MG tablet Take 1 tablet by mouth Daily. 90 tablet 3    • finasteride (PROSCAR) 5 MG tablet Take 1 tablet by mouth daily.      • hydroCHLOROthiazide (HYDRODIURIL) 12.5 MG tablet TAKE 1 TABLET DAILY 90 tablet 1    • rosuvastatin (CRESTOR) 5 MG tablet TAKE 1  TABLET THREE TIMES A WEEK (TIME FOR FOLLOW UP VISIT) 90 tablet 0    • sertraline (ZOLOFT) 50 MG tablet Take 1 tablet by mouth Daily. 90 tablet 3    • tamsulosin (FLOMAX) 0.4 MG capsule 24 hr capsule Take 1 capsule by mouth 2 (two) times a day.          Allergies:  No Known Allergies    Past Social History:  Social History     Socioeconomic History   • Marital status:    • Number of children: 3   • Years of education: 12   Tobacco Use   • Smoking status: Former Smoker     Packs/day: 0.00     Years: 1.00     Pack years: 0.00     Types: Cigars     Start date: 1980     Quit date: 1985     Years since quittin.0   • Smokeless tobacco: Never Used   • Tobacco comment: never smoked cigarettes only cigars now and then   Substance and Sexual Activity   • Alcohol use: Yes     Alcohol/week: 2.0 standard drinks     Types: 1 Cans of beer, 1 Shots of liquor per week     Comment: seldom daily caffine   • Drug use: No   • Sexual activity: Yes     Partners: Female     Birth control/protection: None        Past Family History:  Family History   Problem Relation Age of Onset   • Cancer Father    • Arthritis Father    • Vision loss Father    • Emphysema Mother    • Glaucoma Mother    • Arthritis Mother    • No Known Problems Maternal Grandmother    • No Known Problems Maternal Grandfather    • No Known Problems Paternal Grandmother    • No Known Problems Paternal Grandfather        Review of Systems: All systems reviewed. Pertinent positives identified in HPI. All other systems are negative.     REVIEW OF SYSTEMS:   CONSTITUTIONAL: No weight loss, fever, chills, weakness or fatigue.   HEENT: Eyes: No visual loss, blurred vision, double vision or yellow sclerae. Ears, Nose, Throat: No hearing loss, sneezing, congestion, runny nose or sore throat.   SKIN: No rash or itching.     RESPIRATORY: No shortness of breath, hemoptysis, cough or sputum.   GASTROINTESTINAL: No anorexia, nausea, vomiting or diarrhea. No abdominal  pain, bright red blood per rectum or melena.  GENITOURINARY: No burning on urination, hematuria or increased frequency.  NEUROLOGICAL: No headache, dizziness, syncope, paralysis, ataxia, numbness or tingling in the extremities. No change in bowel or bladder control.   MUSCULOSKELETAL: No muscle, back pain, joint pain or stiffness.   HEMATOLOGIC: No anemia, bleeding or bruising.   LYMPHATICS: No enlarged nodes. No history of splenectomy.   PSYCHIATRIC: No history of depression, anxiety, hallucinations.   ENDOCRINOLOGIC: No reports of sweating, cold or heat intolerance. No polyuria or polydipsia.       Objective:     Objective:     No intake or output data in the 24 hours ending 06/10/22 0732  There is no height or weight on file to calculate BMI.  There were no vitals filed for this visit.        Physical Exam:   Vitals reviewed.   Constitutional:       Appearance: Healthy appearance. Well-developed and not in distress.   HENT:      Head: Normocephalic.   Neck:      Thyroid: No thyromegaly.      Vascular: No carotid bruit or JVD.   Pulmonary:      Effort: Pulmonary effort is normal.      Breath sounds: Normal breath sounds.   Cardiovascular:      Normal rate. Regular rhythm. Normal S1. Normal S2.      Murmurs: There is no murmur.      No gallop.   Pulses:     Intact distal pulses.   Edema:     Peripheral edema absent.   Skin:     General: Skin is warm and dry.      Findings: No erythema.   Neurological:      Mental Status: Alert and oriented to person, place, and time.           Lab Review:     Results from last 7 days   Lab Units 06/08/22  1008   SODIUM mmol/L 143   POTASSIUM mmol/L 4.8   CHLORIDE mmol/L 105   CO2 mmol/L 27.0   BUN mg/dL 14   CREATININE mg/dL 1.15   GLUCOSE mg/dL 108*   CALCIUM mg/dL 9.2           Results from last 7 days   Lab Units 06/08/22  1008   WBC 10*3/mm3 6.20   HEMOGLOBIN g/dL 15.0   HEMATOCRIT % 44.6   PLATELETS 10*3/mm3 197                                   Imaging:      Imaging Results  (Most Recent)     None          EKG:         Assessment:   Assessment & Plan         Sick sinus syndrome (HCC)            Plan:     Pacemaker replacement    Thank you for allowing me to participate in the care of Mikey Crawley. Feel free to contact me directly with any further questions or concerns.    Anthony Forde MD  [facility] [place] [facility]  06/10/22  07:32 EDT

## 2022-06-10 NOTE — DISCHARGE INSTRUCTIONS
"[facility] - [phone]    BATTERY CHANGE FOR YOUR PACEMAKER  What is a pacemaker battery change?  Your pacemaker generator has been changed because your batteries were running low.     A pacemaker has two parts -- the \"generator\" that creates the electricity that helps keep your heart beating regularly and wires that carry the electricity from the generator to your heart muscle. When your batteries start running low, your doctor or nurse can tell when they check your pacemaker during your routine pacemaker checks. The pacemaker gives several months' warning, so there is plenty of time to get the battery changed and you don't need to worry about the pacemaker running out of energy!  When your battery runs low, the doctor replaces the whole generator part of the pacemaker. The wires that carry electricity from the generator to your heart muscle aren't changed; they are just be disconnected from the old generator and hooked up to the new generator.    Replacing the pacemaker is a short outpatient surgery that takes about an hour. The doctor may have given you some medicine to make you drowsy and numb the skin over your pacemaker so you didn't feel any pain during the surgery.     You will have a small dressing over your pacemaker. You will need a responsible adult to drive you home after your procedure.    At home, please follow these instructions:   Take your medicines as prescribed.   Check your pulse each day and record in a diary.   Check your incision each day until it is healed.   Check with your health care provider before lifting heavy objects.   Check with your health care provider before driving.   Carry your pacemaker card with you at all times.   Avoid strong magnetic fields such as an MRI.    Let airport personnel know that you have a pacemaker before you go through the metal detector.  The pacemaker clinic will contact you (usually within 1 business day) to schedule a " pacemaker/incision check. The check is usually done 7-10 days post-implant. If you have not heard from the pacemaker clinic within 3 days, please call the office.    Please call the office if you experience any of the following:   bleeding or drainage from your incision   swelling, redness, or opening of your incision   fever or chills   pain not relieved with medication   chest pain or difficulty breathing   lightheadness    Contact your health care provider as soon as possible if you have any other questions or concerns.

## 2022-06-21 ENCOUNTER — CLINICAL SUPPORT NO REQUIREMENTS (OUTPATIENT)
Dept: CARDIOLOGY | Facility: CLINIC | Age: 82
End: 2022-06-21

## 2022-06-21 DIAGNOSIS — I49.5 SICK SINUS SYNDROME: Primary | ICD-10-CM

## 2022-06-21 PROCEDURE — 93280 PM DEVICE PROGR EVAL DUAL: CPT | Performed by: INTERNAL MEDICINE

## 2022-07-01 ENCOUNTER — CLINICAL SUPPORT (OUTPATIENT)
Dept: FAMILY MEDICINE CLINIC | Facility: CLINIC | Age: 82
End: 2022-07-01

## 2022-07-01 DIAGNOSIS — Z23 NEED FOR VACCINATION: Primary | ICD-10-CM

## 2022-07-01 PROCEDURE — 91305 COVID-19 (PFIZER) 12+ YRS: CPT | Performed by: FAMILY MEDICINE

## 2022-07-01 PROCEDURE — 0051A COVID-19 (PFIZER) 12+ YRS: CPT | Performed by: FAMILY MEDICINE

## 2022-09-08 NOTE — TELEPHONE ENCOUNTER
Rx Refill Note  Requested Prescriptions     Pending Prescriptions Disp Refills   • rosuvastatin (CRESTOR) 5 MG tablet [Pharmacy Med Name: ROSUVASTATIN TABS 5MG] 90 tablet 3     Sig: TAKE 1 TABLET THREE TIMES A WEEK (TIME FOR FOLLOW UP VISIT)      Last office visit with prescribing clinician: Visit date not found      Next office visit with prescribing clinician: Visit date not found            Radha Sanchez MA  09/08/22, 15:31 EDT

## 2022-09-09 RX ORDER — ROSUVASTATIN CALCIUM 5 MG/1
TABLET, COATED ORAL
Qty: 90 TABLET | Refills: 3 | Status: SHIPPED | OUTPATIENT
Start: 2022-09-09

## 2022-09-22 RX ORDER — HYDROCHLOROTHIAZIDE 12.5 MG/1
TABLET ORAL
Qty: 90 TABLET | Refills: 1 | Status: SHIPPED | OUTPATIENT
Start: 2022-09-22

## 2022-09-22 NOTE — TELEPHONE ENCOUNTER
Rx Refill Note  Requested Prescriptions     Pending Prescriptions Disp Refills   • hydroCHLOROthiazide (HYDRODIURIL) 12.5 MG tablet [Pharmacy Med Name: HYDROCHLOROTHIAZIDE TABS 12.5MG] 90 tablet 1     Sig: TAKE 1 TABLET DAILY      Last office visit with prescribing clinician: 4/28/2022      Next office visit with prescribing clinician: 11/16/2022            Jyoti Guerin  09/22/22, 09:51 EDT

## 2022-10-24 NOTE — TELEPHONE ENCOUNTER
Rx Refill Note  Requested Prescriptions     Pending Prescriptions Disp Refills   • sertraline (ZOLOFT) 50 MG tablet [Pharmacy Med Name: SERTRALINE HCL TABS 50MG] 90 tablet 3     Sig: TAKE 1 TABLET DAILY      Last office visit with prescribing clinician: 4/28/2022      Next office visit with prescribing clinician: 11/16/2022       {TIP  Please add Last Relevant Lab Date if appropriate: 6/8/2022    APPLE Rangel  10/24/22, 14:27 EDT

## 2022-11-07 RX ORDER — ATENOLOL 50 MG/1
TABLET ORAL
Qty: 90 TABLET | Refills: 3 | Status: SHIPPED | OUTPATIENT
Start: 2022-11-07 | End: 2022-11-22 | Stop reason: SDUPTHER

## 2022-11-07 NOTE — TELEPHONE ENCOUNTER
Rx Refill Note  Requested Prescriptions     Pending Prescriptions Disp Refills    atenolol (TENORMIN) 50 MG tablet [Pharmacy Med Name: ATENOLOL TABS 50MG] 90 tablet 3     Sig: TAKE 1 TABLET DAILY      Last office visit with prescribing clinician: 11/3/2021      Next office visit with prescribing clinician: Visit date not found            Bere Lemus MA  11/07/22, 16:24 EST

## 2022-11-08 ENCOUNTER — OFFICE VISIT (OUTPATIENT)
Dept: CARDIOLOGY | Facility: CLINIC | Age: 82
End: 2022-11-08

## 2022-11-08 ENCOUNTER — TELEPHONE (OUTPATIENT)
Dept: CARDIOLOGY | Facility: CLINIC | Age: 82
End: 2022-11-08

## 2022-11-08 ENCOUNTER — CLINICAL SUPPORT NO REQUIREMENTS (OUTPATIENT)
Dept: CARDIOLOGY | Facility: CLINIC | Age: 82
End: 2022-11-08

## 2022-11-08 VITALS
WEIGHT: 205 LBS | HEIGHT: 67 IN | BODY MASS INDEX: 32.18 KG/M2 | SYSTOLIC BLOOD PRESSURE: 140 MMHG | DIASTOLIC BLOOD PRESSURE: 90 MMHG | HEART RATE: 63 BPM

## 2022-11-08 DIAGNOSIS — R06.09 DOE (DYSPNEA ON EXERTION): ICD-10-CM

## 2022-11-08 DIAGNOSIS — I10 ESSENTIAL HYPERTENSION: Primary | Chronic | ICD-10-CM

## 2022-11-08 DIAGNOSIS — I49.5 SICK SINUS SYNDROME: ICD-10-CM

## 2022-11-08 DIAGNOSIS — R53.83 OTHER FATIGUE: ICD-10-CM

## 2022-11-08 DIAGNOSIS — R42 DIZZINESS: Primary | ICD-10-CM

## 2022-11-08 DIAGNOSIS — I47.1 PAT (PAROXYSMAL ATRIAL TACHYCARDIA): ICD-10-CM

## 2022-11-08 DIAGNOSIS — I49.5 SICK SINUS SYNDROME: Primary | ICD-10-CM

## 2022-11-08 PROCEDURE — 99214 OFFICE O/P EST MOD 30 MIN: CPT | Performed by: NURSE PRACTITIONER

## 2022-11-08 PROCEDURE — 93000 ELECTROCARDIOGRAM COMPLETE: CPT | Performed by: NURSE PRACTITIONER

## 2022-11-08 PROCEDURE — 93280 PM DEVICE PROGR EVAL DUAL: CPT | Performed by: INTERNAL MEDICINE

## 2022-11-08 NOTE — PROGRESS NOTES
Date of Office Visit: 2022  Encounter Provider: Adelina Alvarez, MELYSSA, APRN  Place of Service: [facility] CARDIOLOGY  Patient Name: Mikey Crawley  :1940        Subjective:     Chief Complaint:  Hypertension, pacemaker      History of Present Illness:  Mikey Crawley is a 82 y.o. male patient of Dr. Garcia.  I am seeing this patient in the office today and I have reviewed his records.    Patient has a history of sick sinus syndrome status post permanent pacemaker placement, hypertension, chronic kidney disease, dizziness.     Patient has a history of sick sinus syndrome with pacemaker placement.  He has continued to be followed routinely with the office.  He transferred care to Dr. Garcia 2020 at which point he had a telehealth visit with her due to the coronavirus epidemic.  At this point he reported some lightheadedness with bending over and standing up quickly.  This had been going on for a while.  His chlorthalidone dose was decreased in half.  He was feeling well overall and seen in the office 2021 for office follow-up.  He was still having some lightheadedness with rapid position changes.  He was not orthostatic in the office.  He reported he was drinking barely any water.  He was recommended to increase water intake and if symptoms fail to improve we need to consider medication adjustments.  He was seen in the office 2021 by Dr. Garcia at which point 1 year office follow-up with pacemaker check was recommended.  He underwent battery change 2022 with Dr. Forde.        Patient presents to office today for follow-up appointment.  Patient reports he is doing okay overall since last visit.  He thought he noticed some improvement in his dizziness symptoms after he had pacemaker generator battery changed but improvement did not last long.  He continues to get occasional lightheadedness that can sometimes occur when he is up and active or other times when he is  rolling over in bed.  He reports his BP is not low, he is checked his BP when he has had symptoms and it may run 148/71, 150/67, 152/61.  He is not checking his blood pressure at other times.  He feels that he is more fatigued with activities since last visit and has been having some shortness of breath with exertion.  He is using CPAP nightly and due for his follow-up with sleep medicine will call to schedule this.  He appears euvolemic on exam and denies edema, palpitations, racing heartbeat sensation, syncope, near syncope, falls, or abnormal bleeding.  He sees PCP tomorrow he expects to get all labs checked.            Past Medical History:   Diagnosis Date   • Bradycardia    • Diverticulosis    • Hyperlipidemia    • Hypertension    • Lesion of vertebra    • Obstructive sleep apnea     cpap   • Osteoarthritis    • Polyp of sigmoid colon    • Sick sinus syndrome (HCC)    • Sinus bradycardia      Past Surgical History:   Procedure Laterality Date   • BACK SURGERY     • CARDIAC ELECTROPHYSIOLOGY PROCEDURE N/A 6/10/2022    Procedure: PPM generator change - dual  [employer];  Surgeon: Anthony Forde MD;  Location: [facility] INVASIVE LOCATION;  Service: Cardiology;  Laterality: N/A;   • CATARACT EXTRACTION, BILATERAL      sep 2018   • CHOLECYSTECTOMY     • COLONOSCOPY     • EYE SURGERY  Sept 2018   • HEMORRHOIDECTOMY     • KNEE SURGERY     • PACEMAKER IMPLANTATION      dual chamber   • SHOULDER SURGERY      right 4-18   • SPINE SURGERY     • TONSILLECTOMY AND ADENOIDECTOMY     • VASECTOMY       Outpatient Medications Prior to Visit   Medication Sig Dispense Refill   • atenolol (TENORMIN) 50 MG tablet TAKE 1 TABLET DAILY 90 tablet 3   • finasteride (PROSCAR) 5 MG tablet Take 1 tablet by mouth daily.     • hydroCHLOROthiazide (HYDRODIURIL) 12.5 MG tablet TAKE 1 TABLET DAILY 90 tablet 1   • rosuvastatin (CRESTOR) 5 MG tablet TAKE 1 TABLET THREE TIMES A WEEK (TIME FOR FOLLOW UP VISIT) 90 tablet 3   • sertraline  (ZOLOFT) 50 MG tablet TAKE 1 TABLET DAILY 90 tablet 0   • tamsulosin (FLOMAX) 0.4 MG capsule 24 hr capsule Take 1 capsule by mouth 2 (two) times a day.     • amlodipine-olmesartan (AMAYA) 5-40 MG per tablet Take 1 tablet by mouth Daily. 90 tablet 3   • oxyCODONE-acetaminophen (PERCOCET) 5-325 MG per tablet Take 1-2 tablets by mouth Every 4 (Four) Hours As Needed (Pain). 10 tablet 0     No facility-administered medications prior to visit.       Allergies as of 2022   • (No Known Allergies)     Social History     Socioeconomic History   • Marital status:    • Number of children: 3   • Years of education: 12   Tobacco Use   • Smoking status: Former     Packs/day: 0.00     Years: 1.00     Pack years: 0.00     Types: Cigars, Cigarettes     Start date: 1980     Quit date: 1985     Years since quittin.4   • Smokeless tobacco: Never   • Tobacco comments:     never smoked cigarettes only cigars now and then   Substance and Sexual Activity   • Alcohol use: Yes     Alcohol/week: 2.0 standard drinks     Types: 1 Cans of beer, 1 Shots of liquor per week     Comment: seldom, daily caffiene   • Drug use: No   • Sexual activity: Yes     Partners: Female     Birth control/protection: None     Family History   Problem Relation Age of Onset   • Cancer Father    • Arthritis Father    • Vision loss Father    • Emphysema Mother    • Glaucoma Mother    • Arthritis Mother    • No Known Problems Maternal Grandmother    • No Known Problems Maternal Grandfather    • No Known Problems Paternal Grandmother    • No Known Problems Paternal Grandfather        Review of Systems   Constitutional: Positive for malaise/fatigue.   Cardiovascular:        SEE HPI   Respiratory: Positive for shortness of breath.    Hematologic/Lymphatic: Negative for bleeding problem.   Musculoskeletal: Negative for falls.   Gastrointestinal: Negative for melena.   Genitourinary: Negative for hematuria.   Neurological: Positive for dizziness.  "  Psychiatric/Behavioral: Negative for altered mental status.          Objective:     Vitals:    11/08/22 1119   BP: 140/90   Pulse: 63   Weight: 93 kg (205 lb)   Height: 170.2 cm (67\")     Body mass index is 32.11 kg/m².      PHYSICAL EXAM:  Constitutional:       General: Not in acute distress.     Appearance: Well-developed. Not diaphoretic.   Eyes:      Pupils: Pupils are equal, round, and reactive to light.   HENT:      Head: Normocephalic and atraumatic.   Pulmonary:      Effort: Pulmonary effort is normal. No respiratory distress.      Breath sounds: Normal breath sounds. No wheezing. No rales.   Cardiovascular:      Normal rate. Regular rhythm.      Murmurs: There is no murmur.      No gallop. No click. No rub.   Edema:     Peripheral edema absent.   Abdominal:      General: Bowel sounds are normal.      Palpations: Abdomen is soft.   Musculoskeletal: Normal range of motion.         General: No tenderness or deformity. Skin:     General: Skin is warm and dry.      Findings: No erythema.   Neurological:      Mental Status: Alert and oriented to person, place, and time.   Psychiatric:         Behavior: Behavior normal.             ECG 12 Lead    Date/Time: 11/8/2022 11:35 AM  Performed by: Adelina Alvarez DNP, APRN  Authorized by: Adelina Alvarez DNP, APRN   Comparison: compared with previous ECG from 4/22/2021  Rhythm comments: Atrial paced rhythm  BPM: 63  Other findings: non-specific ST-T wave changes  Comments: No significant changes from previous EKGs              Assessment:       Diagnosis Plan   1. Essential hypertension        2. Sick sinus syndrome (HCC)        3. PAT (paroxysmal atrial tachycardia) (HCC)        4. SINGH (dyspnea on exertion)        5. Other fatigue              Plan:     1. Fatigue and dyspnea with exertion: He feels he has had worsening stamina with increased fatigue and shortness of breath with exertion.  We will check nuclear stress test to evaluate further as well as " echocardiogram.  2. Dizziness: This is a chronic intermittent issue for him.  Blood pressure is not low when he is having symptoms.  Symptoms sometimes occur when he is rolling over in bed and I wonder if he is having some vertigo.  Will check carotid ultrasound but also recommend discussing symptoms further with PCP and see if he would benefit from vestibular therapy if they do feel he has vertigo.  3. Hypertension: Only checking blood pressure recently when he is not feeling well.  Recommended checking blood pressure daily for the next week and calling with readings.  Could look at increasing atenolol dose if blood pressure remains elevated.  4. Sick sinus syndrome status post permanent pacemaker placement: Continue with routine device checks.  Looks like pacemaker department did make some changes today.  5. Atrial tachycardia: Magdalena 0.2%.  We will look at increasing atenolol, as above, if blood pressure elevated.  Avoid stimulants.  Follow-up with sleep medicine to ensure sleep apnea well treated.  Will discuss further with Dr. Garcia as well.  6. CKD: PCP following    Patient to follow-up with Dr. Garcia in 6 months or follow-up sooner if needed for any new, recurrent, or worsening symptoms or concerns.  Discussed in detail signs/symptoms that warrant sooner call or follow-up to the office or ER visit.           Your medication list          Accurate as of November 8, 2022 11:59 PM. If you have any questions, ask your nurse or doctor.            CONTINUE taking these medications      Instructions Last Dose Given Next Dose Due   amlodipine-olmesartan 5-40 MG per tablet  Commonly known as: AMAYA      Take 1 tablet by mouth Daily.       atenolol 50 MG tablet  Commonly known as: TENORMIN      TAKE 1 TABLET DAILY       finasteride 5 MG tablet  Commonly known as: PROSCAR      Take 1 tablet by mouth daily.       hydroCHLOROthiazide 12.5 MG tablet  Commonly known as: HYDRODIURIL      TAKE 1 TABLET DAILY       rosuvastatin 5  MG tablet  Commonly known as: CRESTOR      TAKE 1 TABLET THREE TIMES A WEEK (TIME FOR FOLLOW UP VISIT)       sertraline 50 MG tablet  Commonly known as: ZOLOFT      TAKE 1 TABLET DAILY       tamsulosin 0.4 MG capsule 24 hr capsule  Commonly known as: FLOMAX      Take 1 capsule by mouth 2 (two) times a day.              The above medication changes may not have been made by this provider.  Patient's medication list was updated to reflect medications they are currently taking including medication changes made by other providers.            Thanks,    Adelina Alvarez, DNP, APRN  11/08/2022         Dictated utilizing Dragon dictation

## 2022-11-08 NOTE — TELEPHONE ENCOUNTER
I saw the pt in office today for device testing of his MDT Ayah and he had mentioned some weakness and that he has had dizziness lately, after his check he had appt with AMOL LOERA. Upon review it seems prior to his gen change in June of this year he had had rate response on. I turned rate response back on today to see if that helped him. Mode from AAI-DDD to AAIR-DDDR. Additionally, he was having some FFRW so I turned his blanking settings from partial to partial plus. I just wanted to touch base and let you know of these changes.   Kindly,   Carolyn Lopez Device RN

## 2022-11-09 ENCOUNTER — TELEPHONE (OUTPATIENT)
Dept: FAMILY MEDICINE CLINIC | Facility: CLINIC | Age: 82
End: 2022-11-09

## 2022-11-09 DIAGNOSIS — E78.2 MIXED HYPERLIPIDEMIA: Primary | Chronic | ICD-10-CM

## 2022-11-09 DIAGNOSIS — I10 ESSENTIAL HYPERTENSION: Chronic | ICD-10-CM

## 2022-11-09 LAB
ALBUMIN SERPL-MCNC: 4.7 G/DL (ref 3.5–5.2)
ALBUMIN/GLOB SERPL: 1.6 G/DL
ALP SERPL-CCNC: 86 U/L (ref 39–117)
ALT SERPL-CCNC: 19 U/L (ref 1–41)
AST SERPL-CCNC: 24 U/L (ref 1–40)
BILIRUB SERPL-MCNC: 0.4 MG/DL (ref 0–1.2)
BUN SERPL-MCNC: 14 MG/DL (ref 8–23)
BUN/CREAT SERPL: 11.3 (ref 7–25)
CALCIUM SERPL-MCNC: 9.4 MG/DL (ref 8.6–10.5)
CHLORIDE SERPL-SCNC: 101 MMOL/L (ref 98–107)
CHOLEST SERPL-MCNC: 136 MG/DL (ref 0–200)
CO2 SERPL-SCNC: 32.1 MMOL/L (ref 22–29)
CREAT SERPL-MCNC: 1.24 MG/DL (ref 0.76–1.27)
EGFRCR SERPLBLD CKD-EPI 2021: 58 ML/MIN/1.73
GLOBULIN SER CALC-MCNC: 3 GM/DL
GLUCOSE SERPL-MCNC: 96 MG/DL (ref 65–99)
HDLC SERPL-MCNC: 49 MG/DL (ref 40–60)
LDLC SERPL CALC-MCNC: 70 MG/DL (ref 0–100)
POTASSIUM SERPL-SCNC: 4.5 MMOL/L (ref 3.5–5.2)
PROT SERPL-MCNC: 7.7 G/DL (ref 6–8.5)
SODIUM SERPL-SCNC: 143 MMOL/L (ref 136–145)
TRIGL SERPL-MCNC: 92 MG/DL (ref 0–150)
VLDLC SERPL CALC-MCNC: 17 MG/DL (ref 5–40)

## 2022-11-10 PROCEDURE — 93296 REM INTERROG EVL PM/IDS: CPT | Performed by: INTERNAL MEDICINE

## 2022-11-10 PROCEDURE — 93294 REM INTERROG EVL PM/LDLS PM: CPT | Performed by: INTERNAL MEDICINE

## 2022-11-14 PROBLEM — I47.19 PAT (PAROXYSMAL ATRIAL TACHYCARDIA): Status: ACTIVE | Noted: 2022-11-14

## 2022-11-14 PROBLEM — R53.83 OTHER FATIGUE: Status: ACTIVE | Noted: 2022-11-14

## 2022-11-14 PROBLEM — R06.09 DOE (DYSPNEA ON EXERTION): Status: ACTIVE | Noted: 2022-11-14

## 2022-11-14 PROBLEM — I47.1 PAT (PAROXYSMAL ATRIAL TACHYCARDIA) (HCC): Status: ACTIVE | Noted: 2022-11-14

## 2022-11-14 RX ORDER — AMLODIPINE AND OLMESARTAN MEDOXOMIL 5; 40 MG/1; MG/1
TABLET ORAL
Qty: 90 TABLET | Refills: 3 | Status: SHIPPED | OUTPATIENT
Start: 2022-11-14

## 2022-11-14 NOTE — TELEPHONE ENCOUNTER
Rx Refill Note  Requested Prescriptions     Pending Prescriptions Disp Refills    amlodipine-olmesartan (AMAYA) 5-40 MG per tablet [Pharmacy Med Name: AMLODIPINE/OLMESARTAN TABS 5/40MG] 90 tablet 3     Sig: TAKE 1 TABLET DAILY      Last office visit with prescribing clinician: 11/3/2021      Next office visit with prescribing clinician: Visit date not found            Bere Lemus MA  11/14/22, 11:45 EST

## 2022-11-16 ENCOUNTER — OFFICE VISIT (OUTPATIENT)
Dept: FAMILY MEDICINE CLINIC | Facility: CLINIC | Age: 82
End: 2022-11-16

## 2022-11-16 VITALS
OXYGEN SATURATION: 94 % | WEIGHT: 206.1 LBS | HEART RATE: 64 BPM | TEMPERATURE: 96.9 F | BODY MASS INDEX: 32.35 KG/M2 | SYSTOLIC BLOOD PRESSURE: 136 MMHG | HEIGHT: 67 IN | DIASTOLIC BLOOD PRESSURE: 68 MMHG

## 2022-11-16 DIAGNOSIS — Z00.00 MEDICARE ANNUAL WELLNESS VISIT, SUBSEQUENT: Primary | ICD-10-CM

## 2022-11-16 DIAGNOSIS — D17.1 LIPOMA OF TORSO: ICD-10-CM

## 2022-11-16 DIAGNOSIS — I10 ESSENTIAL HYPERTENSION: ICD-10-CM

## 2022-11-16 DIAGNOSIS — F33.42 RECURRENT MAJOR DEPRESSIVE DISORDER, IN FULL REMISSION: ICD-10-CM

## 2022-11-16 DIAGNOSIS — R53.83 OTHER FATIGUE: ICD-10-CM

## 2022-11-16 DIAGNOSIS — E78.2 MIXED HYPERLIPIDEMIA: ICD-10-CM

## 2022-11-16 PROCEDURE — 1170F FXNL STATUS ASSESSED: CPT | Performed by: FAMILY MEDICINE

## 2022-11-16 PROCEDURE — 0124A PR ADM SARSCOV2 30MCG/0.3ML BST: CPT | Performed by: FAMILY MEDICINE

## 2022-11-16 PROCEDURE — G0008 ADMIN INFLUENZA VIRUS VAC: HCPCS | Performed by: FAMILY MEDICINE

## 2022-11-16 PROCEDURE — 99213 OFFICE O/P EST LOW 20 MIN: CPT | Performed by: FAMILY MEDICINE

## 2022-11-16 PROCEDURE — 1160F RVW MEDS BY RX/DR IN RCRD: CPT | Performed by: FAMILY MEDICINE

## 2022-11-16 PROCEDURE — 91312 COVID-19 (PFIZER) BIVALENT BOOSTER 12+YRS: CPT | Performed by: FAMILY MEDICINE

## 2022-11-16 PROCEDURE — 90662 IIV NO PRSV INCREASED AG IM: CPT | Performed by: FAMILY MEDICINE

## 2022-11-16 PROCEDURE — G0439 PPPS, SUBSEQ VISIT: HCPCS | Performed by: FAMILY MEDICINE

## 2022-11-16 NOTE — PROGRESS NOTES
"Chief Complaint  Medicare Wellness-subsequent    Subjective        Mikey Crawley presents to [facility] PRIMARY [facility]ARE  History of Present Illness     Follow-up hypertension, hyperlipidemia, pacemaker, depression in remission, and osteoarthritis of multiple joints specially the hands and hips.  I saw him a few months ago.  Negative rheumatoid arthritis work-up.  Normal sed rate.  Normal CRP.  He complains of ongoing fatigue.  The cardiologist is concerned he may be cardiac oriented.  However he does not have chest pain or chest pressure or exertional symptoms at this time.  His depression has been stable and he continues on sertraline.  For his hypertension he continues amlodipine on losartan, atenolol, and hydrochlorothiazide.  For his hyperlipidemia he continues rosuvastatin.  I reviewed his lab work.  No significant abnormalities.  His GFR is just below 60.  But creatinine is normal.  Thyroid test normal a few months ago.    He is concerned about some lumps on his chest.  He is had a sebaceous cyst on the left side of the chest he states has been there for some time.  He has a new nodule on the right chest.  He states it is definitely new and came up in the last number of months.  He states his father had a type of sebaceous cyst skin cancer.  He will be seeing a dermatologist soon for spot on his forehead.    Objective   Vital Signs:  /68   Pulse 64   Temp 96.9 °F (36.1 °C) (Temporal)   Ht 170.2 cm (67\")   Wt 93.5 kg (206 lb 1.6 oz)   SpO2 94%   BMI 32.28 kg/m²   Estimated body mass index is 32.28 kg/m² as calculated from the following:    Height as of this encounter: 170.2 cm (67\").    Weight as of this encounter: 93.5 kg (206 lb 1.6 oz).          Physical Exam  Constitutional:       Appearance: Normal appearance.   HENT:      Head: Atraumatic.      Mouth/Throat:      Pharynx: Oropharynx is clear. No oropharyngeal exudate or posterior oropharyngeal erythema.   Eyes:      " Conjunctiva/sclera: Conjunctivae normal.   Neck:      Thyroid: No thyroid mass, thyromegaly or thyroid tenderness.   Cardiovascular:      Rate and Rhythm: Normal rate and regular rhythm.      Pulses: Normal pulses.      Heart sounds: Normal heart sounds.   Pulmonary:      Effort: Pulmonary effort is normal.      Breath sounds: Normal breath sounds.   Abdominal:      General: Abdomen is flat. There is no distension.      Palpations: Abdomen is soft. There is no mass.      Tenderness: There is no abdominal tenderness.      Hernia: No hernia is present.   Musculoskeletal:         General: Tenderness and deformity present. Normal range of motion.      Cervical back: Normal range of motion and neck supple. No muscular tenderness.      Comments: Osteoarthritis changes of the fingers.   Lymphadenopathy:      Cervical: No cervical adenopathy.   Skin:     General: Skin is warm and dry.      Findings: No rash.      Comments: On the left upper chest wall there is a freely movable nontender noninflamed sebaceous cyst about 2 and half centimeters in size.  On the right chest wall anterior to the pectoralis muscle and away from the breast there is a rubbery 3 to 4 cm subdermal nodule that is freely movable and nontender.  No skin changes.   Neurological:      General: No focal deficit present.      Mental Status: He is alert and oriented to person, place, and time.   Psychiatric:         Mood and Affect: Mood normal.        Result Review :                Assessment and Plan   Diagnoses and all orders for this visit:    1. Medicare annual wellness visit, subsequent (Primary)    2. Essential hypertension    3. Mixed hyperlipidemia    4. Recurrent major depressive disorder, in full remission (HCC)    5. Lipoma of torso  -     Ambulatory Referral to General Surgery    6. Other fatigue    Other orders  -     COVID-19 Bivalent Booster (Pfizer) 12+yrs  -     Fluzone High-Dose 65+yrs      Fatigue.  Likely multifactorial.  I agree with  the cardiologist, it would be reasonable to rule out symptomatic coronary disease.  Stress test pending.  I will see him back in 3 months for recheck.  Checking labs again prior including thyroid.    Depression.  Appears in full remission.  He continues maintenance sertraline.  Likely not a factor in his cheek symptoms.    Very probable lipoma right chest wall.  However it is new.  Patient has not lost weight.  I am recommending surgical consultation.  May need to be removed.  Also father with reported sebaceous adenocarcinoma    Hypertension, hyperlipidemia, depression all stable.         Follow Up   No follow-ups on file.  Patient was given instructions and counseling regarding his condition or for health maintenance advice. Please see specific information pulled into the AVS if appropriate.

## 2022-11-16 NOTE — PROGRESS NOTES
The ABCs of the Annual Wellness Visit  Subsequent Medicare Wellness Visit    Chief Complaint   Patient presents with   • Medicare Wellness-subsequent      Subjective    History of Present Illness:  Mikey Crawley is a 82 y.o. male who presents for a Subsequent Medicare Wellness Visit.    The following portions of the patient's history were reviewed and   updated as appropriate: allergies, current medications, past family history, past medical history, past social history, past surgical history and problem list.    Compared to one year ago, the patient feels his physical   health is worse.    Compared to one year ago, the patient feels his mental   health is the same.    Recent Hospitalizations:  He was not admitted to the hospital during the last year.       Current Medical Providers:  Patient Care Team:  Sotero Torres MD as PCP - General (Family Medicine)  Dima Alvarado MD as Consulting Physician (Orthopedic Surgery)  Jacinto Maldonado MD as Consulting Physician (Orthopedic Surgery)  Michelle Garcia MD as Consulting Physician (Cardiology)    Outpatient Medications Prior to Visit   Medication Sig Dispense Refill   • amlodipine-olmesartan (AMAYA) 5-40 MG per tablet TAKE 1 TABLET DAILY 90 tablet 3   • atenolol (TENORMIN) 50 MG tablet TAKE 1 TABLET DAILY 90 tablet 3   • finasteride (PROSCAR) 5 MG tablet Take 1 tablet by mouth daily.     • hydroCHLOROthiazide (HYDRODIURIL) 12.5 MG tablet TAKE 1 TABLET DAILY 90 tablet 1   • rosuvastatin (CRESTOR) 5 MG tablet TAKE 1 TABLET THREE TIMES A WEEK (TIME FOR FOLLOW UP VISIT) 90 tablet 3   • sertraline (ZOLOFT) 50 MG tablet TAKE 1 TABLET DAILY 90 tablet 0   • tamsulosin (FLOMAX) 0.4 MG capsule 24 hr capsule Take 1 capsule by mouth 2 (two) times a day.       No facility-administered medications prior to visit.       No opioid medication identified on active medication list. I have reviewed chart for other potential  high risk medication/s and harmful drug interactions in  "the elderly.          Aspirin is not on active medication list.  Aspirin use is not indicated based on review of current medical condition/s. Risk of harm outweighs potential benefits.  .    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  ACP discussion was held with the patient during this visit. Patient has an advance directive (not in EMR), copy requested.          Objective    Vitals:    11/16/22 1004   BP: 136/68   Pulse: 64   Temp: 96.9 °F (36.1 °C)   TempSrc: Temporal   SpO2: 94%   Weight: 93.5 kg (206 lb 1.6 oz)   Height: 170.2 cm (67\")     Estimated body mass index is 32.28 kg/m² as calculated from the following:    Height as of this encounter: 170.2 cm (67\").    Weight as of this encounter: 93.5 kg (206 lb 1.6 oz).    BMI is >= 30 and <35. (Class 1 Obesity). The following options were offered after discussion;: exercise counseling/recommendations      Does the patient have evidence of cognitive impairment? No    Physical Exam  Lab Results   Component Value Date    CHLPL 136 11/09/2022    TRIG 92 11/09/2022    HDL 49 11/09/2022    LDL 70 11/09/2022    VLDL 17 11/09/2022            HEALTH RISK ASSESSMENT    Smoking Status:  Social History     Tobacco Use   Smoking Status Former   • Types: Cigars   Smokeless Tobacco Never   Tobacco Comments    never smoked cigarets only cigars now and then     Alcohol Consumption:  Social History     Substance and " Sexual Activity   Alcohol Use Yes    Comment: seldom drink     Fall Risk Screen:    LORNA Fall Risk Assessment was completed, and patient is at LOW risk for falls.Assessment completed on:11/16/2022    Depression Screening:  PHQ-2/PHQ-9 Depression Screening 10/28/2021   Retired PHQ-9 Total Score 0   Retired Total Score 0       Health Habits and Functional and Cognitive Screening:  Functional & Cognitive Status 11/16/2022   Do you have difficulty preparing food and eating? No   Do you have difficulty bathing yourself, getting dressed or grooming yourself? No   Do you have difficulty using the toilet? No   Do you have difficulty moving around from place to place? No   Do you have trouble with steps or getting out of a bed or a chair? Yes   Current Diet Well Balanced Diet   Dental Exam Up to date   Eye Exam Up to date   Exercise (times per week) 0 times per week   Current Exercises Include No Regular Exercise   Current Exercise Activities Include -   Do you need help using the phone?  No   Are you deaf or do you have serious difficulty hearing?  Yes   Do you need help with transportation? No   Do you need help shopping? No   Do you need help preparing meals?  No   Do you need help with housework?  No   Do you need help with laundry? No   Do you need help taking your medications? No   Do you need help managing money? No   Do you ever drive or ride in a car without wearing a seat belt? No   Have you felt unusual stress, anger or loneliness in the last month? No   Who do you live with? Spouse   If you need help, do you have trouble finding someone available to you? No   Have you been bothered in the last four weeks by sexual problems? No   Do you have difficulty concentrating, remembering or making decisions? No       Age-appropriate Screening Schedule:  Refer to the list below for future screening recommendations based on patient's age, sex and/or medical conditions. Orders for these recommended tests are listed in the  plan section. The patient has been provided with a written plan.    Health Maintenance   Topic Date Due   • ZOSTER VACCINE (2 of 3) 01/28/2014   • INFLUENZA VACCINE  08/01/2022   • LIPID PANEL  11/09/2023   • TDAP/TD VACCINES (2 - Td or Tdap) 09/28/2028              Assessment & Plan   CMS Preventative Services Quick Reference  Risk Factors Identified During Encounter  Immunizations Discussed/Encouraged (specific Immunizations; Influenza and COVID19  The above risks/problems have been discussed with the patient.  Follow up actions/plans if indicated are seen below in the Assessment/Plan Section.  Pertinent information has been shared with the patient in the After Visit Summary.    Diagnoses and all orders for this visit:    1. Medicare annual wellness visit, subsequent (Primary)    2. Essential hypertension    3. Mixed hyperlipidemia    4. Recurrent major depressive disorder, in full remission (HCC)        Follow Up:   No follow-ups on file.     An After Visit Summary and PPPS were made available to the patient.

## 2022-11-16 NOTE — PATIENT INSTRUCTIONS
We spoke today about your osteoarthritis of multiple joints, especially hands and hips.  I would recommend taking acetaminophen/Tylenol 500 mg, 2 tablets twice a day every day.  In addition for more severe days, you can take over-the-counter ibuprofen or Aleve.  But I do not recommend taking anti-inflammatory pain medicine daily.  Tylenol will be best.    With regards to the lump on your right chest, it is probably a lipoma.  But is been getting bigger you believe.  I recommend that a general surgeon see you.  I will place a referral through [facility] and someone should contact you.  You can also talk about the sebaceous cyst on your left chest then also.    With regards to your fatigue, keep the upcoming visit with your cardiologist.  But if that test is normal, and you still feel bad, please see me again.

## 2022-11-21 ENCOUNTER — HOSPITAL ENCOUNTER (OUTPATIENT)
Dept: CARDIOLOGY | Facility: HOSPITAL | Age: 82
Discharge: HOME | End: 2022-11-21

## 2022-11-21 VITALS — WEIGHT: 209 LBS | HEIGHT: 67 IN | BODY MASS INDEX: 32.8 KG/M2

## 2022-11-21 DIAGNOSIS — R06.09 DOE (DYSPNEA ON EXERTION): ICD-10-CM

## 2022-11-21 DIAGNOSIS — R42 DIZZINESS: ICD-10-CM

## 2022-11-21 LAB
BH CV NUCLEAR PRIOR STUDY: 2
BH CV REST NUCLEAR ISOTOPE DOSE: 10.6 MCI
BH CV STRESS BP STAGE 1: NORMAL
BH CV STRESS COMMENTS STAGE 1: NORMAL
BH CV STRESS DOSE REGADENOSON STAGE 1: 0.4
BH CV STRESS DURATION MIN STAGE 1: 0
BH CV STRESS DURATION SEC STAGE 1: 10
BH CV STRESS HR STAGE 1: 63
BH CV STRESS NUCLEAR ISOTOPE DOSE: 35.9 MCI
BH CV STRESS PROTOCOL 1: NORMAL
BH CV STRESS RECOVERY BP: NORMAL MMHG
BH CV STRESS RECOVERY HR: 60 BPM
BH CV STRESS STAGE 1: 1
BH CV XLRA MEAS LEFT DIST CCA EDV: 21.7 CM/SEC
BH CV XLRA MEAS LEFT DIST CCA PSV: 70.8 CM/SEC
BH CV XLRA MEAS LEFT DIST ICA EDV: -19.2 CM/SEC
BH CV XLRA MEAS LEFT DIST ICA PSV: -60.4 CM/SEC
BH CV XLRA MEAS LEFT ICA/CCA RATIO: -0.98
BH CV XLRA MEAS LEFT MID CCA EDV: 22.4 CM/SEC
BH CV XLRA MEAS LEFT MID CCA PSV: 75 CM/SEC
BH CV XLRA MEAS LEFT MID ICA EDV: -12.6 CM/SEC
BH CV XLRA MEAS LEFT MID ICA PSV: -46.6 CM/SEC
BH CV XLRA MEAS LEFT PROX CCA EDV: 14.7 CM/SEC
BH CV XLRA MEAS LEFT PROX CCA PSV: 60.2 CM/SEC
BH CV XLRA MEAS LEFT PROX ECA PSV: -63.1 CM/SEC
BH CV XLRA MEAS LEFT PROX ICA EDV: -21.4 CM/SEC
BH CV XLRA MEAS LEFT PROX ICA PSV: -69.7 CM/SEC
BH CV XLRA MEAS LEFT PROX SCLA PSV: 65.4 CM/SEC
BH CV XLRA MEAS LEFT VERTEBRAL A PSV: -48.1 CM/SEC
BH CV XLRA MEAS RIGHT DIST CCA EDV: 14.3 CM/SEC
BH CV XLRA MEAS RIGHT DIST CCA PSV: 57.8 CM/SEC
BH CV XLRA MEAS RIGHT DIST ICA EDV: -19.4 CM/SEC
BH CV XLRA MEAS RIGHT DIST ICA PSV: -56.4 CM/SEC
BH CV XLRA MEAS RIGHT ICA/CCA RATIO: -1.03
BH CV XLRA MEAS RIGHT MID CCA EDV: 16.8 CM/SEC
BH CV XLRA MEAS RIGHT MID CCA PSV: 65.9 CM/SEC
BH CV XLRA MEAS RIGHT MID ICA EDV: -21 CM/SEC
BH CV XLRA MEAS RIGHT MID ICA PSV: -59.5 CM/SEC
BH CV XLRA MEAS RIGHT PROX CCA EDV: 21.7 CM/SEC
BH CV XLRA MEAS RIGHT PROX CCA PSV: 78.3 CM/SEC
BH CV XLRA MEAS RIGHT PROX ECA PSV: -63.6 CM/SEC
BH CV XLRA MEAS RIGHT PROX ICA EDV: -13.3 CM/SEC
BH CV XLRA MEAS RIGHT PROX ICA PSV: -40.3 CM/SEC
BH CV XLRA MEAS RIGHT PROX SCLA PSV: 59.5 CM/SEC
BH CV XLRA MEAS RIGHT VERTEBRAL A PSV: -32.2 CM/SEC
LV EF NUC BP: 54 %
MAXIMAL PREDICTED HEART RATE: 138 BPM
MAXIMAL PREDICTED HEART RATE: 138 BPM
PERCENT MAX PREDICTED HR: 45.65 %
STRESS BASELINE BP: NORMAL MMHG
STRESS BASELINE HR: 66 BPM
STRESS PERCENT HR: 54 %
STRESS POST EXERCISE DUR SEC: 10 SEC
STRESS POST PEAK BP: NORMAL MMHG
STRESS POST PEAK HR: 63 BPM
STRESS TARGET HR: 117 BPM
STRESS TARGET HR: 117 BPM

## 2022-11-21 PROCEDURE — 93880 EXTRACRANIAL BILAT STUDY: CPT

## 2022-11-21 PROCEDURE — 25010000002 REGADENOSON 0.4 MG/5ML SOLUTION: Performed by: NURSE PRACTITIONER

## 2022-11-21 PROCEDURE — 78452 HT MUSCLE IMAGE SPECT MULT: CPT | Performed by: INTERNAL MEDICINE

## 2022-11-21 PROCEDURE — A9502 TC99M TETROFOSMIN: HCPCS | Performed by: NURSE PRACTITIONER

## 2022-11-21 PROCEDURE — 93017 CV STRESS TEST TRACING ONLY: CPT

## 2022-11-21 PROCEDURE — 93880 EXTRACRANIAL BILAT STUDY: CPT | Performed by: INTERNAL MEDICINE

## 2022-11-21 PROCEDURE — 78452 HT MUSCLE IMAGE SPECT MULT: CPT

## 2022-11-21 PROCEDURE — 93016 CV STRESS TEST SUPVJ ONLY: CPT | Performed by: INTERNAL MEDICINE

## 2022-11-21 PROCEDURE — 93018 CV STRESS TEST I&R ONLY: CPT | Performed by: INTERNAL MEDICINE

## 2022-11-21 PROCEDURE — 0 TECHNETIUM TETROFOSMIN KIT: Performed by: NURSE PRACTITIONER

## 2022-11-21 RX ADMIN — TETROFOSMIN 1 DOSE: 1.38 INJECTION, POWDER, LYOPHILIZED, FOR SOLUTION INTRAVENOUS at 11:37

## 2022-11-21 RX ADMIN — REGADENOSON 0.4 MG: 0.08 INJECTION, SOLUTION INTRAVENOUS at 12:12

## 2022-11-21 RX ADMIN — TETROFOSMIN 1 DOSE: 1.38 INJECTION, POWDER, LYOPHILIZED, FOR SOLUTION INTRAVENOUS at 12:12

## 2022-11-22 ENCOUNTER — TELEPHONE (OUTPATIENT)
Dept: CARDIOLOGY | Facility: CLINIC | Age: 82
End: 2022-11-22

## 2022-11-22 RX ORDER — ATENOLOL 50 MG/1
TABLET ORAL
Qty: 45 TABLET | Refills: 0 | Status: SHIPPED | OUTPATIENT
Start: 2022-11-22

## 2022-11-22 NOTE — PROGRESS NOTES
Please let patient know that his stress test did not show evidence of tightly blocked arteries in the heart, considered low risk for tight heart blockages.  Carotid ultrasound showed some minimal plaque deposits to both carotid arteries but no significant blockages.  Recent lipid panel through PCP well-controlled.  Would continue statin as prescribed by PCP.    I do not see that he got his echocardiogram done?  Would have him call to schedule this if it was not done for some reason.    Would recommend easing into a light walking or exercise bike routine and slowly increasing as tolerated.  Call if symptoms worsen or fail to improve.  Would still like to check echo as well though, as above.

## 2022-11-22 NOTE — TELEPHONE ENCOUNTER
Please let patient know that I received his updated blood pressure readings and they are too high.  Would have him take the 50 mg of atenolol at bedtime and take 25 mg in the morning.  Would make sure he is checking heart rate and blood pressure at least an hour or 2 after morning medications and after sitting a good 5 to 10 minutes calmly, keep a log, and call with some updated readings in 1 week or send via [employer] in 1 week.

## 2022-11-22 NOTE — TELEPHONE ENCOUNTER
Spoke with patient, he verbalized understanding. He stated he will call with BP readings in 1 week after taking the atenolol 50mg at bedtime and 25mg in the morning.  // AW

## 2022-12-14 ENCOUNTER — PREP FOR SURGERY (OUTPATIENT)
Dept: OTHER | Facility: HOSPITAL | Age: 82
End: 2022-12-14

## 2022-12-14 ENCOUNTER — OFFICE VISIT (OUTPATIENT)
Dept: SURGERY | Facility: CLINIC | Age: 82
End: 2022-12-14
Payer: MEDICARE

## 2022-12-14 VITALS — WEIGHT: 205.4 LBS | BODY MASS INDEX: 32.24 KG/M2 | HEIGHT: 67 IN

## 2022-12-14 DIAGNOSIS — R22.9 SUBCUTANEOUS NODULES: Primary | ICD-10-CM

## 2022-12-14 DIAGNOSIS — R22.2 MASS OF CHEST: Primary | ICD-10-CM

## 2022-12-14 PROCEDURE — 99203 OFFICE O/P NEW LOW 30 MIN: CPT | Performed by: STUDENT IN AN ORGANIZED HEALTH CARE EDUCATION/TRAINING PROGRAM

## 2022-12-14 RX ORDER — CEFAZOLIN SODIUM 2 G/100ML
2 INJECTION, SOLUTION INTRAVENOUS ONCE
Status: CANCELLED | OUTPATIENT
Start: 2023-01-12 | End: 2022-12-14

## 2022-12-14 NOTE — PROGRESS NOTES
General Surgery History and Physical      Summary:    Mikey Crawley is a 82 y.o. gentleman with 2 enlarging subcutaneous masses of the chest wall.  Discussed excision in the operating room.  All risks (including bleeding, infection, damage to surrounding structures, recurrence), benefits, and alternatives were explained the patient who agreed with proceed    Referring Provider: Sotero Torres MD    Chief Complaint:    Right chest nodule    History of Present Illness:    Mikey Crawley is a 82 y.o. gentleman who presents to the office with 2 enlarging chest wall nodules.  These do not drained or been infected.  They are increasing in size and causing mild discomfort.  He has no prior similar events.    Past Medical History:   • BPH  • HTN  • HLD  • FITO on CPAP   • Sick sinus sundrome     Past Surgical History:    • Back surgery  • Bilateral cataract extraction  • Cholecystectomy   • Hemorrhoidectomy   • Pacemaker implantation   • Shoulder surgery   • Tonsillectomy/adenoidectomy  • Vasectomy    Family History:    Family History   Problem Relation Age of Onset   • Cancer Father    • Arthritis Father    • Vision loss Father    • Emphysema Mother    • Glaucoma Mother    • Arthritis Mother    • No Known Problems Maternal Grandmother    • No Known Problems Maternal Grandfather    • No Known Problems Paternal Grandmother    • No Known Problems Paternal Grandfather      Social History:    Denies tobacco use  • Occasional alcohol use    Allergies:   No Known Allergies    Medications:     Current Outpatient Medications:   •  amlodipine-olmesartan (AMAYA) 5-40 MG per tablet, TAKE 1 TABLET DAILY, Disp: 90 tablet, Rfl: 3  •  atenolol (TENORMIN) 50 MG tablet, Take one half of a tablet in the a.m. and 1 full tablet in the p.m., Disp: 45 tablet, Rfl: 0  •  finasteride (PROSCAR) 5 MG tablet, Take 1 tablet by mouth daily., Disp: , Rfl:   •  hydroCHLOROthiazide (HYDRODIURIL) 12.5 MG tablet, TAKE 1 TABLET DAILY, Disp: 90 tablet,  Rfl: 1  •  rosuvastatin (CRESTOR) 5 MG tablet, TAKE 1 TABLET THREE TIMES A WEEK (TIME FOR FOLLOW UP VISIT), Disp: 90 tablet, Rfl: 3  •  sertraline (ZOLOFT) 50 MG tablet, TAKE 1 TABLET DAILY, Disp: 90 tablet, Rfl: 0  •  tamsulosin (FLOMAX) 0.4 MG capsule 24 hr capsule, Take 1 capsule by mouth 2 (two) times a day., Disp: , Rfl:     Radiology/Endoscopy:    • No recent imaging    Labs:    • Labs from 11/2022 reviewed    Review of Systems:   Influenza-like illness: no fever, no  cough, no  sore throat, no  body aches, no loss of sense of taste or smell, no known exposure to person with Covid-19.  Constitutional: Negative for fevers or chills  HENT: Negative for hearing loss or runny nose  Eyes: Negative for vision changes or scleral icterus  Respiratory: Negative for cough or shortness of breath  Cardiovascular: Negative for chest pain or heart palpitations  Gastrointestinal: Negative for abdominal pain, nausea, vomiting, constipation, melena, or hematochezia  Genitourinary: Negative for hematuria or dysuria  Musculoskeletal: Negative for joint swelling or gait instability  Neurologic: Negative for tremors or seizures  Psychiatric: Negative for suicidal ideations or depression  All other systems reviewed and negative    Physical Exam:   • Constitutional: Well-developed well-nourished, no acute distress  • Eyes: Conjunctiva normal, sclera nonicteric  • ENMT: Hearing grossly normal, oral mucosa moist  • Neck: Supple, no palpable mass, trachea midline  • Respiratory: Clear to auscultation, normal inspiratory effort  • Cardiovascular: Regular rate, no peripheral edema, no jugular venous distention  • Gastrointestinal: Soft, nontender  • Skin:  Warm, dry, no rash on visualized skin surfaces; two mobile 1-2cm chest nodules on the right chest wall  • Musculoskeletal: Symmetric strength, normal gait  • Psychiatric: Alert and oriented ×3, normal affect       Rebecca Lofton M.D.  General and Endoscopic Surgery  [facility]    [address], [address]  [place], [place], [address]  P: [phone]  F: [phone]

## 2022-12-14 NOTE — H&P (VIEW-ONLY)
General Surgery History and Physical      Summary:    Mikey Crawley is a 82 y.o. gentleman with 2 enlarging subcutaneous masses of the chest wall.  Discussed excision in the operating room.  All risks (including bleeding, infection, damage to surrounding structures, recurrence), benefits, and alternatives were explained the patient who agreed with proceed    Referring Provider: Sotero Torres MD    Chief Complaint:    Right chest nodule    History of Present Illness:    Mikey Crawley is a 82 y.o. gentleman who presents to the office with 2 enlarging chest wall nodules.  These do not drained or been infected.  They are increasing in size and causing mild discomfort.  He has no prior similar events.    Past Medical History:   • BPH  • HTN  • HLD  • FITO on CPAP   • Sick sinus sundrome     Past Surgical History:    • Back surgery  • Bilateral cataract extraction  • Cholecystectomy   • Hemorrhoidectomy   • Pacemaker implantation   • Shoulder surgery   • Tonsillectomy/adenoidectomy  • Vasectomy    Family History:    Family History   Problem Relation Age of Onset   • Cancer Father    • Arthritis Father    • Vision loss Father    • Emphysema Mother    • Glaucoma Mother    • Arthritis Mother    • No Known Problems Maternal Grandmother    • No Known Problems Maternal Grandfather    • No Known Problems Paternal Grandmother    • No Known Problems Paternal Grandfather      Social History:    Denies tobacco use  • Occasional alcohol use    Allergies:   No Known Allergies    Medications:     Current Outpatient Medications:   •  amlodipine-olmesartan (AMAYA) 5-40 MG per tablet, TAKE 1 TABLET DAILY, Disp: 90 tablet, Rfl: 3  •  atenolol (TENORMIN) 50 MG tablet, Take one half of a tablet in the a.m. and 1 full tablet in the p.m., Disp: 45 tablet, Rfl: 0  •  finasteride (PROSCAR) 5 MG tablet, Take 1 tablet by mouth daily., Disp: , Rfl:   •  hydroCHLOROthiazide (HYDRODIURIL) 12.5 MG tablet, TAKE 1 TABLET DAILY, Disp: 90 tablet,  Rfl: 1  •  rosuvastatin (CRESTOR) 5 MG tablet, TAKE 1 TABLET THREE TIMES A WEEK (TIME FOR FOLLOW UP VISIT), Disp: 90 tablet, Rfl: 3  •  sertraline (ZOLOFT) 50 MG tablet, TAKE 1 TABLET DAILY, Disp: 90 tablet, Rfl: 0  •  tamsulosin (FLOMAX) 0.4 MG capsule 24 hr capsule, Take 1 capsule by mouth 2 (two) times a day., Disp: , Rfl:     Radiology/Endoscopy:    • No recent imaging    Labs:    • Labs from 11/2022 reviewed    Review of Systems:   Influenza-like illness: no fever, no  cough, no  sore throat, no  body aches, no loss of sense of taste or smell, no known exposure to person with Covid-19.  Constitutional: Negative for fevers or chills  HENT: Negative for hearing loss or runny nose  Eyes: Negative for vision changes or scleral icterus  Respiratory: Negative for cough or shortness of breath  Cardiovascular: Negative for chest pain or heart palpitations  Gastrointestinal: Negative for abdominal pain, nausea, vomiting, constipation, melena, or hematochezia  Genitourinary: Negative for hematuria or dysuria  Musculoskeletal: Negative for joint swelling or gait instability  Neurologic: Negative for tremors or seizures  Psychiatric: Negative for suicidal ideations or depression  All other systems reviewed and negative    Physical Exam:   • Constitutional: Well-developed well-nourished, no acute distress  • Eyes: Conjunctiva normal, sclera nonicteric  • ENMT: Hearing grossly normal, oral mucosa moist  • Neck: Supple, no palpable mass, trachea midline  • Respiratory: Clear to auscultation, normal inspiratory effort  • Cardiovascular: Regular rate, no peripheral edema, no jugular venous distention  • Gastrointestinal: Soft, nontender  • Skin:  Warm, dry, no rash on visualized skin surfaces; two mobile 1-2cm chest nodules on the right chest wall  • Musculoskeletal: Symmetric strength, normal gait  • Psychiatric: Alert and oriented ×3, normal affect       Rebecca Lofton M.D.  General and Endoscopic Surgery  [facility]    [address], [address]  [place], [place], [address]  P: [phone]  F: [phone]

## 2022-12-15 ENCOUNTER — HOSPITAL ENCOUNTER (OUTPATIENT)
Dept: CARDIOLOGY | Facility: HOSPITAL | Age: 82
Discharge: HOME | End: 2022-12-15
Admitting: NURSE PRACTITIONER

## 2022-12-15 VITALS
DIASTOLIC BLOOD PRESSURE: 76 MMHG | BODY MASS INDEX: 32.25 KG/M2 | WEIGHT: 205.47 LBS | HEIGHT: 67 IN | SYSTOLIC BLOOD PRESSURE: 159 MMHG | HEART RATE: 60 BPM

## 2022-12-15 LAB
AORTIC ARCH: 3 CM
ASCENDING AORTA: 3.4 CM
BH CV ECHO MEAS - ACS: 2.3 CM
BH CV ECHO MEAS - AI P1/2T: 575.6 MSEC
BH CV ECHO MEAS - AO MAX PG: 8.2 MMHG
BH CV ECHO MEAS - AO MEAN PG: 4.4 MMHG
BH CV ECHO MEAS - AO ROOT DIAM: 4.4 CM
BH CV ECHO MEAS - AO V2 MAX: 143.5 CM/SEC
BH CV ECHO MEAS - AO V2 VTI: 30.4 CM
BH CV ECHO MEAS - AVA(I,D): 2.8 CM2
BH CV ECHO MEAS - EDV(CUBED): 124 ML
BH CV ECHO MEAS - EDV(MOD-SP2): 102 ML
BH CV ECHO MEAS - EDV(MOD-SP4): 117 ML
BH CV ECHO MEAS - EF(MOD-BP): 58.3 %
BH CV ECHO MEAS - EF(MOD-SP2): 55.9 %
BH CV ECHO MEAS - EF(MOD-SP4): 59 %
BH CV ECHO MEAS - ESV(CUBED): 35.6 ML
BH CV ECHO MEAS - ESV(MOD-SP2): 45 ML
BH CV ECHO MEAS - ESV(MOD-SP4): 48 ML
BH CV ECHO MEAS - FS: 34 %
BH CV ECHO MEAS - IVS/LVPW: 0.98 CM
BH CV ECHO MEAS - IVSD: 0.96 CM
BH CV ECHO MEAS - LAT PEAK E' VEL: 6.6 CM/SEC
BH CV ECHO MEAS - LV DIASTOLIC VOL/BSA (35-75): 57.2 CM2
BH CV ECHO MEAS - LV MASS(C)D: 175.5 GRAMS
BH CV ECHO MEAS - LV MAX PG: 3.5 MMHG
BH CV ECHO MEAS - LV MEAN PG: 1.85 MMHG
BH CV ECHO MEAS - LV SYSTOLIC VOL/BSA (12-30): 23.5 CM2
BH CV ECHO MEAS - LV V1 MAX: 94.1 CM/SEC
BH CV ECHO MEAS - LV V1 VTI: 19.5 CM
BH CV ECHO MEAS - LVIDD: 5 CM
BH CV ECHO MEAS - LVIDS: 3.3 CM
BH CV ECHO MEAS - LVOT AREA: 4.4 CM2
BH CV ECHO MEAS - LVOT DIAM: 2.35 CM
BH CV ECHO MEAS - LVPWD: 0.99 CM
BH CV ECHO MEAS - MED PEAK E' VEL: 4.1 CM/SEC
BH CV ECHO MEAS - MV A DUR: 0.18 SEC
BH CV ECHO MEAS - MV A MAX VEL: 66 CM/SEC
BH CV ECHO MEAS - MV DEC SLOPE: 215.1 CM/SEC2
BH CV ECHO MEAS - MV DEC TIME: 0.27 MSEC
BH CV ECHO MEAS - MV E MAX VEL: 50.9 CM/SEC
BH CV ECHO MEAS - MV E/A: 0.77
BH CV ECHO MEAS - MV MAX PG: 3.2 MMHG
BH CV ECHO MEAS - MV MEAN PG: 1.27 MMHG
BH CV ECHO MEAS - MV P1/2T: 91.9 MSEC
BH CV ECHO MEAS - MV V2 VTI: 29.3 CM
BH CV ECHO MEAS - MVA(P1/2T): 2.39 CM2
BH CV ECHO MEAS - MVA(VTI): 2.9 CM2
BH CV ECHO MEAS - PA ACC TIME: 0.11 SEC
BH CV ECHO MEAS - PA PR(ACCEL): 29.9 MMHG
BH CV ECHO MEAS - PA V2 MAX: 90.5 CM/SEC
BH CV ECHO MEAS - PI END-D VEL: 105 CM/SEC
BH CV ECHO MEAS - PULM A REVS DUR: 0.19 SEC
BH CV ECHO MEAS - PULM A REVS VEL: 33.7 CM/SEC
BH CV ECHO MEAS - PULM DIAS VEL: 26.6 CM/SEC
BH CV ECHO MEAS - PULM S/D: 2.12
BH CV ECHO MEAS - PULM SYS VEL: 56.6 CM/SEC
BH CV ECHO MEAS - QP/QS: 1.47
BH CV ECHO MEAS - RAP SYSTOLE: 8 MMHG
BH CV ECHO MEAS - RV MAX PG: 2.46 MMHG
BH CV ECHO MEAS - RV V1 MAX: 78.4 CM/SEC
BH CV ECHO MEAS - RV V1 VTI: 17.1 CM
BH CV ECHO MEAS - RVOT DIAM: 3 CM
BH CV ECHO MEAS - SI(MOD-SP2): 27.9 ML/M2
BH CV ECHO MEAS - SI(MOD-SP4): 33.8 ML/M2
BH CV ECHO MEAS - SUP REN AO DIAM: 2.5 CM
BH CV ECHO MEAS - SV(LVOT): 84.6 ML
BH CV ECHO MEAS - SV(MOD-SP2): 57 ML
BH CV ECHO MEAS - SV(MOD-SP4): 69 ML
BH CV ECHO MEAS - SV(RVOT): 124.5 ML
BH CV ECHO MEAS - TAPSE (>1.6): 2.7 CM
BH CV ECHO MEASUREMENTS AVERAGE E/E' RATIO: 9.51
BH CV XLRA - RV BASE: 3.2 CM
BH CV XLRA - RV LENGTH: 6.9 CM
BH CV XLRA - RV MID: 2.8 CM
BH CV XLRA - TDI S': 15.1 CM/SEC
LEFT ATRIUM VOLUME INDEX: 27.4 ML/M2
MAXIMAL PREDICTED HEART RATE: 138 BPM
SINUS: 4.4 CM
STJ: 3.3 CM
STRESS TARGET HR: 117 BPM

## 2022-12-15 PROCEDURE — 93306 TTE W/DOPPLER COMPLETE: CPT

## 2022-12-15 PROCEDURE — 93306 TTE W/DOPPLER COMPLETE: CPT | Performed by: INTERNAL MEDICINE

## 2022-12-16 DIAGNOSIS — I77.810 DILATED AORTIC ROOT: Primary | ICD-10-CM

## 2023-01-05 ENCOUNTER — PRE-ADMISSION TESTING (OUTPATIENT)
Dept: PREADMISSION TESTING | Facility: HOSPITAL | Age: 83
End: 2023-01-05
Payer: MEDICARE

## 2023-01-05 VITALS
HEIGHT: 67 IN | TEMPERATURE: 98.2 F | RESPIRATION RATE: 18 BRPM | OXYGEN SATURATION: 96 % | WEIGHT: 207 LBS | SYSTOLIC BLOOD PRESSURE: 136 MMHG | HEART RATE: 91 BPM | BODY MASS INDEX: 32.49 KG/M2 | DIASTOLIC BLOOD PRESSURE: 77 MMHG

## 2023-01-05 LAB
ANION GAP SERPL CALCULATED.3IONS-SCNC: 8 MMOL/L (ref 5–15)
BUN SERPL-MCNC: 18 MG/DL (ref 8–23)
BUN/CREAT SERPL: 15.4 (ref 7–25)
CALCIUM SPEC-SCNC: 9 MG/DL (ref 8.6–10.5)
CHLORIDE SERPL-SCNC: 102 MMOL/L (ref 98–107)
CO2 SERPL-SCNC: 29 MMOL/L (ref 22–29)
CREAT SERPL-MCNC: 1.17 MG/DL (ref 0.76–1.27)
DEPRECATED RDW RBC AUTO: 43.4 FL (ref 37–54)
EGFRCR SERPLBLD CKD-EPI 2021: 62.2 ML/MIN/1.73
ERYTHROCYTE [DISTWIDTH] IN BLOOD BY AUTOMATED COUNT: 12.5 % (ref 12.3–15.4)
GLUCOSE SERPL-MCNC: 160 MG/DL (ref 65–99)
HCT VFR BLD AUTO: 42.2 % (ref 37.5–51)
HGB BLD-MCNC: 13.8 G/DL (ref 13–17.7)
MCH RBC QN AUTO: 30.8 PG (ref 26.6–33)
MCHC RBC AUTO-ENTMCNC: 32.7 G/DL (ref 31.5–35.7)
MCV RBC AUTO: 94.2 FL (ref 79–97)
PLATELET # BLD AUTO: 185 10*3/MM3 (ref 140–450)
PMV BLD AUTO: 10.6 FL (ref 6–12)
POTASSIUM SERPL-SCNC: 3.7 MMOL/L (ref 3.5–5.2)
RBC # BLD AUTO: 4.48 10*6/MM3 (ref 4.14–5.8)
SODIUM SERPL-SCNC: 139 MMOL/L (ref 136–145)
WBC NRBC COR # BLD: 7.42 10*3/MM3 (ref 3.4–10.8)

## 2023-01-05 PROCEDURE — 85027 COMPLETE CBC AUTOMATED: CPT

## 2023-01-05 PROCEDURE — 36415 COLL VENOUS BLD VENIPUNCTURE: CPT

## 2023-01-05 PROCEDURE — 80048 BASIC METABOLIC PNL TOTAL CA: CPT

## 2023-01-05 RX ORDER — ATENOLOL 50 MG/1
50 TABLET ORAL EVERY EVENING
COMMUNITY

## 2023-01-05 NOTE — DISCHARGE INSTRUCTIONS
Take the following medications the morning of surgery: SERTRALINE AND TENORMIN    ARRIVE AT 6 AM ON 1/12/23    If you are on prescription narcotic pain medication to control your pain you may also take that medication the morning of surgery.    General Instructions:  Do not eat solid food after midnight the night before surgery.  You may drink clear liquids day of surgery but must stop at least one hour before your hospital arrival time.  It is beneficial for you to have a clear drink that contains carbohydrates the day of surgery.  We suggest a 12 to 20 ounce bottle of Gatorade or Powerade for non-diabetic patients or a 12 to 20 ounce bottle of G2 or Powerade Zero for diabetic patients. (Pediatric patients, are not advised to drink a 12 to 20 ounce carbohydrate drink)    Clear liquids are liquids you can see through.  Nothing red in color.     Plain water                               Sports drinks  Sodas                                   Gelatin (Jell-O)  Fruit juices without pulp such as white grape juice and apple juice  Popsicles that contain no fruit or yogurt  Tea or coffee (no cream or milk added)  Gatorade / Powerade  G2 / Powerade Zero    Infants may have breast milk up to four hours before surgery.  Infants drinking formula may drink formula up to six hours before surgery.   Patients who avoid smoking, chewing tobacco and alcohol for 4 weeks prior to surgery have a reduced risk of post-operative complications.  Quit smoking as many days before surgery as you can.  Do not smoke, use chewing tobacco or drink alcohol the day of surgery.   If applicable bring your C-PAP/ BI-PAP machine.  Bring any papers given to you in the doctor’s office.  Wear clean comfortable clothes.  Do not wear contact lenses, false eyelashes or make-up.  Bring a case for your glasses.   Bring crutches or walker if applicable.  Remove all piercings.  Leave jewelry and any other valuables at home.  Hair extensions with metal clips must  be removed prior to surgery.  The Pre-Admission Testing nurse will instruct you to bring medications if unable to obtain an accurate list in Pre-Admission Testing.        If you were given a blood bank ID arm band remember to bring it with you the day of surgery.    Preventing a Surgical Site Infection:  For 2 to 3 days before surgery, avoid shaving with a razor because the razor can irritate skin and make it easier to develop an infection.    Any areas of open skin can increase the risk of a post-operative wound infection by allowing bacteria to enter and travel throughout the body.  Notify your surgeon if you have any skin wounds / rashes even if it is not near the expected surgical site.  The area will need assessed to determine if surgery should be delayed until it is healed.  The night prior to surgery shower using a fresh bar of anti-bacterial soap (such as Dial) and clean washcloth.  Sleep in a clean bed with clean clothing.  Do not allow pets to sleep with you.  Shower on the morning of surgery using a fresh bar of anti-bacterial soap (such as Dial) and clean washcloth.  Dry with a clean towel and dress in clean clothing.  Ask your surgeon if you will be receiving antibiotics prior to surgery.  Make sure you, your family, and all healthcare providers clean their hands with soap and water or an alcohol based hand  before caring for you or your wound.  CHLORHEXIDINE CLOTH INSTRUCTIONS  The morning of surgery follow these instructions using the Chlorhexidine cloths you've been given.  These steps reduce bacteria on the body.  Do not use the cloths near your eyes, ears mouth, genitalia or on open wounds.  Throw the cloths away after use but do not try to flush them down a toilet.      Open and remove one cloth at a time from the package.    Leave the cloth unfolded and begin the bathing.  Massage the skin with the cloths using gentle pressure to remove bacteria.  Do not scrub harshly.   Follow the steps  below with one 2% CHG cloth per area (6 total cloths).  One cloth for neck, shoulders and chest.  One cloth for both arms, hands, fingers and underarms (do underarms last).  One cloth for the abdomen followed by groin.  One cloth for right leg and foot including between the toes.  One cloth for left leg and foot including between the toes.  The last cloth is to be used for the back of the neck, back and buttocks.    Allow the CHG to air dry 3 minutes on the skin which will give it time to work and decrease the chance of irritation.  The skin may feel sticky until it is dry.  Do not rinse with water or any other liquid or you will lose the beneficial effects of the CHG.  If mild skin irritation occurs, do rinse the skin to remove the CHG.  Report this to the nurse at time of admission.  Do not apply lotions, creams, ointments, deodorants or perfumes after using the clothes. Dress in clean clothes before coming to the hospital.   Day of surgery:  Your arrival time is approximately two hours before your scheduled surgery time.  Upon arrival, a Pre-op nurse and Anesthesiologist will review your health history, obtain vital signs, and answer questions you may have.  The only belongings needed at this time will be a list of your home medications and if applicable your C-PAP/BI-PAP machine.  A Pre-op nurse will start an IV and you may receive medication in preparation for surgery, including something to help you relax.     Please be aware that surgery does come with discomfort.  We want to make every effort to control your discomfort so please discuss any uncontrolled symptoms with your nurse.   Your doctor will most likely have prescribed pain medications.      If you are going home after surgery you will receive individualized written care instructions before being discharged.  A responsible adult must drive you to and from the hospital on the day of your surgery and stay with you for 24 hours.  Discharge prescriptions  can be filled by the hospital pharmacy during regular pharmacy hours.  If you are having surgery late in the day/evening your prescription may be e-prescribed to your pharmacy.  Please verify your pharmacy hours or chose a 24 hour pharmacy to avoid not having access to your prescription because your pharmacy has closed for the day.    If you are staying overnight following surgery, you will be transported to your hospital room following the recovery period.  [facility] has all private rooms.    If you have any questions please call Pre-Admission Testing at [phone].  Deductibles and co-payments are collected on the day of service. Please be prepared to pay the required co-pay, deductible or deposit on the day of service as defined by your plan.    Call your surgeon immediately if you experience any of the following symptoms:  Sore Throat  Shortness of Breath or difficulty breathing  Cough  Chills  Body soreness or muscle pain  Headache  Fever  New loss of taste or smell  Do not arrive for your surgery ill.  Your procedure will need to be rescheduled to another time.  You will need to call your physician before the day of surgery to avoid any unnecessary exposure to hospital staff as well as other patients.

## 2023-01-12 ENCOUNTER — HOSPITAL ENCOUNTER (OUTPATIENT)
Facility: HOSPITAL | Age: 83
Setting detail: HOSPITAL OUTPATIENT SURGERY
Discharge: HOME | End: 2023-01-12
Attending: STUDENT IN AN ORGANIZED HEALTH CARE EDUCATION/TRAINING PROGRAM | Admitting: STUDENT IN AN ORGANIZED HEALTH CARE EDUCATION/TRAINING PROGRAM
Payer: MEDICARE

## 2023-01-12 ENCOUNTER — ANESTHESIA EVENT (OUTPATIENT)
Dept: PERIOP | Facility: HOSPITAL | Age: 83
End: 2023-01-12
Payer: MEDICARE

## 2023-01-12 ENCOUNTER — ANESTHESIA (OUTPATIENT)
Dept: PERIOP | Facility: HOSPITAL | Age: 83
End: 2023-01-12
Payer: MEDICARE

## 2023-01-12 VITALS
SYSTOLIC BLOOD PRESSURE: 143 MMHG | DIASTOLIC BLOOD PRESSURE: 68 MMHG | RESPIRATION RATE: 16 BRPM | OXYGEN SATURATION: 95 % | TEMPERATURE: 97.7 F | HEART RATE: 60 BPM

## 2023-01-12 DIAGNOSIS — R22.2 MASS OF CHEST: ICD-10-CM

## 2023-01-12 PROCEDURE — 25010000002 CEFAZOLIN IN DEXTROSE 2-4 GM/100ML-% SOLUTION: Performed by: STUDENT IN AN ORGANIZED HEALTH CARE EDUCATION/TRAINING PROGRAM

## 2023-01-12 PROCEDURE — 25010000002 PROPOFOL 10 MG/ML EMULSION: Performed by: NURSE ANESTHETIST, CERTIFIED REGISTERED

## 2023-01-12 PROCEDURE — 11404 EXC TR-EXT B9+MARG 3.1-4 CM: CPT | Performed by: STUDENT IN AN ORGANIZED HEALTH CARE EDUCATION/TRAINING PROGRAM

## 2023-01-12 PROCEDURE — 11406 EXC TR-EXT B9+MARG >4.0 CM: CPT | Performed by: STUDENT IN AN ORGANIZED HEALTH CARE EDUCATION/TRAINING PROGRAM

## 2023-01-12 RX ORDER — DIPHENHYDRAMINE HCL 25 MG
25 CAPSULE ORAL
Status: DISCONTINUED | OUTPATIENT
Start: 2023-01-12 | End: 2023-01-12 | Stop reason: HOSPADM

## 2023-01-12 RX ORDER — SODIUM CHLORIDE 0.9 % (FLUSH) 0.9 %
3-10 SYRINGE (ML) INJECTION AS NEEDED
Status: DISCONTINUED | OUTPATIENT
Start: 2023-01-12 | End: 2023-01-12 | Stop reason: HOSPADM

## 2023-01-12 RX ORDER — FENTANYL CITRATE 50 UG/ML
50 INJECTION, SOLUTION INTRAMUSCULAR; INTRAVENOUS
Status: DISCONTINUED | OUTPATIENT
Start: 2023-01-12 | End: 2023-01-12 | Stop reason: HOSPADM

## 2023-01-12 RX ORDER — FLUMAZENIL 0.1 MG/ML
0.2 INJECTION INTRAVENOUS AS NEEDED
Status: DISCONTINUED | OUTPATIENT
Start: 2023-01-12 | End: 2023-01-12 | Stop reason: HOSPADM

## 2023-01-12 RX ORDER — EPHEDRINE SULFATE 50 MG/ML
5 INJECTION, SOLUTION INTRAVENOUS ONCE AS NEEDED
Status: DISCONTINUED | OUTPATIENT
Start: 2023-01-12 | End: 2023-01-12 | Stop reason: HOSPADM

## 2023-01-12 RX ORDER — DIPHENHYDRAMINE HYDROCHLORIDE 50 MG/ML
12.5 INJECTION INTRAMUSCULAR; INTRAVENOUS
Status: DISCONTINUED | OUTPATIENT
Start: 2023-01-12 | End: 2023-01-12 | Stop reason: HOSPADM

## 2023-01-12 RX ORDER — ONDANSETRON 2 MG/ML
4 INJECTION INTRAMUSCULAR; INTRAVENOUS ONCE AS NEEDED
Status: DISCONTINUED | OUTPATIENT
Start: 2023-01-12 | End: 2023-01-12 | Stop reason: HOSPADM

## 2023-01-12 RX ORDER — HYDROCODONE BITARTRATE AND ACETAMINOPHEN 7.5; 325 MG/1; MG/1
1 TABLET ORAL ONCE AS NEEDED
Status: DISCONTINUED | OUTPATIENT
Start: 2023-01-12 | End: 2023-01-12 | Stop reason: HOSPADM

## 2023-01-12 RX ORDER — FAMOTIDINE 10 MG/ML
20 INJECTION, SOLUTION INTRAVENOUS ONCE
Status: COMPLETED | OUTPATIENT
Start: 2023-01-12 | End: 2023-01-12

## 2023-01-12 RX ORDER — NALOXONE HCL 0.4 MG/ML
0.2 VIAL (ML) INJECTION AS NEEDED
Status: DISCONTINUED | OUTPATIENT
Start: 2023-01-12 | End: 2023-01-12 | Stop reason: HOSPADM

## 2023-01-12 RX ORDER — SODIUM CHLORIDE, SODIUM LACTATE, POTASSIUM CHLORIDE, CALCIUM CHLORIDE 600; 310; 30; 20 MG/100ML; MG/100ML; MG/100ML; MG/100ML
9 INJECTION, SOLUTION INTRAVENOUS CONTINUOUS
Status: DISCONTINUED | OUTPATIENT
Start: 2023-01-12 | End: 2023-01-12 | Stop reason: HOSPADM

## 2023-01-12 RX ORDER — LABETALOL HYDROCHLORIDE 5 MG/ML
5 INJECTION, SOLUTION INTRAVENOUS
Status: DISCONTINUED | OUTPATIENT
Start: 2023-01-12 | End: 2023-01-12 | Stop reason: HOSPADM

## 2023-01-12 RX ORDER — HYDROMORPHONE HYDROCHLORIDE 1 MG/ML
0.5 INJECTION, SOLUTION INTRAMUSCULAR; INTRAVENOUS; SUBCUTANEOUS
Status: DISCONTINUED | OUTPATIENT
Start: 2023-01-12 | End: 2023-01-12 | Stop reason: HOSPADM

## 2023-01-12 RX ORDER — CEFAZOLIN SODIUM 2 G/100ML
2 INJECTION, SOLUTION INTRAVENOUS ONCE
Status: COMPLETED | OUTPATIENT
Start: 2023-01-12 | End: 2023-01-12

## 2023-01-12 RX ORDER — OXYCODONE AND ACETAMINOPHEN 7.5; 325 MG/1; MG/1
1 TABLET ORAL EVERY 4 HOURS PRN
Status: DISCONTINUED | OUTPATIENT
Start: 2023-01-12 | End: 2023-01-12 | Stop reason: HOSPADM

## 2023-01-12 RX ORDER — PROPOFOL 10 MG/ML
VIAL (ML) INTRAVENOUS CONTINUOUS PRN
Status: DISCONTINUED | OUTPATIENT
Start: 2023-01-12 | End: 2023-01-12 | Stop reason: SURG

## 2023-01-12 RX ORDER — MIDAZOLAM HYDROCHLORIDE 1 MG/ML
0.5 INJECTION INTRAMUSCULAR; INTRAVENOUS
Status: DISCONTINUED | OUTPATIENT
Start: 2023-01-12 | End: 2023-01-12 | Stop reason: HOSPADM

## 2023-01-12 RX ORDER — SODIUM CHLORIDE 0.9 % (FLUSH) 0.9 %
3 SYRINGE (ML) INJECTION EVERY 12 HOURS SCHEDULED
Status: DISCONTINUED | OUTPATIENT
Start: 2023-01-12 | End: 2023-01-12 | Stop reason: HOSPADM

## 2023-01-12 RX ORDER — LIDOCAINE HYDROCHLORIDE 20 MG/ML
INJECTION, SOLUTION INTRAVENOUS AS NEEDED
Status: DISCONTINUED | OUTPATIENT
Start: 2023-01-12 | End: 2023-01-12 | Stop reason: SURG

## 2023-01-12 RX ORDER — BUPIVACAINE HYDROCHLORIDE AND EPINEPHRINE 5; 5 MG/ML; UG/ML
INJECTION, SOLUTION EPIDURAL; INTRACAUDAL; PERINEURAL AS NEEDED
Status: DISCONTINUED | OUTPATIENT
Start: 2023-01-12 | End: 2023-01-12 | Stop reason: HOSPADM

## 2023-01-12 RX ORDER — LIDOCAINE HYDROCHLORIDE 10 MG/ML
0.5 INJECTION, SOLUTION EPIDURAL; INFILTRATION; INTRACAUDAL; PERINEURAL ONCE AS NEEDED
Status: DISCONTINUED | OUTPATIENT
Start: 2023-01-12 | End: 2023-01-12 | Stop reason: HOSPADM

## 2023-01-12 RX ORDER — MAGNESIUM HYDROXIDE 1200 MG/15ML
LIQUID ORAL AS NEEDED
Status: DISCONTINUED | OUTPATIENT
Start: 2023-01-12 | End: 2023-01-12 | Stop reason: HOSPADM

## 2023-01-12 RX ORDER — HYDRALAZINE HYDROCHLORIDE 20 MG/ML
5 INJECTION INTRAMUSCULAR; INTRAVENOUS
Status: DISCONTINUED | OUTPATIENT
Start: 2023-01-12 | End: 2023-01-12 | Stop reason: HOSPADM

## 2023-01-12 RX ADMIN — PROPOFOL 300 MCG/KG/MIN: 10 INJECTION, EMULSION INTRAVENOUS at 08:05

## 2023-01-12 RX ADMIN — SODIUM CHLORIDE, POTASSIUM CHLORIDE, SODIUM LACTATE AND CALCIUM CHLORIDE 9 ML/HR: 600; 310; 30; 20 INJECTION, SOLUTION INTRAVENOUS at 06:36

## 2023-01-12 RX ADMIN — CEFAZOLIN SODIUM 2 G: 2 INJECTION, SOLUTION INTRAVENOUS at 07:56

## 2023-01-12 RX ADMIN — FAMOTIDINE 20 MG: 10 INJECTION INTRAVENOUS at 06:36

## 2023-01-12 RX ADMIN — LIDOCAINE HYDROCHLORIDE 60 MG: 20 INJECTION, SOLUTION INTRAVENOUS at 08:05

## 2023-01-12 NOTE — OP NOTE
OPERATIVE REPORT    DATE: 1/12/2023    SURGEON: Rebecca Lofton MD     OPERATION PERFORMED: Excision of subcutaneous mass of the bilateral chest    PREOPERATIVE DIAGNOSIS: Subcutaneous mass x2    POSTOPERATIVE DIAGNOSIS: Same    ANESTHESIA: MAC    SPECIMEN: Subcutaneous mass of the chest x2    DRAINS: None    BLOOD LOSS: Minimal    INDICATION FOR OPERATION: Mr. Mikey Crawely is a 82 y.o. year old gentleman who was recently seen in the office with 2 enlarging chest wall masses.  These had increased in size and were causing pain and discomfort.  He requested removal. All risks (including bleeding, infection, damage to surrounding structures, recurrence), benefits and alternatives were explained to the patient who agreed and wished to proceed. Informed consent was signed.     OPERATIVE COURSE: The patient was taken to the operating room, transferred onto the operating room table, and underwent anesthesia without incident. The patient was prepped and draped in sterile fashion. A time out was performed and preoperative antibiotics were given.  Half percent Marcaine was injected into the skin and subcutaneous tissues.  We started with the left soft tissue mass near his pacemaker.  Bovie electrocautery was not used due to close proximity to the pacemaker.  Incision was made over the mass.  It was a very large sebaceous cyst, approximately 6cm in size. It was removed piecemeal in its entirety including the entire capsule.  Is passed off for specimen.  An incision was then made over the right chest wall mass.  This was a lipoma that was circumferentially dissected free and removed piecemeal. It was approximately 4cm in size.These were passed off for pathology.  The remainder of the local anesthetic was injected.  The incision was closed with interrupted 3-0 Vicryl suture in a running 4-0 Monocryl suture.  Skin glue was placed over the incision.  The patient tolerated the procedure well. All needle and lap counts were  correct at the end of the case. The patient was then awoken from anesthesia and taken to recovery for further monitoring.       Rebecca Lofton MD   General and Endoscopic Surgery  [facility]    [address], [address]  [place], [place], [address]  P: [phone]  F: [phone]

## 2023-01-12 NOTE — ANESTHESIA POSTPROCEDURE EVALUATION
Patient: Mikey Crawley    Procedure Summary     Date: 01/12/23 Room / Location:  [facility] /  [facility]    Anesthesia Start: 0758 Anesthesia Stop: 0849    Procedure: SKIN LESION EXCISION OF CHEST X2 (Bilateral) Diagnosis:       Mass of chest      (Mass of chest [R22.2])    Surgeons: Rebecca Lofton MD Provider: Eber King MD    Anesthesia Type: MAC ASA Status: 3          Anesthesia Type: MAC    Vitals  Vitals Value Taken Time   /68 01/12/23 0900   Temp 36.5 °C (97.7 °F) 01/12/23 0900   Pulse 59 01/12/23 0902   Resp 16 01/12/23 0900   SpO2 95 % 01/12/23 0902   Vitals shown include unvalidated device data.        Post Anesthesia Care and Evaluation    Level of consciousness: awake and alert    Comments: /68 (BP Location: Left arm, Patient Position: Lying)   Pulse 60   Temp 36.5 °C (97.7 °F) (Oral)   Resp 16   SpO2 95%   No anesthesia care post op

## 2023-01-12 NOTE — ANESTHESIA PREPROCEDURE EVALUATION
Anesthesia Evaluation     NPO Solid Status: > 8 hours  NPO Liquid Status: > 8 hours           Airway   Mallampati: II  TM distance: >3 FB  No difficulty expected  Dental      Pulmonary    (+) shortness of breath, sleep apnea,   Cardiovascular     (+) pacemaker pacemaker, hypertension, hyperlipidemia,       Neuro/Psych  (+) psychiatric history,    GI/Hepatic/Renal/Endo      Musculoskeletal     Abdominal    Substance History      OB/GYN          Other   arthritis,                    Anesthesia Plan    ASA 3     MAC     intravenous induction     Anesthetic plan, risks, benefits, and alternatives have been provided, discussed and informed consent has been obtained with: patient.    Plan discussed with CRNA.        CODE STATUS:

## 2023-01-12 NOTE — ANESTHESIA POSTPROCEDURE EVALUATION
Patient: Mikey Crawley    Procedure Summary     Date: 01/12/23 Room / Location:  [facility]    Anesthesia Start: 0758 Anesthesia Stop: 0849    Procedure: SKIN LESION EXCISION OF CHEST X2 (Bilateral) Diagnosis:       Mass of chest      (Mass of chest [R22.2])    Surgeons: Rebecca Lofton MD Provider: Eber King MD    Anesthesia Type: MAC ASA Status: 3          Anesthesia Type: MAC    Vitals  Vitals Value Taken Time   /68 01/12/23 0900   Temp 36.5 °C (97.7 °F) 01/12/23 0900   Pulse 59 01/12/23 0902   Resp 16 01/12/23 0900   SpO2 95 % 01/12/23 0902   Vitals shown include unvalidated device data.        Post Anesthesia Care and Evaluation    Patient location during evaluation: PACU  Patient participation: complete - patient participated  Level of consciousness: awake  Pain scale: See nurse's notes for pain score.  Pain management: adequate    Airway patency: patent  Anesthetic complications: No anesthetic complications  PONV Status: none  Cardiovascular status: acceptable  Respiratory status: acceptable  Hydration status: acceptable    Comments: Patient seen and examined postoperatively; vital signs stable; SpO2 greater than or equal to 90%; cardiopulmonary status stable; nausea/vomiting adequately controlled; pain adequately controlled; no apparent anesthesia complications; patient discharged from anesthesia care when discharge criteria were met

## 2023-01-12 NOTE — ANESTHESIA PROCEDURE NOTES
Peripheral IV    Patient location during procedure: OR  Start time: 1/12/2023 8:41 AM  End time: 1/12/2023 8:41 AM  Line placed for Other.  Performed By   CRNA/CAA: Pratima Valerio CRNA  Preanesthetic Checklist  Completed: patient identified  Peripheral IV Prep   Patient position: supine   Prep: alcohol swabs  Peripheral IV Procedure   Laterality:right  Location:  Hand  Catheter size: 22 G          Post Assessment   Dressing Type: transparent.    IV Dressing/Site: dry, clean and intact

## 2023-01-13 LAB
LAB AP CASE REPORT: NORMAL
PATH REPORT.FINAL DX SPEC: NORMAL
PATH REPORT.GROSS SPEC: NORMAL

## 2023-01-30 ENCOUNTER — OFFICE VISIT (OUTPATIENT)
Dept: SURGERY | Facility: CLINIC | Age: 83
End: 2023-01-30
Payer: MEDICARE

## 2023-01-30 DIAGNOSIS — L72.0 EPIDERMAL INCLUSION CYST: Primary | ICD-10-CM

## 2023-01-30 PROCEDURE — 99024 POSTOP FOLLOW-UP VISIT: CPT | Performed by: STUDENT IN AN ORGANIZED HEALTH CARE EDUCATION/TRAINING PROGRAM

## 2023-01-30 NOTE — PROGRESS NOTES
General Surgery Post-Operative Follow Up Note     Summary:    Mikey Crawley is a 82 y.o. year old who presents for post-operative follow up from excision of subcutaneous mass of the bilateral chest wall.  Doing well with no acute issues.  Small hematoma at the right incision site, no evidence of infection.  Local wound care instructions given.  Follow-up as needed.    History of Present Illness:    Mikey Crawley is a 82 y.o. year old who presents for post-operative follow up.  He has done well since surgery.  He has no issues or concerns.  His pain is controlled.  He is getting back to his daily activities.  He has a small bruise at the right chest site    Procedure:    1/12/2023 Excision of subcutaneous mass of the bilateral chest    Pathology:    Final Diagnosis   1. Left and Right Chest Wall Cyst:               A. Fragments of epidermal inclusion cyst and cyst contents.     Physical Exam:   • Constitutional: Well-developed well-nourished, no acute distress  • Eyes: Conjunctiva normal, sclera nonicteric  • ENMT: Hearing grossly normal, oral mucosa moist  • Respiratory: No increased work of breathing, normal inspiratory effort  • Cardiovascular: Regular rate, no peripheral edema, no jugular venous distention  • Gastrointestinal: Soft, nontender  • Skin:  Warm, dry, no rash on visualized skin surfaces; incision site on bilateral chest clean and dry, small hematoma on the right side  • Musculoskeletal: Symmetric strength, normal gait  • Psychiatric: Alert and oriented ×3, normal affect       Rebecca Lofton M.D.  General and Endoscopic Surgery  [facility]    [address], [address]  [place], [place], [address]  P: [phone]  F: [phone]

## 2023-01-31 ENCOUNTER — HOSPITAL ENCOUNTER (OUTPATIENT)
Dept: CT IMAGING | Facility: HOSPITAL | Age: 83
Discharge: HOME | End: 2023-01-31
Admitting: NURSE PRACTITIONER
Payer: MEDICARE

## 2023-01-31 LAB — CREAT BLDA-MCNC: 1.3 MG/DL (ref 0.6–1.3)

## 2023-01-31 PROCEDURE — 82565 ASSAY OF CREATININE: CPT

## 2023-01-31 PROCEDURE — 0 IOPAMIDOL PER 1 ML: Performed by: NURSE PRACTITIONER

## 2023-01-31 PROCEDURE — 71275 CT ANGIOGRAPHY CHEST: CPT

## 2023-01-31 RX ADMIN — IOPAMIDOL 85 ML: 755 INJECTION, SOLUTION INTRAVENOUS at 17:08

## 2023-02-01 ENCOUNTER — TELEPHONE (OUTPATIENT)
Dept: CARDIOLOGY | Facility: CLINIC | Age: 83
End: 2023-02-01
Payer: MEDICARE

## 2023-02-01 DIAGNOSIS — R91.1 LUNG NODULE: Primary | ICD-10-CM

## 2023-02-01 NOTE — TELEPHONE ENCOUNTER
I am not sure why Cynthia ordered a CT scan of the chest but it shows just some mild enlargement of the aortic root.  Nothing I am concerned about given his age.  The radiologist mentioned that there was a calcified lymph node and they recommend looking at the CT scan again in 3 months.  I have ordered a noncontrasted CT.  Repeat CT scan should be in May.

## 2023-02-01 NOTE — TELEPHONE ENCOUNTER
I spoke with Mikey Crawley and updated pt on results/recommendations from provider.  Pt verbalized understanding and has no further questions at this time.    Scheduling,  Can you please contact pt to have CT chest scheduled in May?    Thank you,    Evette Connolly, CAREY  [place] [facility] [place]  02/01/23 14:58 EST

## 2023-02-01 NOTE — TELEPHONE ENCOUNTER
I called and spoke with pt's wife, Rosemarie, who reports that pt is away.  She's going to provide him with our office number to call once he returns.    Thank you,    Evette Connolly RN  Triage [facility] [place]  02/01/23 13:09 EST

## 2023-02-09 PROCEDURE — 93294 REM INTERROG EVL PM/LDLS PM: CPT | Performed by: INTERNAL MEDICINE

## 2023-02-09 PROCEDURE — 93296 REM INTERROG EVL PM/IDS: CPT | Performed by: INTERNAL MEDICINE

## 2023-02-15 ENCOUNTER — TELEPHONE (OUTPATIENT)
Dept: FAMILY MEDICINE CLINIC | Facility: CLINIC | Age: 83
End: 2023-02-15
Payer: MEDICARE

## 2023-02-15 NOTE — TELEPHONE ENCOUNTER
Called pt and left VM regarding appt on 2/23/23 the appt will need to be rescheduled due to Dr. Torres will be out of the office.    Ok for hub to read and schedule for next available appt

## 2023-03-03 ENCOUNTER — OFFICE VISIT (OUTPATIENT)
Dept: FAMILY MEDICINE CLINIC | Facility: CLINIC | Age: 83
End: 2023-03-03
Payer: MEDICARE

## 2023-03-03 VITALS
HEIGHT: 67 IN | OXYGEN SATURATION: 98 % | BODY MASS INDEX: 33.02 KG/M2 | SYSTOLIC BLOOD PRESSURE: 130 MMHG | HEART RATE: 67 BPM | DIASTOLIC BLOOD PRESSURE: 63 MMHG | WEIGHT: 210.4 LBS | TEMPERATURE: 97.9 F

## 2023-03-03 DIAGNOSIS — R79.9 ABNORMAL FINDING OF BLOOD CHEMISTRY, UNSPECIFIED: ICD-10-CM

## 2023-03-03 DIAGNOSIS — I10 ESSENTIAL HYPERTENSION: Primary | Chronic | ICD-10-CM

## 2023-03-03 DIAGNOSIS — F33.42 RECURRENT MAJOR DEPRESSIVE DISORDER, IN FULL REMISSION: Chronic | ICD-10-CM

## 2023-03-03 PROCEDURE — 99213 OFFICE O/P EST LOW 20 MIN: CPT | Performed by: FAMILY MEDICINE

## 2023-03-03 NOTE — PROGRESS NOTES
"Chief Complaint  Hypertension    Subjective        Mikey Crawley presents to [facility]  History of Present Illness     Follow-up hypertension.  He continues on medication which we went over.  He also continues the sertraline for his depression.  Blood pressure today looks good.  He is having some ongoing hand arthritis issues but he is coping with it.  His chief concern is caregiver stress.  His wife is having more trouble with her memory and depression.  They live in a two-story home.  She has had no falls recently.    Objective   Vital Signs:  /63   Pulse 67   Temp 97.9 °F (36.6 °C) (Temporal)   Ht 170.2 cm (67\")   Wt 95.4 kg (210 lb 6.4 oz)   SpO2 98%   BMI 32.95 kg/m²   Estimated body mass index is 32.95 kg/m² as calculated from the following:    Height as of this encounter: 170.2 cm (67\").    Weight as of this encounter: 95.4 kg (210 lb 6.4 oz).             Physical Exam  Vitals and nursing note reviewed.   Constitutional:       General: He is not in acute distress.     Appearance: He is well-developed.   Cardiovascular:      Rate and Rhythm: Normal rate and regular rhythm.      Heart sounds: Normal heart sounds.   Pulmonary:      Effort: Pulmonary effort is normal.      Breath sounds: Normal breath sounds.   Musculoskeletal:      Cervical back: Normal range of motion.   Skin:     General: Skin is warm and dry.   Psychiatric:         Mood and Affect: Mood normal.        Result Review :  The following data was reviewed by: Sotero Torres MD on 03/03/2023:  Common labs    Common Labs 11/9/22 11/9/22 1/5/23 1/5/23 1/31/23    0932 0932 1354 1354    Glucose 96   160 (A)    BUN 14   18    Creatinine 1.24   1.17 1.30   Sodium 143   139    Potassium 4.5   3.7    Chloride 101   102    Calcium 9.4   9.0    Total Protein 7.7       Albumin 4.70       Total Bilirubin 0.4       Alkaline Phosphatase 86       AST (SGOT) 24       ALT (SGPT) 19       WBC   7.42     Hemoglobin   " 13.8     Hematocrit   42.2     Platelets   185     Total Cholesterol  136      Triglycerides  92      HDL Cholesterol  49      LDL Cholesterol   70      (A) Abnormal value       Comments are available for some flowsheets but are not being displayed.                        Assessment and Plan   Diagnoses and all orders for this visit:    1. Essential hypertension (Primary)  -     Hemoglobin A1c; Future  -     Comprehensive Metabolic Panel; Future  -     CBC & Differential; Future    2. Recurrent major depressive disorder, in full remission (HCC)  -     Hemoglobin A1c; Future  -     Comprehensive Metabolic Panel; Future  -     CBC & Differential; Future    3. Abnormal finding of blood chemistry, unspecified  -     Hemoglobin A1c; Future    Other orders  -     sertraline (ZOLOFT) 50 MG tablet; Take 1 tablet by mouth Daily.  Dispense: 90 tablet; Refill: 1      Hypertension.  Well-controlled.  Continue current medication.  I refilled his sertraline.  I will see him back in 3 months for recheck.    Caregiver stress.  Wife with depression and probable cognitive impairment that may be worsening.  She is also a patient of mine.  Counseling given in great detail, including possible need for assisted living in the future.       Follow Up   No follow-ups on file.  Patient was given instructions and counseling regarding his condition or for health maintenance advice. Please see specific information pulled into the AVS if appropriate.

## 2023-05-05 ENCOUNTER — HOSPITAL ENCOUNTER (OUTPATIENT)
Dept: CT IMAGING | Facility: HOSPITAL | Age: 83
Discharge: HOME | End: 2023-05-05
Payer: MEDICARE

## 2023-05-05 DIAGNOSIS — R91.1 LUNG NODULE: ICD-10-CM

## 2023-05-05 PROCEDURE — 71250 CT THORAX DX C-: CPT

## 2023-05-08 ENCOUNTER — TELEPHONE (OUTPATIENT)
Dept: CARDIOLOGY | Facility: CLINIC | Age: 83
End: 2023-05-08
Payer: MEDICARE

## 2023-05-08 DIAGNOSIS — R91.1 LUNG NODULE: Primary | ICD-10-CM

## 2023-05-08 NOTE — TELEPHONE ENCOUNTER
Reviewed CT results.  Left upper lobe nodule was stable.  Radiology is recommending another CT in 6 months.  I will place the order.

## 2023-05-26 RX ORDER — ROSUVASTATIN CALCIUM 5 MG/1
5 TABLET, COATED ORAL DAILY
Qty: 90 TABLET | Refills: 1 | Status: SHIPPED | OUTPATIENT
Start: 2023-05-26

## 2023-05-26 RX ORDER — HYDROCHLOROTHIAZIDE 12.5 MG/1
12.5 TABLET ORAL DAILY
Qty: 90 TABLET | Refills: 1 | Status: SHIPPED | OUTPATIENT
Start: 2023-05-26

## 2023-05-26 NOTE — TELEPHONE ENCOUNTER
Rx Refill Note  Requested Prescriptions     Pending Prescriptions Disp Refills   • rosuvastatin (CRESTOR) 5 MG tablet 90 tablet 3      Last office visit with prescribing clinician: Visit 3/3/23  Last telemedicine visit with prescribing clinician: Visit date not found   Next office visit with prescribing clinician: Visit date not found                         Would you like a call back once the refill request has been completed: [] Yes [] No    If the office needs to give you a call back, can they leave a voicemail: [] Yes [] No    Radha Sanchez MA  05/26/23, 07:17 EDT

## 2023-05-26 NOTE — TELEPHONE ENCOUNTER
Rx Refill Note  Requested Prescriptions     Pending Prescriptions Disp Refills   • hydroCHLOROthiazide (HYDRODIURIL) 12.5 MG tablet 90 tablet 1     Sig: Take 1 tablet by mouth Daily.      Last office visit with prescribing clinician: 3/3/2023   Last telemedicine visit with prescribing clinician: Visit date not found   Next office visit with prescribing clinician: 6/13/2023                         Would you like a call back once the refill request has been completed: [] Yes [] No    If the office needs to give you a call back, can they leave a voicemail: [] Yes [] No    Julai Han MA  05/26/23, 09:12 EDT

## 2023-06-06 DIAGNOSIS — R79.9 ABNORMAL FINDING OF BLOOD CHEMISTRY, UNSPECIFIED: ICD-10-CM

## 2023-06-06 DIAGNOSIS — I10 ESSENTIAL HYPERTENSION: Chronic | ICD-10-CM

## 2023-06-06 DIAGNOSIS — F33.42 RECURRENT MAJOR DEPRESSIVE DISORDER, IN FULL REMISSION: Chronic | ICD-10-CM

## 2023-06-07 LAB
ALBUMIN SERPL-MCNC: 4.5 G/DL (ref 3.5–5.2)
ALBUMIN/GLOB SERPL: 1.6 G/DL
ALP SERPL-CCNC: 77 U/L (ref 39–117)
ALT SERPL-CCNC: 23 U/L (ref 1–41)
AST SERPL-CCNC: 20 U/L (ref 1–40)
BASOPHILS # BLD AUTO: 0.05 10*3/MM3 (ref 0–0.2)
BASOPHILS NFR BLD AUTO: 0.6 % (ref 0–1.5)
BILIRUB SERPL-MCNC: 0.4 MG/DL (ref 0–1.2)
BUN SERPL-MCNC: 18 MG/DL (ref 8–23)
BUN/CREAT SERPL: 15 (ref 7–25)
CALCIUM SERPL-MCNC: 9.7 MG/DL (ref 8.6–10.5)
CHLORIDE SERPL-SCNC: 103 MMOL/L (ref 98–107)
CO2 SERPL-SCNC: 28.8 MMOL/L (ref 22–29)
CREAT SERPL-MCNC: 1.2 MG/DL (ref 0.76–1.27)
EGFRCR SERPLBLD CKD-EPI 2021: 60.4 ML/MIN/1.73
EOSINOPHIL # BLD AUTO: 0.38 10*3/MM3 (ref 0–0.4)
EOSINOPHIL NFR BLD AUTO: 4.7 % (ref 0.3–6.2)
ERYTHROCYTE [DISTWIDTH] IN BLOOD BY AUTOMATED COUNT: 12.4 % (ref 12.3–15.4)
GLOBULIN SER CALC-MCNC: 2.9 GM/DL
GLUCOSE SERPL-MCNC: 127 MG/DL (ref 65–99)
HBA1C MFR BLD: 5.8 % (ref 4.8–5.6)
HCT VFR BLD AUTO: 43.3 % (ref 37.5–51)
HGB BLD-MCNC: 14.7 G/DL (ref 13–17.7)
IMM GRANULOCYTES # BLD AUTO: 0.02 10*3/MM3 (ref 0–0.05)
IMM GRANULOCYTES NFR BLD AUTO: 0.2 % (ref 0–0.5)
LYMPHOCYTES # BLD AUTO: 2.39 10*3/MM3 (ref 0.7–3.1)
LYMPHOCYTES NFR BLD AUTO: 29.4 % (ref 19.6–45.3)
MCH RBC QN AUTO: 31.3 PG (ref 26.6–33)
MCHC RBC AUTO-ENTMCNC: 33.9 G/DL (ref 31.5–35.7)
MCV RBC AUTO: 92.3 FL (ref 79–97)
MONOCYTES # BLD AUTO: 0.68 10*3/MM3 (ref 0.1–0.9)
MONOCYTES NFR BLD AUTO: 8.4 % (ref 5–12)
NEUTROPHILS # BLD AUTO: 4.61 10*3/MM3 (ref 1.7–7)
NEUTROPHILS NFR BLD AUTO: 56.7 % (ref 42.7–76)
NRBC BLD AUTO-RTO: 0 /100 WBC (ref 0–0.2)
PLATELET # BLD AUTO: 219 10*3/MM3 (ref 140–450)
POTASSIUM SERPL-SCNC: 4.9 MMOL/L (ref 3.5–5.2)
PROT SERPL-MCNC: 7.4 G/DL (ref 6–8.5)
RBC # BLD AUTO: 4.69 10*6/MM3 (ref 4.14–5.8)
SODIUM SERPL-SCNC: 141 MMOL/L (ref 136–145)
WBC # BLD AUTO: 8.13 10*3/MM3 (ref 3.4–10.8)

## 2023-06-09 ENCOUNTER — OFFICE VISIT (OUTPATIENT)
Dept: CARDIOLOGY | Facility: CLINIC | Age: 83
End: 2023-06-09
Payer: MEDICARE

## 2023-06-09 VITALS
RESPIRATION RATE: 16 BRPM | DIASTOLIC BLOOD PRESSURE: 77 MMHG | HEIGHT: 67 IN | HEART RATE: 86 BPM | WEIGHT: 205 LBS | OXYGEN SATURATION: 99 % | BODY MASS INDEX: 32.18 KG/M2 | SYSTOLIC BLOOD PRESSURE: 130 MMHG

## 2023-06-09 DIAGNOSIS — I47.1 PAT (PAROXYSMAL ATRIAL TACHYCARDIA): ICD-10-CM

## 2023-06-09 DIAGNOSIS — E78.2 MIXED HYPERLIPIDEMIA: Chronic | ICD-10-CM

## 2023-06-09 DIAGNOSIS — Z95.0 PRESENCE OF CARDIAC PACEMAKER: ICD-10-CM

## 2023-06-09 DIAGNOSIS — I49.5 SICK SINUS SYNDROME: Primary | ICD-10-CM

## 2023-06-09 NOTE — PROGRESS NOTES
"      CARDIOLOGY    Michelle Garcia MD    ENCOUNTER DATE:  06/09/2023    Mikey Crawley / 82 y.o. / male        CHIEF COMPLAINT / REASON FOR OFFICE VISIT     Heart Problem (Sick sinus syndrome)      HISTORY OF PRESENT ILLNESS       HPI    Mikey Crawley is a 82 y.o. male     This is a patient previously followed by Dr. Rodriguez.  He has a history of sick sinus syndrome and is status post pacemaker.       Dictation on: 06/09/2023 11:47 AM by: MICHELLE GARCIA [[id number]]       REVIEW OF SYSTEMS     Review of Systems   Constitutional: Negative for chills, fever, weight gain and weight loss.   Cardiovascular:  Negative for leg swelling.   Respiratory:  Negative for cough, snoring and wheezing.    Hematologic/Lymphatic: Negative for bleeding problem. Does not bruise/bleed easily.   Skin:  Negative for color change.   Musculoskeletal:  Positive for joint pain. Negative for falls and myalgias.   Gastrointestinal:  Negative for melena.   Genitourinary:  Negative for hematuria.   Neurological:  Negative for excessive daytime sleepiness.   Psychiatric/Behavioral:  Negative for depression. The patient is not nervous/anxious.        VITAL SIGNS     Visit Vitals  /77 (BP Location: Left arm, Patient Position: Sitting, Cuff Size: Adult)   Pulse 86   Resp 16   Ht 170.2 cm (67\")   Wt 93 kg (205 lb)   SpO2 99%   BMI 32.11 kg/m²         Wt Readings from Last 3 Encounters:   06/09/23 93 kg (205 lb)   03/03/23 95.4 kg (210 lb 6.4 oz)   01/05/23 93.9 kg (207 lb)     Body mass index is 32.11 kg/m².      PHYSICAL EXAMINATION     Constitutional:       General: Not in acute distress.  Neck:      Vascular: No carotid bruit or JVD.   Pulmonary:      Effort: Pulmonary effort is normal.      Breath sounds: Normal breath sounds.   Cardiovascular:      Normal rate. Regular rhythm.      Murmurs: There is no murmur.   Psychiatric:         Mood and Affect: Mood and affect normal.         REVIEWED DATA       ECG 12 Lead    Date/Time: 6/9/2023 " 11:47 AM  Performed by: Michelle Garcia MD  Authorized by: Michelle Garcia MD   Comparison: compared with previous ECG from 11/8/2022  Similar to previous ECG  Rhythm comments: Atrially paced.    Clinical impression: normal ECG          Lipid Panel          11/9/2022    09:32   Lipid Panel   Total Cholesterol 136    Triglycerides 92    HDL Cholesterol 49    VLDL Cholesterol 17    LDL Cholesterol  70        Lab Results   Component Value Date    GLUCOSE 127 (H) 06/06/2023    BUN 18 06/06/2023    CREATININE 1.20 06/06/2023    EGFRRESULT 60.4 06/06/2023    EGFR 62.2 01/05/2023    BCR 15.0 06/06/2023    K 4.9 06/06/2023    CO2 28.8 06/06/2023    CALCIUM 9.7 06/06/2023    PROTENTOTREF 7.4 06/06/2023    ALBUMIN 4.5 06/06/2023    BILITOT 0.4 06/06/2023    AST 20 06/06/2023    ALT 23 06/06/2023       ASSESSMENT & PLAN      Diagnosis Plan   1. Sick sinus syndrome        2. Presence of cardiac pacemaker        3. PAT (paroxysmal atrial tachycardia)        4. Mixed hyperlipidemia              1.  Sick sinus syndrome status post dual-chamber pacemaker.  Check pacemaker today.  2.  Hypertension.  Well controlled.  3.  Chronic kidney disease.  4.  Dizzy spells.  I cut back on his diuretic previously.     Keep follow-up appointments with pacemaker department.  Follow-up with me or my nurse practitioner in 1 year.    Orders Placed This Encounter   Procedures    ECG 12 Lead     This order was created via procedure documentation     Order Specific Question:   Release to patient     Answer:   Routine Release           MEDICATIONS         Discharge Medications            Accurate as of June 9, 2023 11:48 AM. If you have any questions, ask your nurse or doctor.                Changes to Medications        Instructions Start Date   atenolol 50 MG tablet  Commonly known as: TENORMIN  What changed:   how much to take  how to take this  when to take this  Another medication with the same name was removed. Continue taking this medication,  and follow the directions you see here.   Take one half of a tablet in the a.m. and 1 full tablet in the p.m.             Continue These Medications        Instructions Start Date   amlodipine-olmesartan 5-40 MG per tablet  Commonly known as: AMAYA   TAKE 1 TABLET DAILY      finasteride 5 MG tablet  Commonly known as: PROSCAR   5 mg, Oral, Daily      hydroCHLOROthiazide 12.5 MG tablet  Commonly known as: HYDRODIURIL   12.5 mg, Oral, Daily      rosuvastatin 5 MG tablet  Commonly known as: CRESTOR   5 mg, Oral, Daily      sertraline 50 MG tablet  Commonly known as: ZOLOFT   50 mg, Oral, Daily      tamsulosin 0.4 MG capsule 24 hr capsule  Commonly known as: FLOMAX   2 capsules, Oral, Nightly                 Michelle Garcia MD  06/09/23  11:48 EDT    Part of this note may be an electronic transcription/translation of spoken language to printed text using the Dragon dictation system.

## 2023-06-09 NOTE — PROGRESS NOTES
His pacemaker checks have been unremarkable.  He denies chest pain, shortness of breath.  He has had some decrease in his exercise capacity which he associates with age.

## 2023-06-13 ENCOUNTER — OFFICE VISIT (OUTPATIENT)
Dept: FAMILY MEDICINE CLINIC | Facility: CLINIC | Age: 83
End: 2023-06-13
Payer: MEDICARE

## 2023-06-13 VITALS
BODY MASS INDEX: 32.55 KG/M2 | WEIGHT: 207.4 LBS | TEMPERATURE: 97.3 F | HEART RATE: 73 BPM | HEIGHT: 67 IN | OXYGEN SATURATION: 95 % | DIASTOLIC BLOOD PRESSURE: 77 MMHG | SYSTOLIC BLOOD PRESSURE: 140 MMHG

## 2023-06-13 DIAGNOSIS — I10 ESSENTIAL HYPERTENSION: Primary | ICD-10-CM

## 2023-06-13 DIAGNOSIS — F33.42 RECURRENT MAJOR DEPRESSIVE DISORDER, IN FULL REMISSION: ICD-10-CM

## 2023-06-13 DIAGNOSIS — R79.9 ABNORMAL FINDING OF BLOOD CHEMISTRY, UNSPECIFIED: ICD-10-CM

## 2023-06-13 DIAGNOSIS — E78.2 MIXED HYPERLIPIDEMIA: ICD-10-CM

## 2023-06-13 PROCEDURE — 99214 OFFICE O/P EST MOD 30 MIN: CPT | Performed by: FAMILY MEDICINE

## 2023-06-13 PROCEDURE — 3078F DIAST BP <80 MM HG: CPT | Performed by: FAMILY MEDICINE

## 2023-06-13 PROCEDURE — 3077F SYST BP >= 140 MM HG: CPT | Performed by: FAMILY MEDICINE

## 2023-06-13 NOTE — PROGRESS NOTES
"Chief Complaint  Hypertension    Subjective        Mikey Crawley presents to [facility]  History of Present Illness    Hypertension follow-up.  Continues amlodipine olmesartan combo, atenolol, hydrochlorothiazide.  Blood pressure is slightly high today but running well otherwise.  He was having some dizzy episodes but none recently.  He saw his cardiologist recently for recheck on his pacemaker, reportedly doing well.    Impaired fasting glucose.  However most recently glucose was 127 but he had breakfast that morning.  A1c was 5.8%.    Hyperlipidemia.  He continues on rosuvastatin 5 mg day without adverse effect.    Major depression.  In remission.  Caregiver stress.  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  And some emotional issues.  He continues on the sertraline 50 mg a day maintenance therapy without issue.  The medication is helping him.    Objective   Vital Signs:  /77   Pulse 73   Temp 97.3 °F (36.3 °C) (Temporal)   Ht 170.2 cm (67\")   Wt 94.1 kg (207 lb 6.4 oz)   SpO2 95%   BMI 32.48 kg/m²   Estimated body mass index is 32.48 kg/m² as calculated from the following:    Height as of this encounter: 170.2 cm (67\").    Weight as of this encounter: 94.1 kg (207 lb 6.4 oz).             Physical Exam  Vitals and nursing note reviewed.   Constitutional:       General: He is not in acute distress.     Appearance: He is well-developed.   Cardiovascular:      Rate and Rhythm: Normal rate.      Heart sounds: Normal heart sounds.   Pulmonary:      Effort: Pulmonary effort is normal.      Breath sounds: Normal breath sounds.   Skin:     General: Skin is warm and dry.   Psychiatric:         Mood and Affect: Mood normal.      Result Review :  The following data was reviewed by: Sotero Torres MD on 06/13/2023:  Common labs          1/5/2023    13:54 1/31/2023    16:52 6/6/2023    11:20   Common Labs   Glucose 160   127    BUN 18   18    Creatinine 1.17  1.30  1.20  "   Sodium 139   141    Potassium 3.7   4.9    Chloride 102   103    Calcium 9.0   9.7    Total Protein   7.4    Albumin   4.5    Total Bilirubin   0.4    Alkaline Phosphatase   77    AST (SGOT)   20    ALT (SGPT)   23    WBC 7.42   8.13    Hemoglobin 13.8   14.7    Hematocrit 42.2   43.3    Platelets 185   219    Hemoglobin A1C   5.80                   Assessment and Plan   Diagnoses and all orders for this visit:    1. Essential hypertension (Primary)  -     Hemoglobin A1c; Future  -     CBC & Differential; Future  -     Comprehensive Metabolic Panel; Future  -     Lipid Panel; Future  -     TSH Rfx On Abnormal To Free T4; Future    2. Recurrent major depressive disorder, in full remission  -     Hemoglobin A1c; Future  -     CBC & Differential; Future  -     Comprehensive Metabolic Panel; Future  -     Lipid Panel; Future  -     TSH Rfx On Abnormal To Free T4; Future    3. Abnormal finding of blood chemistry, unspecified  -     Hemoglobin A1c; Future  -     CBC & Differential; Future  -     Comprehensive Metabolic Panel; Future  -     Lipid Panel; Future  -     TSH Rfx On Abnormal To Free T4; Future    4. Mixed hyperlipidemia  -     Hemoglobin A1c; Future  -     CBC & Differential; Future  -     Comprehensive Metabolic Panel; Future  -     Lipid Panel; Future  -     TSH Rfx On Abnormal To Free T4; Future      Hypertension.  Overall well controlled.    Major depression.  In remission.  Continues on maintenance sertraline.  Given his psychosocial stressors, especially his wife's illness, I would recommend he continue the sertraline.  Benefits outweigh for risks by far.  The only risk at this point would be perhaps increase chances of GI bleed given his age and the SSRI use.  However he is not on aspirin or blood thinners.  And does not take NSAIDs.    Hyperlipidemia.  Continue rosuvastatin.  Also has sick sinus syndrome with pacemaker, followed by cardiology.    Borderline elevated A1c at 5.8%.  Nonfasting glucose  127.  At this time no evidence of diabetes.  Possible early prediabetes.  Will monitor.    Follow-up in about 6 months for annual Medicare wellness visit with lab work prior       Follow Up   No follow-ups on file.  Patient was given instructions and counseling regarding his condition or for health maintenance advice. Please see specific information pulled into the AVS if appropriate.

## 2023-07-07 ENCOUNTER — TELEPHONE (OUTPATIENT)
Dept: FAMILY MEDICINE CLINIC | Facility: CLINIC | Age: 83
End: 2023-07-07

## 2023-07-07 ENCOUNTER — OFFICE VISIT (OUTPATIENT)
Dept: FAMILY MEDICINE CLINIC | Facility: CLINIC | Age: 83
End: 2023-07-07
Payer: MEDICARE

## 2023-07-07 VITALS
TEMPERATURE: 97.3 F | HEART RATE: 68 BPM | OXYGEN SATURATION: 95 % | HEIGHT: 67 IN | RESPIRATION RATE: 18 BRPM | SYSTOLIC BLOOD PRESSURE: 128 MMHG | WEIGHT: 193 LBS | DIASTOLIC BLOOD PRESSURE: 70 MMHG | BODY MASS INDEX: 30.29 KG/M2

## 2023-07-07 DIAGNOSIS — M54.2 NECK PAIN: ICD-10-CM

## 2023-07-07 DIAGNOSIS — M25.512 ACUTE PAIN OF LEFT SHOULDER: Primary | ICD-10-CM

## 2023-07-07 PROCEDURE — 3074F SYST BP LT 130 MM HG: CPT | Performed by: FAMILY MEDICINE

## 2023-07-07 PROCEDURE — 3078F DIAST BP <80 MM HG: CPT | Performed by: FAMILY MEDICINE

## 2023-07-07 PROCEDURE — 99214 OFFICE O/P EST MOD 30 MIN: CPT | Performed by: FAMILY MEDICINE

## 2023-07-07 RX ORDER — PREDNISONE 20 MG/1
40 TABLET ORAL DAILY
Qty: 10 TABLET | Refills: 0 | Status: SHIPPED | OUTPATIENT
Start: 2023-07-07 | End: 2023-07-12

## 2023-07-07 NOTE — TELEPHONE ENCOUNTER
[facility] staff attempted to follow warm transfer process and was unsuccessful     Caller: Rosemarie Crawley    Relationship to patient: Emergency Contact    Best call back number: [phone]     Patient is needing: PATENT IS REQUESTING AN APPOINTMENT WITH DR MARGARITA ASCENCIO. [facility] OFFERED NEXT APPOINTMENT WHICH IS 7/12/23 BUT ROSEMARIE STATES THAT IT WILL NOT WORK AND HE NEEDS TO BE SEEN SOONER. PATIENT IS HAVING TROUBLE WALKING AND IS HAVING SHOULDER PAIN    PLEASE CALL AND ADVISE

## 2023-07-07 NOTE — PROGRESS NOTES
"Chief Complaint  Chief Complaint   Patient presents with    Neck Pain     Pt c/o L side neck pain x 2 weeks        Subjective    History of Present Illness        Mikey Crawley presents to [facility] for   Neck Pain   This is a new problem. He has tried acetaminophen, ice, NSAIDs and heat for the symptoms.   Shoulder Injury   The incident occurred at home. The left shoulder is affected. The injury mechanism is unknown. The quality of the pain is described as burning. The pain is severe. Associated symptoms include muscle weakness. The symptoms are aggravated by movement and overhead lifting. He has tried NSAIDs, acetaminophen, heat and ice for the symptoms. The treatment provided mild relief.      Objective   Vital Signs:   Visit Vitals  /70   Pulse 68   Temp 97.3 øF (36.3 øC)   Resp 18   Ht 170.2 cm (67\")   Wt 87.5 kg (193 lb)   SpO2 95%   BMI 30.23 kg/mý          Physical Exam  Vitals reviewed.   Constitutional:       Appearance: He is well-developed.   HENT:      Head: Normocephalic.      Right Ear: External ear normal.      Left Ear: External ear normal.      Nose: Nose normal.   Eyes:      Conjunctiva/sclera: Conjunctivae normal.   Cardiovascular:      Rate and Rhythm: Normal rate and regular rhythm.   Pulmonary:      Effort: Pulmonary effort is normal.      Breath sounds: Normal breath sounds.   Musculoskeletal:         General: Normal range of motion.      Cervical back: Normal range of motion and neck supple.   Skin:     General: Skin is warm and dry.      Capillary Refill: Capillary refill takes less than 2 seconds.   Neurological:      Mental Status: He is alert and oriented to person, place, and time.          Result Review :                    Assessment and Plan      Diagnoses and all orders for this visit:    1. Acute pain of left shoulder (Primary)  Assessment & Plan:  X-ray of left shoulder ordered and results reviewed.  Patient was given a prescription of " prednisone to help treat his symptoms.  Patient was encouraged to return to clinic if symptoms do not improved    Orders:  -     predniSONE (DELTASONE) 20 MG tablet; Take 2 tablets by mouth Daily for 5 days.  Dispense: 10 tablet; Refill: 0  -     XR Shoulder 2+ View Left  -     Ambulatory Referral to Sports Medicine    2. Neck pain  Assessment & Plan:  Patient was given a prescription for prednisone.  Patient was referred to sports medicine for further patient treatment    Orders:  -     predniSONE (DELTASONE) 20 MG tablet; Take 2 tablets by mouth Daily for 5 days.  Dispense: 10 tablet; Refill: 0  -     Ambulatory Referral to Sports Medicine             Follow Up   No follow-ups on file.  Patient was given instructions and counseling regarding his condition or for health maintenance advice. Please see specific information pulled into the AVS if appropriate.

## 2023-08-02 ENCOUNTER — OFFICE VISIT (OUTPATIENT)
Dept: SPORTS MEDICINE | Facility: CLINIC | Age: 83
End: 2023-08-02
Payer: MEDICARE

## 2023-08-02 ENCOUNTER — LAB (OUTPATIENT)
Dept: LAB | Facility: HOSPITAL | Age: 83
End: 2023-08-02
Payer: MEDICARE

## 2023-08-02 VITALS
SYSTOLIC BLOOD PRESSURE: 138 MMHG | HEIGHT: 67 IN | BODY MASS INDEX: 32.02 KG/M2 | DIASTOLIC BLOOD PRESSURE: 68 MMHG | HEART RATE: 65 BPM | WEIGHT: 204 LBS | OXYGEN SATURATION: 98 %

## 2023-08-02 DIAGNOSIS — M25.511 ACUTE PAIN OF RIGHT SHOULDER: ICD-10-CM

## 2023-08-02 DIAGNOSIS — M10.9 ACUTE GOUT INVOLVING TOE OF RIGHT FOOT, UNSPECIFIED CAUSE: Primary | ICD-10-CM

## 2023-08-02 LAB
CRP SERPL-MCNC: 0.94 MG/DL (ref 0–0.5)
ERYTHROCYTE [SEDIMENTATION RATE] IN BLOOD: 10 MM/HR (ref 0–20)
URATE SERPL-MCNC: 6.5 MG/DL (ref 3.4–7)

## 2023-08-02 PROCEDURE — 86140 C-REACTIVE PROTEIN: CPT | Performed by: FAMILY MEDICINE

## 2023-08-02 PROCEDURE — 36415 COLL VENOUS BLD VENIPUNCTURE: CPT | Performed by: FAMILY MEDICINE

## 2023-08-02 PROCEDURE — 99214 OFFICE O/P EST MOD 30 MIN: CPT | Performed by: FAMILY MEDICINE

## 2023-08-02 PROCEDURE — 84550 ASSAY OF BLOOD/URIC ACID: CPT | Performed by: FAMILY MEDICINE

## 2023-08-02 PROCEDURE — 3078F DIAST BP <80 MM HG: CPT | Performed by: FAMILY MEDICINE

## 2023-08-02 PROCEDURE — 3075F SYST BP GE 130 - 139MM HG: CPT | Performed by: FAMILY MEDICINE

## 2023-08-02 PROCEDURE — 85652 RBC SED RATE AUTOMATED: CPT | Performed by: FAMILY MEDICINE

## 2023-08-02 RX ORDER — PREDNISONE 10 MG/1
TABLET ORAL
Qty: 48 TABLET | Refills: 0 | Status: SHIPPED | OUTPATIENT
Start: 2023-08-02

## 2023-08-02 RX ORDER — PREDNISONE 10 MG/1
TABLET ORAL
Qty: 48 TABLET | Refills: 0 | Status: SHIPPED | OUTPATIENT
Start: 2023-08-02 | End: 2023-08-02

## 2023-08-10 PROCEDURE — 93294 REM INTERROG EVL PM/LDLS PM: CPT | Performed by: INTERNAL MEDICINE

## 2023-08-10 PROCEDURE — 93296 REM INTERROG EVL PM/IDS: CPT | Performed by: INTERNAL MEDICINE

## 2023-08-13 NOTE — ASSESSMENT & PLAN NOTE
Patient was given a prescription for prednisone.  Patient was referred to sports medicine for further patient treatment

## 2023-08-13 NOTE — ASSESSMENT & PLAN NOTE
X-ray of left shoulder ordered and results reviewed.  Patient was given a prescription of prednisone to help treat his symptoms.  Patient was encouraged to return to clinic if symptoms do not improved

## 2023-08-16 RX ORDER — ATENOLOL 50 MG/1
25 TABLET ORAL
Qty: 45 TABLET | Refills: 2 | Status: SHIPPED | OUTPATIENT
Start: 2023-08-16

## 2023-09-27 RX ORDER — HYDROCHLOROTHIAZIDE 12.5 MG/1
12.5 TABLET ORAL DAILY
Qty: 90 TABLET | Refills: 1 | Status: SHIPPED | OUTPATIENT
Start: 2023-09-27

## 2023-09-27 NOTE — TELEPHONE ENCOUNTER
Rx Refill Note  Requested Prescriptions     Pending Prescriptions Disp Refills    hydroCHLOROthiazide (HYDRODIURIL) 12.5 MG tablet [Pharmacy Med Name: hydroCHLOROthiazide 12.5 MG Oral Tablet] 90 tablet 3     Sig: TAKE 1 TABLET BY MOUTH DAILY      Last office visit with prescribing clinician: 6/13/2023   Last telemedicine visit with prescribing clinician: Visit date not found   Next office visit with prescribing clinician: 11/27/2023                         Would you like a call back once the refill request has been completed: [] Yes [] No    If the office needs to give you a call back, can they leave a voicemail: [] Yes [] No    Mann Beckham MA  09/27/23, 08:16 EDT

## 2023-11-08 ENCOUNTER — HOSPITAL ENCOUNTER (OUTPATIENT)
Dept: CT IMAGING | Facility: HOSPITAL | Age: 83
Discharge: HOME | End: 2023-11-08
Admitting: INTERNAL MEDICINE
Payer: MEDICARE

## 2023-11-08 DIAGNOSIS — R91.1 LUNG NODULE: ICD-10-CM

## 2023-11-08 PROCEDURE — 71250 CT THORAX DX C-: CPT

## 2023-11-09 RX ORDER — ROSUVASTATIN CALCIUM 5 MG/1
5 TABLET, COATED ORAL DAILY
Qty: 90 TABLET | Refills: 1 | Status: SHIPPED | OUTPATIENT
Start: 2023-11-09

## 2023-11-09 NOTE — TELEPHONE ENCOUNTER
Rx Refill Note  Requested Prescriptions     Pending Prescriptions Disp Refills    rosuvastatin (CRESTOR) 5 MG tablet [Pharmacy Med Name: Rosuvastatin Calcium 5 MG Oral Tablet] 90 tablet 1     Sig: TAKE 1 TABLET BY MOUTH DAILY      Last office visit with prescribing clinician: 6/13/2023   Last telemedicine visit with prescribing clinician: Visit date not found   Next office visit with prescribing clinician: 11/27/2023                         Would you like a call back once the refill request has been completed: [] Yes [] No    If the office needs to give you a call back, can they leave a voicemail: [] Yes [] No    Jyoti Guerin  11/09/23, 11:18 EST

## 2023-11-10 ENCOUNTER — TELEPHONE (OUTPATIENT)
Dept: CARDIOLOGY | Facility: CLINIC | Age: 83
End: 2023-11-10
Payer: MEDICARE

## 2023-11-10 NOTE — TELEPHONE ENCOUNTER
Notified patient of results/recommendations. Patient verbalized understanding. He wanted me to pass along that he is still struggling with dizziness. He said it seems to occur more with position changes or when he bends over. He said he had an episode back at the end of September where he actually passed out d/t it. He has not checked his BP or HR when it occurs and he does not know what it has been running on average.     Daisy Finch RN  Triage [facility] [place]

## 2023-11-10 NOTE — TELEPHONE ENCOUNTER
I wonder if his blood pressure is getting low at times.  I would have him perform twice daily blood pressure and pulse log and call that in next week.  Thank you

## 2023-11-10 NOTE — TELEPHONE ENCOUNTER
Please inform patient CT shows stable lung nodule.  Needs to keep next scheduled follow-up appointment at this time

## 2023-11-10 NOTE — TELEPHONE ENCOUNTER
Notified patient of recommendations. Patient verbalized understanding.    Daisy Finch RN  [place] [facility] [place]

## 2023-11-17 ENCOUNTER — PATIENT MESSAGE (OUTPATIENT)
Age: 83
End: 2023-11-17
Payer: MEDICARE

## 2023-11-20 ENCOUNTER — TELEPHONE (OUTPATIENT)
Dept: CARDIOLOGY | Facility: CLINIC | Age: 83
End: 2023-11-20
Payer: MEDICARE

## 2023-11-20 DIAGNOSIS — R79.9 ABNORMAL FINDING OF BLOOD CHEMISTRY, UNSPECIFIED: ICD-10-CM

## 2023-11-20 DIAGNOSIS — E78.2 MIXED HYPERLIPIDEMIA: ICD-10-CM

## 2023-11-20 DIAGNOSIS — I10 ESSENTIAL HYPERTENSION: ICD-10-CM

## 2023-11-20 DIAGNOSIS — F33.42 RECURRENT MAJOR DEPRESSIVE DISORDER, IN FULL REMISSION: ICD-10-CM

## 2023-11-20 NOTE — TELEPHONE ENCOUNTER
"    Caller: Mikey Crawley \"Gary\"    Relationship to patient: Self    Best call back number: [phone]    Patient is needing: BLOOD PRESSURE REPORT FOR A WEEK    11.11.23  MORNING 167/81 PULSE 77, EVENING 154/66 PULSE 69  11.12.23  MORNING 151/70 PULSE 64, EVENING 158/71 PULSE 65  11.13.23 MORNING 165/79 PULSE 67, EVENING 165/82 PULSE 86  11.14.23 MORNING 150/68 PULSE 68, EVENING 129/62 PULSE 71  11.15.23 MORNING 148/65 PULSE 64, EVENING 150/77 PULSE 63  11.16.23 MORNING 141/61 PULSE 61, EVENING 149/69 PULSE 60  11.17.23 MORNING 146/68 PULSE 67, EVENING 152/68 PULSE 66  11.18.23 MORNING 146/66 PULSE 65, EVENING 179/82 PULSE 65  11.19.23 MORN /73 PULSE 64, EVENING 162/66 PULSE 65            "

## 2023-11-21 LAB
ALBUMIN SERPL-MCNC: 4.6 G/DL (ref 3.5–5.2)
ALBUMIN/GLOB SERPL: 1.9 G/DL
ALP SERPL-CCNC: 74 U/L (ref 39–117)
ALT SERPL-CCNC: 18 U/L (ref 1–41)
AST SERPL-CCNC: 17 U/L (ref 1–40)
BASOPHILS # BLD AUTO: 0.07 10*3/MM3 (ref 0–0.2)
BASOPHILS NFR BLD AUTO: 0.7 % (ref 0–1.5)
BILIRUB SERPL-MCNC: 0.6 MG/DL (ref 0–1.2)
BUN SERPL-MCNC: 11 MG/DL (ref 8–23)
BUN/CREAT SERPL: 11.3 (ref 7–25)
CALCIUM SERPL-MCNC: 9.5 MG/DL (ref 8.6–10.5)
CHLORIDE SERPL-SCNC: 104 MMOL/L (ref 98–107)
CHOLEST SERPL-MCNC: 130 MG/DL (ref 0–200)
CO2 SERPL-SCNC: 29.8 MMOL/L (ref 22–29)
CREAT SERPL-MCNC: 0.97 MG/DL (ref 0.76–1.27)
EGFRCR SERPLBLD CKD-EPI 2021: 77.5 ML/MIN/1.73
EOSINOPHIL # BLD AUTO: 0.35 10*3/MM3 (ref 0–0.4)
EOSINOPHIL NFR BLD AUTO: 3.7 % (ref 0.3–6.2)
ERYTHROCYTE [DISTWIDTH] IN BLOOD BY AUTOMATED COUNT: 12.6 % (ref 12.3–15.4)
GLOBULIN SER CALC-MCNC: 2.4 GM/DL
GLUCOSE SERPL-MCNC: 129 MG/DL (ref 65–99)
HBA1C MFR BLD: 5.7 % (ref 4.8–5.6)
HCT VFR BLD AUTO: 42.3 % (ref 37.5–51)
HDLC SERPL-MCNC: 40 MG/DL (ref 40–60)
HGB BLD-MCNC: 14.3 G/DL (ref 13–17.7)
IMM GRANULOCYTES # BLD AUTO: 0.03 10*3/MM3 (ref 0–0.05)
IMM GRANULOCYTES NFR BLD AUTO: 0.3 % (ref 0–0.5)
LDLC SERPL CALC-MCNC: 64 MG/DL (ref 0–100)
LYMPHOCYTES # BLD AUTO: 2.54 10*3/MM3 (ref 0.7–3.1)
LYMPHOCYTES NFR BLD AUTO: 27 % (ref 19.6–45.3)
MCH RBC QN AUTO: 31 PG (ref 26.6–33)
MCHC RBC AUTO-ENTMCNC: 33.8 G/DL (ref 31.5–35.7)
MCV RBC AUTO: 91.6 FL (ref 79–97)
MONOCYTES # BLD AUTO: 0.72 10*3/MM3 (ref 0.1–0.9)
MONOCYTES NFR BLD AUTO: 7.7 % (ref 5–12)
NEUTROPHILS # BLD AUTO: 5.7 10*3/MM3 (ref 1.7–7)
NEUTROPHILS NFR BLD AUTO: 60.6 % (ref 42.7–76)
NRBC BLD AUTO-RTO: 0 /100 WBC (ref 0–0.2)
PLATELET # BLD AUTO: 200 10*3/MM3 (ref 140–450)
POTASSIUM SERPL-SCNC: 4.2 MMOL/L (ref 3.5–5.2)
PROT SERPL-MCNC: 7 G/DL (ref 6–8.5)
RBC # BLD AUTO: 4.62 10*6/MM3 (ref 4.14–5.8)
SODIUM SERPL-SCNC: 142 MMOL/L (ref 136–145)
TRIGL SERPL-MCNC: 150 MG/DL (ref 0–150)
VLDLC SERPL CALC-MCNC: 26 MG/DL (ref 5–40)
WBC # BLD AUTO: 9.41 10*3/MM3 (ref 3.4–10.8)

## 2023-11-21 RX ORDER — ATENOLOL 50 MG/1
50 TABLET ORAL
Start: 2023-11-21

## 2023-11-21 NOTE — TELEPHONE ENCOUNTER
I spoke with pt and reviewed his medications:    - Amlodipine/olmesartan 5/40 mg QD  - Atenolol 25 mg QD  - HCTZ 12.5 mg QD    Pt will begin taking full tablet, 50 mg, of atenolol.  He does not need a refill.  He will bring an updated BP log with him to his PCP appt.    Thank you,    Evette ENNIS RN  Triage [facility] [place]  11/21/23 09:03 EST

## 2023-11-21 NOTE — TELEPHONE ENCOUNTER
His blood pressure is too high on average.  Please review his medication list as reflected an update, if needed.  It appears he is taking half tablet of atenolol in the morning so I would like to increase that to a full tablet and have him continue hydrochlorothiazide, amlodipine/olmesartan as listed.  I would have him take an up-to-date blood pressure log to his PCP next week when he follows up. Let me know if he will need a new prescription for atenolol 50 mg

## 2023-11-27 ENCOUNTER — OFFICE VISIT (OUTPATIENT)
Dept: FAMILY MEDICINE CLINIC | Facility: CLINIC | Age: 83
End: 2023-11-27
Payer: MEDICARE

## 2023-11-27 DIAGNOSIS — Z00.00 MEDICARE ANNUAL WELLNESS VISIT, SUBSEQUENT: Primary | ICD-10-CM

## 2023-11-27 DIAGNOSIS — I10 ESSENTIAL HYPERTENSION: ICD-10-CM

## 2023-11-27 DIAGNOSIS — R73.01 IMPAIRED FASTING GLUCOSE: ICD-10-CM

## 2023-11-27 DIAGNOSIS — E78.2 MIXED HYPERLIPIDEMIA: ICD-10-CM

## 2023-11-27 DIAGNOSIS — M15.9 PRIMARY OSTEOARTHRITIS INVOLVING MULTIPLE JOINTS: Chronic | ICD-10-CM

## 2023-11-27 DIAGNOSIS — F33.42 RECURRENT MAJOR DEPRESSIVE DISORDER, IN FULL REMISSION: ICD-10-CM

## 2023-11-27 RX ORDER — CELECOXIB 200 MG/1
200 CAPSULE ORAL DAILY PRN
Qty: 30 CAPSULE | Refills: 2 | Status: SHIPPED | OUTPATIENT
Start: 2023-11-27

## 2023-11-27 RX ORDER — TRIAMCINOLONE ACETONIDE 1 MG/G
1 CREAM TOPICAL 2 TIMES DAILY
COMMUNITY
Start: 2023-08-30

## 2023-11-27 NOTE — PROGRESS NOTES
The ABCs of the Annual Wellness Visit  Subsequent Medicare Wellness Visit    Subjective    Mikey Crawley is a 83 y.o. male who presents for a Subsequent Medicare Wellness Visit.    The following portions of the patient's history were reviewed and   updated as appropriate: allergies, current medications, past family history, past medical history, past social history, past surgical history, and problem list.    Compared to one year ago, the patient feels his physical   health is worse.    Compared to one year ago, the patient feels his mental   health is worse.    Recent Hospitalizations:  He was not admitted to the hospital during the last year.       Current Medical Providers:  Patient Care Team:  Sotero Torres MD as PCP - General (Family Medicine)  Dima Alvarado MD as Consulting Physician (Orthopedic Surgery)  Jacinto Maldonado MD as Consulting Physician (Orthopedic Surgery)  Michelle Garcia MD as Consulting Physician (Cardiology)    Outpatient Medications Prior to Visit   Medication Sig Dispense Refill    amlodipine-olmesartan (AMAYA) 5-40 MG per tablet TAKE 1 TABLET DAILY (Patient taking differently: Take 1 tablet by mouth Daily.) 90 tablet 3    atenolol (TENORMIN) 50 MG tablet Take 1 tablet by mouth Every Morning.      finasteride (PROSCAR) 5 MG tablet Take 1 tablet by mouth Daily.      hydroCHLOROthiazide (HYDRODIURIL) 12.5 MG tablet TAKE 1 TABLET BY MOUTH DAILY 90 tablet 1    rosuvastatin (CRESTOR) 5 MG tablet TAKE 1 TABLET BY MOUTH DAILY 90 tablet 1    sertraline (ZOLOFT) 50 MG tablet TAKE 1 TABLET BY MOUTH DAILY 90 tablet 1    tamsulosin (FLOMAX) 0.4 MG capsule 24 hr capsule Take 2 capsules by mouth Every Night.      triamcinolone (KENALOG) 0.1 % cream Apply 1 application  topically to the appropriate area as directed 2 (Two) Times a Day.      predniSONE (DELTASONE) 10 MG tablet 6 tabs qd x 3 d, 4 tabs qd x 3 d, 3 tabs qd x 3 d, 2 tabs qd x 3 d,1 tab qd x 3 d 48 tablet 0     Facility-Administered  "Medications Prior to Visit   Medication Dose Route Frequency Provider Last Rate Last Admin    regadenoson (LEXISCAN) injection 0.4 mg  0.4 mg Intravenous Once in imaging Adelina Alvarez, MELYSSA, APRN        technetium tetrofosmin (Tc-MYOVIEW) injection 1 dose  1 dose Intravenous Once in imaging Adelina Alvarez DNP, APRN           No opioid medication identified on active medication list. I have reviewed chart for other potential  high risk medication/s and harmful drug interactions in the elderly.        Aspirin is not on active medication list.  Aspirin use is not indicated based on review of current medical condition/s. Risk of harm outweighs potential benefits.  .    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  ACP discussion was held with the patient during this visit. Patient has an advance directive (not in EMR), copy requested.     Objective    There were no vitals filed for this visit.  Estimated body mass index is 31.95 kg/m² as calculated from the following:    Height as of 8/2/23: 170.2 cm (67\").    Weight as of 8/2/23: 92.5 kg (204 lb).    BMI is >= 30 and <35. (Class 1 Obesity). The following options were offered after discussion;: none (medical contraindication)      Does the patient have evidence of cognitive impairment? No    Lab Results   Component Value " Date    CHLPL 130 2023    TRIG 150 2023    HDL 40 2023    LDL 64 2023    VLDL 26 2023    HGBA1C 5.70 (H) 2023        HEALTH RISK ASSESSMENT    Smoking Status:  Social History     Tobacco Use   Smoking Status Former    Types: Pipe, Cigars   Smokeless Tobacco Never   Tobacco Comments    never smoked cigarets only cigars now and then     Alcohol Consumption:  Social History     Substance and Sexual Activity   Alcohol Use Not Currently    Comment: seldom drink     Fall Risk Screen:    MENGADI Fall Risk Assessment has not been completed.    Depression Screenin/27/2023    11:17 AM   PHQ-2/PHQ-9 Depression Screening   Little Interest or Pleasure in Doing Things 0-->not at all   Feeling Down, Depressed or Hopeless 0-->not at all   PHQ-9: Brief Depression Severity Measure Score 0       Health Habits and Functional and Cognitive Screenin/27/2023    11:00 AM   Functional & Cognitive Status   Do you have difficulty preparing food and eating? No   Do you have difficulty bathing yourself, getting dressed or grooming yourself? No   Do you have difficulty using the toilet? No   Do you have difficulty moving around from place to place? No   Do you have trouble with steps or getting out of a bed or a chair? Yes   Current Diet Well Balanced Diet   Dental Exam Up to date   Eye Exam Up to date   Exercise (times per week) 0 times per week   Current Exercises Include No Regular Exercise   Do you need help using the phone?  No   Are you deaf or do you have serious difficulty hearing?  No   Do you need help to go to places out of walking distance? No   Do you need help shopping? No   Do you need help preparing meals?  No   Do you need help with housework?  No   Do you need help with laundry? No   Do you need help taking your medications? No   Do you need help managing money? No   Do you ever drive or ride in a car without wearing a seat belt? No   Have you felt unusual stress, anger or  loneliness in the last month? Yes   Who do you live with? Spouse   If you need help, do you have trouble finding someone available to you? No   Do you have difficulty concentrating, remembering or making decisions? Yes       Age-appropriate Screening Schedule:  Refer to the list below for future screening recommendations based on patient's age, sex and/or medical conditions. Orders for these recommended tests are listed in the plan section. The patient has been provided with a written plan.    Health Maintenance   Topic Date Due    ZOSTER VACCINE (2 of 3) 01/28/2014    ANNUAL WELLNESS VISIT  11/16/2023    BMI FOLLOWUP  11/16/2023    LIPID PANEL  11/20/2024    TDAP/TD VACCINES (2 - Td or Tdap) 09/28/2028    COVID-19 Vaccine  Completed    INFLUENZA VACCINE  Completed    Pneumococcal Vaccine 65+  Completed    COLORECTAL CANCER SCREENING  Discontinued                  CMS Preventative Services Quick Reference  Risk Factors Identified During Encounter  Chronic Pain: NSAIDs per medication orders.  Depression/Dysphoria: Current medication is effective, no change recommended  Immunizations Discussed/Encouraged: Prevnar 20 (Pneumococcal 20-valent conjugate) and RSV (Respiratory Syncytial Virus)  The above risks/problems have been discussed with the patient.  Pertinent information has been shared with the patient in the After Visit Summary.  An After Visit Summary and PPPS were made available to the patient.    Follow Up:   Next Medicare Wellness visit to be scheduled in 1 year.       Additional E&M Note during same encounter follows:  Patient has multiple medical problems which are significant and separately identifiable that require additional work above and beyond the Medicare Wellness Visit.      Chief Complaint  Medicare Wellness-subsequent (Physical Exam )    Subjective        HPI  Mikey Crawley is also being seen today for follow-up multimedical problems.    Hypertension.  Continues on Chikis, hydrochlorothiazide,  atenolol.  Prescribed by both me and the cardiologist.  History of sick sinus syndrome with pacemaker.  Creatinine electrolytes normal.    Impaired fasting glucose/prediabetes.  Glucose 129.  127 6 months ago.  A1c 5.7% recently, 5.8% 6 months ago.    Hyperlipidemia.  Continues on rosuvastatin 5 mg daily.  Recent lipid panel favorable.    Major depression.  Continues maintenance therapy with sertraline 50 mg daily.  He is having some episodes of feeling anxious about his wife who is been presenting him some challenges.    Continues to have trouble with fatigue and mostly osteoarthritis of multiple joints occluding neck wrists and knuckles.  Negative rheumatological workup last year.  Normal sed rate normal ESR.  Was seen by sports medicine.  Some relief with Tylenol or ibuprofen.  Not on anticoagulants.  Not on aspirin.  Coming a quality-of-life issue for him.         Objective   Vital Signs:  There were no vitals taken for this visit.    Physical Exam  Vitals and nursing note reviewed.   Constitutional:       General: He is not in acute distress.     Appearance: He is well-developed.   Cardiovascular:      Rate and Rhythm: Normal rate and regular rhythm.      Heart sounds: Normal heart sounds.   Pulmonary:      Effort: Pulmonary effort is normal.      Breath sounds: Normal breath sounds.   Musculoskeletal:      Comments: Osteoarthritic changes of the hands especially distal phalanges   Skin:     General: Skin is warm and dry.   Psychiatric:         Mood and Affect: Mood normal.          The following data was reviewed by: Sotero Torres MD on 11/27/2023:  Common labs          6/6/2023    11:20 8/2/2023    14:58 11/20/2023    10:10   Common Labs   Glucose 127   129    BUN 18   11    Creatinine 1.20   0.97    Sodium 141   142    Potassium 4.9   4.2    Chloride 103   104    Calcium 9.7   9.5    Total Protein 7.4   7.0    Albumin 4.5   4.6    Total Bilirubin 0.4   0.6    Alkaline Phosphatase 77   74    AST (SGOT) 20    17    ALT (SGPT) 23   18    WBC 8.13   9.41    Hemoglobin 14.7   14.3    Hematocrit 43.3   42.3    Platelets 219   200    Total Cholesterol   130    Triglycerides   150    HDL Cholesterol   40    LDL Cholesterol    64    Hemoglobin A1C 5.80   5.70    Uric Acid  6.5                  Assessment and Plan   Diagnoses and all orders for this visit:    1. Medicare annual wellness visit, subsequent (Primary)    2. Essential hypertension  -     Hemoglobin A1c; Future  -     Comprehensive Metabolic Panel; Future  -     Lipid Panel; Future  -     CBC & Differential; Future    3. Mixed hyperlipidemia  -     Hemoglobin A1c; Future  -     Comprehensive Metabolic Panel; Future  -     Lipid Panel; Future  -     CBC & Differential; Future    4. Recurrent major depressive disorder, in full remission  -     Hemoglobin A1c; Future  -     Comprehensive Metabolic Panel; Future  -     Lipid Panel; Future  -     CBC & Differential; Future    5. Impaired fasting glucose  -     Hemoglobin A1c; Future  -     Comprehensive Metabolic Panel; Future  -     Lipid Panel; Future  -     CBC & Differential; Future    6. Primary osteoarthritis involving multiple joints  -     Hemoglobin A1c; Future  -     Comprehensive Metabolic Panel; Future  -     Lipid Panel; Future  -     CBC & Differential; Future    Other orders  -     celecoxib (CeleBREX) 200 MG capsule; Take 1 capsule by mouth Daily As Needed (arthritis pain).  Dispense: 30 capsule; Refill: 2      Hypertension.  Fair control.  Continue current medication.  Also followed by cardiology.    Osteoarthritis of multiple joints.  Long discussion.  Possible depression overlay.  Continue sertraline.  I am adding Celebrex 200 mg daily as needed.  Try not to take every day.  Benefits at this point likely outweigh risks.  Especially with regards to quality of life.  He is aware this medication can increase risk of kidney problems, bleeding, hypertension, and other issues.  I will see him back within 3  months for recheck.  If not helping, stopped taking it.  If depression is worse, switch to duloxetine.    Impaired fasting glucose.  His most recent glucose levels were not fasting.  Continue to monitor.    Hyperlipidemia continue statin.             Follow Up   No follow-ups on file.  Patient was given instructions and counseling regarding his condition or for health maintenance advice. Please see specific information pulled into the AVS if appropriate.

## 2023-12-26 RX ORDER — AMLODIPINE AND OLMESARTAN MEDOXOMIL 5; 40 MG/1; MG/1
TABLET ORAL
Qty: 90 TABLET | Refills: 3 | Status: SHIPPED | OUTPATIENT
Start: 2023-12-26

## 2024-01-11 NOTE — TELEPHONE ENCOUNTER
Rx Refill Note  Requested Prescriptions     Pending Prescriptions Disp Refills    sertraline (ZOLOFT) 50 MG tablet [Pharmacy Med Name: Sertraline HCl 50 MG Oral Tablet] 90 tablet 1     Sig: TAKE 1 TABLET BY MOUTH DAILY      Last office visit with prescribing clinician: 11/27/2023   Last telemedicine visit with prescribing clinician: Visit date not found   Next office visit with prescribing clinician: 2/28/2024                         Would you like a call back once the refill request has been completed: [] Yes [] No    If the office needs to give you a call back, can they leave a voicemail: [] Yes [] No    Mann Beckham MA  01/11/24, 09:38 EST

## 2024-02-11 PROCEDURE — 93294 REM INTERROG EVL PM/LDLS PM: CPT | Performed by: INTERNAL MEDICINE

## 2024-02-11 PROCEDURE — 93296 REM INTERROG EVL PM/IDS: CPT | Performed by: INTERNAL MEDICINE

## 2024-02-12 RX ORDER — HYDROCHLOROTHIAZIDE 12.5 MG/1
12.5 TABLET ORAL DAILY
Qty: 90 TABLET | Refills: 1 | Status: SHIPPED | OUTPATIENT
Start: 2024-02-12

## 2024-02-28 ENCOUNTER — OFFICE VISIT (OUTPATIENT)
Dept: FAMILY MEDICINE CLINIC | Facility: CLINIC | Age: 84
End: 2024-02-28
Payer: MEDICARE

## 2024-02-28 VITALS
TEMPERATURE: 97.8 F | BODY MASS INDEX: 32.13 KG/M2 | WEIGHT: 204.7 LBS | OXYGEN SATURATION: 96 % | HEART RATE: 70 BPM | HEIGHT: 67 IN | SYSTOLIC BLOOD PRESSURE: 146 MMHG | DIASTOLIC BLOOD PRESSURE: 75 MMHG

## 2024-02-28 DIAGNOSIS — R73.01 IMPAIRED FASTING GLUCOSE: ICD-10-CM

## 2024-02-28 DIAGNOSIS — M15.9 PRIMARY OSTEOARTHRITIS INVOLVING MULTIPLE JOINTS: ICD-10-CM

## 2024-02-28 DIAGNOSIS — E78.2 MIXED HYPERLIPIDEMIA: ICD-10-CM

## 2024-02-28 DIAGNOSIS — F33.42 RECURRENT MAJOR DEPRESSIVE DISORDER, IN FULL REMISSION: ICD-10-CM

## 2024-02-28 DIAGNOSIS — I10 ESSENTIAL HYPERTENSION: Primary | ICD-10-CM

## 2024-02-28 PROCEDURE — 3078F DIAST BP <80 MM HG: CPT | Performed by: FAMILY MEDICINE

## 2024-02-28 PROCEDURE — 3077F SYST BP >= 140 MM HG: CPT | Performed by: FAMILY MEDICINE

## 2024-02-28 PROCEDURE — 99214 OFFICE O/P EST MOD 30 MIN: CPT | Performed by: FAMILY MEDICINE

## 2024-02-28 NOTE — PROGRESS NOTES
"Chief Complaint  Med Management (Resumed Celebrex last OV.  Tolerating well.), Depression, and Hypertension    Subjective        Mikey Crawley presents to [facility]  History of Present Illness    Follow-up osteoarthritis of multiple joints.  I started him on Celebrex 200 mg a day at last visit.  Took about a week and felt little bit better but then cut back.  We had discussed taking it just as needed.  Now uses it maybe twice a week for various pains.  Primarily in his hands but also now on his left shoulder.  Previous right shoulder surgery.  He is concerned he may have a rotator cuff.  When he lifts things at the store it can be painful and difficult.    Hypertension.  Blood pressure slightly elevated today.  He is not checking his blood pressure at home.  He continues on amlodipine ARB combination, hydrochlorothiazide.  And atenolol.  He also follows with cardiology.    Hyperlipidemia.  Continues on rosuvastatin 5 mg daily.  His most recent lipid panel was favorable.    Major depression.  Mostly in remission.  Continues on sertraline 50 mg a day.  He continues to struggle with his wife's illness but otherwise doing well.    Objective   Vital Signs:  /75   Pulse 70   Temp 97.8 °F (36.6 °C) (Temporal)   Ht 170.2 cm (67.01\")   Wt 92.9 kg (204 lb 11.2 oz)   SpO2 96%   BMI 32.05 kg/m²   Estimated body mass index is 32.05 kg/m² as calculated from the following:    Height as of this encounter: 170.2 cm (67.01\").    Weight as of this encounter: 92.9 kg (204 lb 11.2 oz).             Physical Exam  Vitals and nursing note reviewed.   Constitutional:       General: He is not in acute distress.     Appearance: He is well-developed.   Cardiovascular:      Rate and Rhythm: Normal rate and regular rhythm.      Heart sounds: Normal heart sounds.   Pulmonary:      Effort: Pulmonary effort is normal.      Breath sounds: Normal breath sounds.   Musculoskeletal:      Comments: Left " shoulder.  Good abduction.  Some limited internal rotation but better than the right side.  His strength is 5 out of 5 with all major muscle groups.  He has some limited external rotation.    Skin:     General: Skin is warm and dry.   Psychiatric:         Mood and Affect: Mood normal.        Result Review :    The following data was reviewed by: Sotero Torres MD on 02/28/2024:  Common labs          8/2/2023    14:58 11/20/2023    10:10 2/21/2024    09:53   Common Labs   Glucose  129  93    BUN  11  15    Creatinine  0.97  1.04    Sodium  142  142    Potassium  4.2  4.4    Chloride  104  104    Calcium  9.5  9.3    Total Protein  7.0  7.2    Albumin  4.6  4.6    Total Bilirubin  0.6  0.7    Alkaline Phosphatase  74  67    AST (SGOT)  17  20    ALT (SGPT)  18  18    WBC  9.41  8.25    Hemoglobin  14.3  15.0    Hematocrit  42.3  44.5    Platelets  200  191    Total Cholesterol  130  128    Triglycerides  150  131    HDL Cholesterol  40  39    LDL Cholesterol   64  66    Hemoglobin A1C  5.70  5.90    Uric Acid 6.5                     Assessment and Plan     Diagnoses and all orders for this visit:    1. Essential hypertension (Primary)  -     Hemoglobin A1c; Future  -     Comprehensive Metabolic Panel; Future  -     Lipid Panel; Future    2. Mixed hyperlipidemia  -     Hemoglobin A1c; Future  -     Comprehensive Metabolic Panel; Future  -     Lipid Panel; Future    3. Recurrent major depressive disorder, in full remission  -     Hemoglobin A1c; Future  -     Comprehensive Metabolic Panel; Future  -     Lipid Panel; Future    4. Impaired fasting glucose  -     Hemoglobin A1c; Future  -     Comprehensive Metabolic Panel; Future  -     Lipid Panel; Future    5. Primary osteoarthritis involving multiple joints      Osteoarthritis of multiple joints.  Including the left shoulder.  This time much less likely rotator cuff tear which he is worried about.  Likely osteoarthritis of the left shoulder.  I want him to get back to  his sports medicine doctor.  Continue Celebrex as needed only.  He is only taking it twice a week.    Impaired fasting glucose.  A1c just minimally elevated.  At this time no evidence of progression of diabetes.  But he is at risk for prediabetes.  We will continue to monitor.  See me in 6 months for Medicare wellness visit with lab work prior.    Hypertension.  Preferably would be best that he can check his blood pressure at home.  Continue medication as is.    Hyperlipidemia.  Continue rosuvastatin.    Major depression.  Mostly remission.  Continue sertraline.             Follow Up     Return in about 6 months (around 8/28/2024) for Subsequent Medicare Wellness Visit, Lab Visit One Week Prior to Next Appointment.  Patient was given instructions and counseling regarding his condition or for health maintenance advice. Please see specific information pulled into the AVS if appropriate.

## 2024-03-11 ENCOUNTER — OFFICE VISIT (OUTPATIENT)
Dept: SPORTS MEDICINE | Facility: CLINIC | Age: 84
End: 2024-03-11
Payer: MEDICARE

## 2024-03-11 VITALS
HEIGHT: 67 IN | DIASTOLIC BLOOD PRESSURE: 66 MMHG | SYSTOLIC BLOOD PRESSURE: 126 MMHG | TEMPERATURE: 96.8 F | OXYGEN SATURATION: 97 % | WEIGHT: 205 LBS | RESPIRATION RATE: 14 BRPM | BODY MASS INDEX: 32.18 KG/M2 | HEART RATE: 71 BPM

## 2024-03-11 DIAGNOSIS — M75.02 ADHESIVE CAPSULITIS OF LEFT SHOULDER: Chronic | ICD-10-CM

## 2024-03-11 DIAGNOSIS — M19.011 GLENOHUMERAL ARTHRITIS, RIGHT: Primary | Chronic | ICD-10-CM

## 2024-03-11 RX ORDER — TRIAMCINOLONE ACETONIDE 40 MG/ML
80 INJECTION, SUSPENSION INTRA-ARTICULAR; INTRAMUSCULAR ONCE
Status: COMPLETED | OUTPATIENT
Start: 2024-03-11 | End: 2024-03-11

## 2024-03-11 RX ADMIN — TRIAMCINOLONE ACETONIDE 80 MG: 40 INJECTION, SUSPENSION INTRA-ARTICULAR; INTRAMUSCULAR at 11:03

## 2024-03-11 NOTE — PROGRESS NOTES
"Chief Complaint  Pain and Follow-up of the Left Shoulder (Not able to lift anything)    Subjective        Mikey Crawley presents to [facility]  History of Present Illness  Patient was seen last year for cervical radiculitis treated with oral prednisone taper which she states was very helpful.  He has however noted over the past several months bilateral shoulder pain and loss of range of motion, left greater than right.  Shoulder pain is worse with elevation and abduction.  Also wakes him from sleep.  He has not noted any paresthesias.  No neck pain.  He has been trying Celebrex which has not given him any significant relief.    Objective   Vital Signs:  /66 (BP Location: Left arm, Patient Position: Sitting, Cuff Size: Adult)   Pulse 71   Temp 96.8 °F (36 °C) (Infrared)   Resp 14   Ht 170.2 cm (67\")   Wt 93 kg (205 lb)   SpO2 97%   BMI 32.11 kg/m²   Estimated body mass index is 32.11 kg/m² as calculated from the following:    Height as of this encounter: 170.2 cm (67\").    Weight as of this encounter: 93 kg (205 lb).             Physical Exam  Vitals reviewed.   Constitutional:       Appearance: He is well-developed.   HENT:      Head: Normocephalic and atraumatic.   Eyes:      Conjunctiva/sclera: Conjunctivae normal.      Pupils: Pupils are equal, round, and reactive to light.   Cardiovascular:      Comments: No peripheral edema  Pulmonary:      Effort: Pulmonary effort is normal.   Musculoskeletal:      Comments: Cervical spine with negative Spurling.    Left shoulder with decreased external rotation by approximately 20 degrees, greatly decreased internal rotation.  Motor 5 out of 5.    Right shoulder with decreased range of motion in external and internal rotation although not as significant as the left.  Motor 5 out of 5.      Patient has no pain with resisted range of motion of either shoulder.  Get a drop arm.   Skin:     General: Skin is warm and dry. " "  Neurological:      Mental Status: He is alert and oriented to person, place, and time.   Psychiatric:         Behavior: Behavior normal.        Result Review :          XR Shoulder 2+ View Right (08/02/2023 14:10) before meals and glenohumeral arthritis.  XR Shoulder 2+ View Left (07/07/2023 12:44)-AC arthritis with minimal changes of glenohumeral arthritis.      Ultrasound-Guided Shoulder Injection Procedure Note    Bilateral shoulder injection was discussed with the patient in detail, including indication, risks, benefits, and alternatives. Verbal consent was given for the procedure. Injection was performed by physician.  Injection site was identified by ultrasound examination, then cleaned with Betadine and alcohol swabs. Prior to needle insertion, ethyl chloride spray was used for surface anesthesia. Sterile technique was used. Ultrasound guidance was indicated for injection accuracy.  A 25-gauge, 1.5\" spinal needle was guided to the glenohumeral joint under continuous direct ultrasound visualization. Injectate was seen filing the space and passed without difficulty. The needle was removed and a simple bandage was applied. The procedure was tolerated well without difficulty.    Injection mixture:  1% lidocaine without epinephrine: 2 mL each  40 mg/mL triamcinolone acetonide: 1 mL each             Assessment and Plan     Diagnoses and all orders for this visit:    1. Glenohumeral arthritis, right (Primary)  -     Ambulatory Referral to Physical Therapy    2. Adhesive capsulitis of left shoulder  -     Ambulatory Referral to Physical Therapy      Discussed with the patient that the right shoulder does appear to have glenohumeral arthritis and his exam is consistent with this.  The left shoulder has mild glenohumeral arthritis but I think the majority of his symptoms may be due to adhesive capsulitis.  Will treat both with intra-articular steroid injection as above, postinjection instruction given regarding ice " and activity.  Will also start physical therapy.  If no significant improvement over the next 3 to 4 weeks then consider MRI shoulders.  He may DC Celebrex if it is not helpful.       Follow Up     No follow-ups on file.  Patient was given instructions and counseling regarding his condition or for health maintenance advice. Please see specific information pulled into the AVS if appropriate.

## 2024-03-12 ENCOUNTER — TREATMENT (OUTPATIENT)
Dept: PHYSICAL THERAPY | Facility: CLINIC | Age: 84
End: 2024-03-12
Payer: MEDICARE

## 2024-03-12 DIAGNOSIS — M75.02 ADHESIVE CAPSULITIS OF LEFT SHOULDER: ICD-10-CM

## 2024-03-12 DIAGNOSIS — M25.511 ACUTE PAIN OF RIGHT SHOULDER: Primary | ICD-10-CM

## 2024-03-12 NOTE — PROGRESS NOTES
Physical Therapy Initial Evaluation and Plan of Care        Patient: Mikey Crawley   : 1940  Diagnosis/ICD-10 Code:  Acute pain of right shoulder [M25.511]  Referring practitioner: Deniz Ruiz,*  Date of Initial Visit: 3/12/2024  Today's Date: 3/12/2024  Patient seen for 1 sessions           Subjective Questionnaire: QuickDASH: 52.27% disability      Subjective Evaluation    History of Present Illness  Mechanism of injury: Gary is a 83 year old male who presents with R shoulder pain. He had an injection in both shoulders. He had a bad fall on R shoulder a few years ago and had to have surgery. His L shoulder has been very painful and stiff, he was diagnosed with L shoulder adhesive capsulitis. He had injection in L shoulder, pain has been SO much better. He was having trouble sleeping through the night but that has improved since injection. He was struggling to get his jacket on prior to injection. He has difficulty picking up dog food.    Pain  Current pain ratin  At worst pain ratin  Quality: dull ache and sharp  Aggravating factors: overhead activity, outstretched reach, repetitive movement and lifting    Hand dominance: right    Treatments  Previous treatment: physical therapy  Patient Goals  Patient goals for therapy: decreased pain, increased strength, independence with ADLs/IADLs and increased motion           Objective          Postural Observations  Seated posture: poor  Standing posture: poor  Correction of posture: makes symptoms better      Active Range of Motion   Left Shoulder   Flexion: 133 degrees   Abduction: 115 degrees   External rotation BTH: C7   Internal rotation BTB: lumbar     Right Shoulder   Flexion: 155 degrees   Abduction: 140 degrees   External rotation BTH: T3   Internal rotation BTB: sacrum     Passive Range of Motion   Left Shoulder   Flexion: 170 degrees   Abduction: 160 degrees   External rotation 45°: 57 degrees     Right Shoulder   External rotation  45°: 72 degrees     Joint Play   Left Shoulder  Hypomobile in the anterior capsule, posterior capsule and inferior capsule.    Right Shoulder  Joints within functional limits are the anterior capsule, posterior capsule and inferior capsule.     Strength/Myotome Testing     Left Shoulder     Planes of Motion   Flexion: 4+   Abduction: 4+   External rotation at 0°: 4+   Internal rotation at 0°: 5     Right Shoulder     Planes of Motion   Flexion: 4+   Abduction: 4-   External rotation at 0°: 4   Internal rotation at 0°: 5           Assessment & Plan       Assessment  Impairments: abnormal muscle tone, abnormal or restricted ROM, activity intolerance, impaired physical strength, lacks appropriate home exercise program and pain with function   Functional limitations: carrying objects, lifting, pulling, pushing, reaching behind back, reaching overhead and unable to perform repetitive tasks   Assessment details: Pt presents with the following impairments: decreased shoulder ROM, limited shoulder strength, tenderness to palpation of shoulder structures, poor posture. Pt's signs and symptoms are consistent with referring diagnosis. Pt would benefit from additional skilled therapy to address the aforementioned problems and return to prior level of functioning with minimal functional limitations.  Prognosis: good    Goals  Plan Goals: Short Term Goals (achieve in 4 weeks):  1. Pt will decrease current pain to 2/10.   2. Pt will exhibit improved L shoulder capsular mobility.   3. Pt will increase L shoulder PROM ER to 70 degrees to improve functional reaching tasks.  4. Pt will exhibit improved posture by visual examination.  Long Term Goals (achieve in 6-8 weeks):  5. Pt will improve L shoulder rotation behind the back to level of T12 to improve grooming and dressing tasks.  6. Patient will improve bilateral shoulder flexion and abduction strength to 4+/5.  7. Pt will decrease QuickDASH score to 40%.  8. Pt will improve L  shoulder flexion AROM to 150 degrees and L shoulder abduction AROM to 135 degrees to improve overhead reaching abilities.    Plan  Therapy options: will be seen for skilled therapy services  Planned modality interventions: cryotherapy, TENS, thermotherapy (hydrocollator packs) and ultrasound  Planned therapy interventions: ADL retraining, flexibility, functional ROM exercises, home exercise program, IADL retraining, joint mobilization, therapeutic activities, stretching, strengthening, soft tissue mobilization, postural training and manual therapy  Frequency: 2x week  Duration in weeks: 8  Treatment plan discussed with: patient        Visit Diagnoses:    ICD-10-CM ICD-9-CM   1. Acute pain of right shoulder  M25.511 719.41   2. Adhesive capsulitis of right shoulder  M75.01 726.0       Timed:  Manual Therapy:    8     mins  25813;  Therapeutic Exercise:    15     mins  05140;     Neuromuscular Tone:        mins  21960;    Therapeutic Activity:     10     mins  82790;     Gait Training:           mins  78431;     Ultrasound:          mins  53552;    Electrical Stimulation:         mins  59219 ( );    Untimed:  Electrical Stimulation:         mins  24446 ( );  Mechanical Traction:         mins  08202;     Timed Treatment:   33   mins   Total Treatment:     45   mins    PT SIGNATURE: Valeriano Carter PT, DPT, OCS  DATE TREATMENT INITIATED: 3/12/2024      Initial Certification  Certification Period: 6/10/2024  I certify that the therapy services are furnished while this patient is under my care.  The services outlined above are required by this patient, and will be reviewed every 90 days.     PHYSICIAN: Deniz Ruiz MD      DATE:     Please sign and return via fax to [phone].. Thank you, [facility].

## 2024-03-12 NOTE — PATIENT INSTRUCTIONS
Access Code: [email or web address]  URL: [email or web address]  Date: 03/12/2024  Prepared by: Valeriano Carter    Exercises  - Seated Shoulder External Rotation AAROM with Cane and Hand in Neutral  - 1 x daily - 7 x weekly - 2-3 sets - 10 reps  - Doorway Pec Stretch at 90 Degrees Abduction  - 1 x daily - 7 x weekly - 3 sets - 20 hold  - Shoulder Flexion Wall Slide with Towel  - 1 x daily - 7 x weekly - 2-3 sets - 10 reps  - Standing Shoulder Internal Rotation Stretch with Towel  - 1 x daily - 7 x weekly - 10 reps - 5 hold

## 2024-03-18 ENCOUNTER — TREATMENT (OUTPATIENT)
Dept: PHYSICAL THERAPY | Facility: CLINIC | Age: 84
End: 2024-03-18
Payer: MEDICARE

## 2024-03-18 DIAGNOSIS — M75.02 ADHESIVE CAPSULITIS OF LEFT SHOULDER: ICD-10-CM

## 2024-03-18 DIAGNOSIS — M25.511 ACUTE PAIN OF RIGHT SHOULDER: Primary | ICD-10-CM

## 2024-03-18 NOTE — PROGRESS NOTES
Physical Therapy Progress Note    [facility] Physical Therapy  [address]  [address]  [place] [address]      Patient: Mikey Crawley   : 1940  Referring practitioner: Deniz Ruiz,*  Date of Initial Visit: Type: THERAPY  Noted: 3/12/2024  Today's Date: 3/18/2024  Patient seen for 2 sessions         Mikey Crawley reports: his pain has actually been a lot better, he still feels a little stiff but is almost back to baseline function.        Subjective     Objective   See Exercise, Manual, and Modality Logs for complete treatment.       Assessment/Plan  Gary continues to lack about 5-10 degrees of ER PROM of the L shoulder. He exhibits poor posture and needs constant cueing for shoulders back and down and to avoid upper trap compensations.  Visit Diagnoses:    ICD-10-CM ICD-9-CM   1. Acute pain of right shoulder  M25.511 719.41   2. Adhesive capsulitis of left shoulder  M75.02 726.0       Progress per Plan of Care           Timed:  Manual Therapy:    8     mins  61249;  Therapeutic Exercise:    15     mins  79295;     Neuromuscular Tone:        mins  28963;    Therapeutic Activity:     15     mins  58976;     Gait Training:           mins  39631;     Ultrasound:          mins  81706;    Electrical Stimulation:         mins  50238 ( );    Untimed:  Electrical Stimulation:         mins  13389 ( );  Mechanical Traction:         mins  09074;     Timed Treatment:   38   mins   Total Treatment:     38   mins  Valeriano Carter PT, DPT, OCS  Physical Therapist   [place] License #[id number]

## 2024-03-20 ENCOUNTER — TREATMENT (OUTPATIENT)
Dept: PHYSICAL THERAPY | Facility: CLINIC | Age: 84
End: 2024-03-20
Payer: MEDICARE

## 2024-03-20 DIAGNOSIS — M75.02 ADHESIVE CAPSULITIS OF LEFT SHOULDER: ICD-10-CM

## 2024-03-20 DIAGNOSIS — M25.511 ACUTE PAIN OF RIGHT SHOULDER: Primary | ICD-10-CM

## 2024-03-20 NOTE — PROGRESS NOTES
Physical Therapy Progress Note    [facility] Physical Therapy  [address]  [address]  [place] [address]      Patient: Mikey Crawley   : 1940  Referring practitioner: Deniz Ruiz,*  Date of Initial Visit: Type: THERAPY  Noted: 3/12/2024  Today's Date: 3/20/2024  Patient seen for 3 sessions         Mikey Crawley reports: HEP is going well, he may have overdone it with the doorway stretch he's a little sore.        Subjective     Objective   See Exercise, Manual, and Modality Logs for complete treatment.       Assessment/Plan  Gary exhibits full PROM of L shoulder flexion, his abduction and ER PROM are 10-15 degrees away from full ROM. His R shoulder capsule mobility is limited both posteriorly and anteriorly.  Visit Diagnoses:    ICD-10-CM ICD-9-CM   1. Acute pain of right shoulder  M25.511 719.41   2. Adhesive capsulitis of left shoulder  M75.02 726.0       Progress per Plan of Care           Timed:  Manual Therapy:    8     mins  73760;  Therapeutic Exercise:    15     mins  86425;     Neuromuscular Tone:        mins  13028;    Therapeutic Activity:     15     mins  47493;     Gait Training:           mins  45254;     Ultrasound:          mins  46747;    Electrical Stimulation:         mins  96000 ( );    Untimed:  Electrical Stimulation:         mins  19844 ( );  Mechanical Traction:         mins  21424;     Timed Treatment:   38   mins   Total Treatment:     38   mins  Valeriano Carter PT, DPT, OCS  Physical Therapist   [place] License #[id number]

## 2024-03-25 ENCOUNTER — TREATMENT (OUTPATIENT)
Dept: PHYSICAL THERAPY | Facility: CLINIC | Age: 84
End: 2024-03-25
Payer: MEDICARE

## 2024-03-25 DIAGNOSIS — M75.02 ADHESIVE CAPSULITIS OF LEFT SHOULDER: ICD-10-CM

## 2024-03-25 DIAGNOSIS — M25.511 ACUTE PAIN OF RIGHT SHOULDER: Primary | ICD-10-CM

## 2024-03-25 NOTE — PROGRESS NOTES
Physical Therapy Progress Note    [facility] Therapy  [address]  [address]  [place] [address]      Patient: Mikey Crawley   : 1940  Referring practitioner: Deniz Ruiz,*  Date of Initial Visit: Type: THERAPY  Noted: 3/12/2024  Today's Date: 3/25/2024  Patient seen for 4 sessions         Mikey Crawley reports: no new complaints.        Subjective     Objective   See Exercise, Manual, and Modality Logs for complete treatment.       Assessment/Plan  Gary continues to be tender and limited at end range R shoulder flexion and external rotation, but he is within ten degrees of full range either direction. He exhibits improved R shoulder IR behind his back and is reaching to about level of T7 with each arm.  Visit Diagnoses:    ICD-10-CM ICD-9-CM   1. Acute pain of right shoulder  M25.511 719.41   2. Adhesive capsulitis of left shoulder  M75.02 726.0       Progress per Plan of Care           Timed:  Manual Therapy:    8     mins  45156;  Therapeutic Exercise:    15     mins  57253;     Neuromuscular Tone:        mins  18587;    Therapeutic Activity:     15     mins  02501;     Gait Training:           mins  22070;     Ultrasound:          mins  69203;    Electrical Stimulation:         mins  81534 ( );    Untimed:  Electrical Stimulation:         mins  67358 ( );  Mechanical Traction:         mins  75280;     Timed Treatment:   38   mins   Total Treatment:     38   mins  Valeriano Carter PT, DPT, OCS  Physical Therapist   [place] License #[id number]

## 2024-03-27 ENCOUNTER — TREATMENT (OUTPATIENT)
Dept: PHYSICAL THERAPY | Facility: CLINIC | Age: 84
End: 2024-03-27
Payer: MEDICARE

## 2024-03-27 DIAGNOSIS — M25.511 ACUTE PAIN OF RIGHT SHOULDER: Primary | ICD-10-CM

## 2024-03-27 DIAGNOSIS — M75.02 ADHESIVE CAPSULITIS OF LEFT SHOULDER: ICD-10-CM

## 2024-03-27 NOTE — PROGRESS NOTES
Physical Therapy Progress Note    [facility] Physical Therapy  [address]  [address]  [place] [address]      Patient: Mikey Crawley   : 1940  Referring practitioner: Deniz Ruiz,*  Date of Initial Visit: Type: THERAPY  Noted: 3/12/2024  Today's Date: 3/27/2024  Patient seen for 5 sessions         Mikey Crawley reports: his pain has been pretty manageable lately, he still feels a little stiff but it's better than when he started PT.        Subjective     Objective   See Exercise, Manual, and Modality Logs for complete treatment.       Assessment/Plan  Gary continues to be limited by about 10 degrees in his R shoulder ER PROM. His IR behind the back is improving to about mid lumbar level but is more painful on R side than L.  Visit Diagnoses:    ICD-10-CM ICD-9-CM   1. Acute pain of right shoulder  M25.511 719.41   2. Adhesive capsulitis of left shoulder  M75.02 726.0       Progress per Plan of Care           Timed:  Manual Therapy:    8     mins  27484;  Therapeutic Exercise:    15     mins  68799;     Neuromuscular Tone:        mins  73447;    Therapeutic Activity:     15     mins  88212;     Gait Training:           mins  15616;     Ultrasound:          mins  89891;    Electrical Stimulation:         mins  55545 ( );    Untimed:  Electrical Stimulation:         mins  16499 ( );  Mechanical Traction:         mins  65927;     Timed Treatment:   38   mins   Total Treatment:     38   mins  Valeriano Carter PT, DPT, OCS  Physical Therapist   [place] License #[id number]

## 2024-04-01 ENCOUNTER — TREATMENT (OUTPATIENT)
Dept: PHYSICAL THERAPY | Facility: CLINIC | Age: 84
End: 2024-04-01
Payer: MEDICARE

## 2024-04-01 DIAGNOSIS — M75.02 ADHESIVE CAPSULITIS OF LEFT SHOULDER: ICD-10-CM

## 2024-04-01 DIAGNOSIS — M25.511 ACUTE PAIN OF RIGHT SHOULDER: Primary | ICD-10-CM

## 2024-04-01 PROCEDURE — 97140 MANUAL THERAPY 1/> REGIONS: CPT | Performed by: PHYSICAL THERAPIST

## 2024-04-01 PROCEDURE — 97110 THERAPEUTIC EXERCISES: CPT | Performed by: PHYSICAL THERAPIST

## 2024-04-01 NOTE — PROGRESS NOTES
Physical Therapy Daily Treatment Note  [facility] [place]   [address]  [place] [address]  P: [phone]       F: [phone]    Patient: Mikey Crawley   : 1940  Diagnosis/ICD-10 Code:  Acute pain of right shoulder [M25.511]  Referring practitioner: Deniz Ruiz,*  Date of Initial Visit: Type: THERAPY  Noted: 3/12/2024  Today's Date: 2024  Patient seen for 6 sessions       Mikey Crawley reports: R shoulder is doing pretty well. L shoulder is doing much better, a little stiff still.     Subjective     Objective   See Exercise, Manual, and Modality Logs for complete treatment.       Assessment/Plan  Subjectively, pt reports no increase of pain or discomfort with interventions performed today. Performed well with continued shoulder mobility interventions. Continues to demonstrate near full ROM in all planes of L shoulder ROM, although ER and abduction remains the most limited.  Continues to benefit from verbal/tactile cues to ensure proper form and technique for exercise performance.     Progress per Plan of Care           Manual Therapy:    10     mins  27940;  Therapeutic Exercise:    20     mins  50443;     Neuromuscular Tone:        mins  40015;    Therapeutic Activity:          mins  44659;     Gait Training:           mins  47757;     Ultrasound:          mins  64835;    Electrical Stimulation:         mins  35838;  Traction          mins 03814    Timed Treatment:   30   mins   Total Treatment:     30   mins    Carmela Núñez PTA  Physical Therapist Assistant [id number]

## 2024-04-03 ENCOUNTER — TREATMENT (OUTPATIENT)
Dept: PHYSICAL THERAPY | Facility: CLINIC | Age: 84
End: 2024-04-03
Payer: MEDICARE

## 2024-04-03 DIAGNOSIS — M25.511 ACUTE PAIN OF RIGHT SHOULDER: Primary | ICD-10-CM

## 2024-04-03 DIAGNOSIS — M75.02 ADHESIVE CAPSULITIS OF LEFT SHOULDER: ICD-10-CM

## 2024-04-03 RX ORDER — ROSUVASTATIN CALCIUM 5 MG/1
5 TABLET, COATED ORAL DAILY
Qty: 90 TABLET | Refills: 1 | Status: SHIPPED | OUTPATIENT
Start: 2024-04-03

## 2024-04-03 NOTE — TELEPHONE ENCOUNTER
Rx Refill Note  Requested Prescriptions     Pending Prescriptions Disp Refills    rosuvastatin (CRESTOR) 5 MG tablet [Pharmacy Med Name: Rosuvastatin Calcium 5 MG Oral Tablet] 90 tablet 1     Sig: TAKE 1 TABLET BY MOUTH DAILY      Last office visit with prescribing clinician: 2/28/2024   Last telemedicine visit with prescribing clinician: Visit date not found   Next office visit with prescribing clinician: 8/29/2024                         Would you like a call back once the refill request has been completed: [] Yes [] No    If the office needs to give you a call back, can they leave a voicemail: [] Yes [] No    Jyoti Guerin  04/03/24, 08:04 EDT

## 2024-04-03 NOTE — PROGRESS NOTES
Physical Therapy Progress Note    [facility] Therapy  [address]  [address]  [place] [address]      Patient: Mikey Crawley   : 1940  Referring practitioner: Deniz Ruiz,*  Date of Initial Visit: Type: THERAPY  Noted: 3/12/2024  Today's Date: 4/3/2024  Patient seen for 7 sessions         Mikey Crawley reports: his R shoulder is starting to bother him a little bit.        Subjective     Objective   See Exercise, Manual, and Modality Logs for complete treatment.       Assessment/Plan  Gary exhibits full PROM of R shoulder in all planes, his AROM R shoulder flexion continues to be limited to about 145 degrees and is slightly asymmetrical compared to L shoulder flexion. He continues to exhibit pec tightness and forward head/rounded shoulders posture.  Visit Diagnoses:    ICD-10-CM ICD-9-CM   1. Acute pain of right shoulder  M25.511 719.41   2. Adhesive capsulitis of left shoulder  M75.02 726.0       Progress per Plan of Care           Timed:  Manual Therapy:    10     mins  39195;  Therapeutic Exercise:    15     mins  83531;     Neuromuscular Tone:        mins  28953;    Therapeutic Activity:     10     mins  19834;     Gait Training:           mins  48113;     Ultrasound:          mins  35283;    Electrical Stimulation:         mins  11161 ( );    Untimed:  Electrical Stimulation:         mins  81598 ( );  Mechanical Traction:         mins  95783;     Timed Treatment:   35   mins   Total Treatment:     35   mins  Valeriano Carter PT, DPT, OCS  Physical Therapist   [place] License #[id number]

## 2024-04-08 ENCOUNTER — TREATMENT (OUTPATIENT)
Dept: PHYSICAL THERAPY | Facility: CLINIC | Age: 84
End: 2024-04-08
Payer: MEDICARE

## 2024-04-08 DIAGNOSIS — M75.02 ADHESIVE CAPSULITIS OF LEFT SHOULDER: ICD-10-CM

## 2024-04-08 DIAGNOSIS — M25.511 ACUTE PAIN OF RIGHT SHOULDER: Primary | ICD-10-CM

## 2024-04-08 PROCEDURE — 97110 THERAPEUTIC EXERCISES: CPT | Performed by: PHYSICAL THERAPIST

## 2024-04-08 PROCEDURE — 97140 MANUAL THERAPY 1/> REGIONS: CPT | Performed by: PHYSICAL THERAPIST

## 2024-04-08 NOTE — PROGRESS NOTES
Physical Therapy Daily Treatment Note  [facility] [place]   [address]  [place] [address]  P: [phone]       F: [phone]    Patient: Mikey Crawley   : 1940  Diagnosis/ICD-10 Code:  Acute pain of right shoulder [M25.511]  Referring practitioner: Deniz Ruiz,*  Date of Initial Visit: Type: THERAPY  Noted: 3/12/2024  Today's Date: 2024  Patient seen for 8 sessions       Mikey Crawley reports: R shoulder still bothering him some, but thinks that's how its going to be.     Subjective     Objective   See Exercise, Manual, and Modality Logs for complete treatment.       Assessment/Plan  Subjectively, pt reports no increase of pain or discomfort with interventions performed today. Performed well with continued scapular strengthening interventions. Continues to demonstrate near full PROM of L shoulder. Continues to benefit from verbal/tactile cues to ensure proper form and technique for exercise performance.     Progress per Plan of Care           Manual Therapy:    8     mins  66939;  Therapeutic Exercise:    23     mins  43011;     Neuromuscular Tone:        mins  81994;    Therapeutic Activity:          mins  02235;     Gait Training:           mins  96523;     Ultrasound:          mins  39405;    Electrical Stimulation:         mins  99304;  Traction          mins 90593    Timed Treatment:   31   mins   Total Treatment:     31   mins    Carmela Núñez PTA  Physical Therapist Assistant [id number]

## 2024-04-10 ENCOUNTER — TREATMENT (OUTPATIENT)
Dept: PHYSICAL THERAPY | Facility: CLINIC | Age: 84
End: 2024-04-10
Payer: MEDICARE

## 2024-04-10 DIAGNOSIS — M75.02 ADHESIVE CAPSULITIS OF LEFT SHOULDER: ICD-10-CM

## 2024-04-10 DIAGNOSIS — M25.511 ACUTE PAIN OF RIGHT SHOULDER: Primary | ICD-10-CM

## 2024-04-10 PROCEDURE — 97530 THERAPEUTIC ACTIVITIES: CPT | Performed by: PHYSICAL THERAPIST

## 2024-04-10 PROCEDURE — 97110 THERAPEUTIC EXERCISES: CPT | Performed by: PHYSICAL THERAPIST

## 2024-04-10 NOTE — PROGRESS NOTES
Re-Assessment / Re-Certification      Patient: Mikey Crawley   : 1940  Diagnosis/ICD-10 Code:  Acute pain of right shoulder [M25.511]  Referring practitioner: Deniz Ruiz,*  Date of Initial Visit: Type: THERAPY  Noted: 3/12/2024  Today's Date: 4/10/2024  Patient seen for 9 sessions      Subjective:   Mikey Crawley reports: he hasn't had any shoulder pain in several days, he's honestly feeling great.  Subjective Questionnaire: QuickDASH: 4.55  Clinical Progress: improved  Home Program Compliance: Yes  Treatment has included: therapeutic exercise, neuromuscular re-education, manual therapy, and therapeutic activity    Subjective   Objective          Active Range of Motion   Left Shoulder   Flexion: 165 degrees   Abduction: 155 degrees   External rotation 45°: 74 degrees   External rotation BTH: T3   Internal rotation 90°: 90 degrees   Internal rotation BTB: T9     Right Shoulder   Flexion: 170 degrees   Abduction: 160 degrees   External rotation 45°: 82 degrees   External rotation BTH: T3   Internal rotation 90°: 90 degrees   Internal rotation BTB: L2     Strength/Myotome Testing     Left Shoulder     Planes of Motion   Flexion: 5   Abduction: 4+   External rotation at 0°: 4+   Internal rotation at 0°: 5     Right Shoulder     Planes of Motion   Flexion: 5   Abduction: 4+   External rotation at 0°: 4+   Internal rotation at 0°: 5       Assessment/Plan    Visit Diagnoses:    ICD-10-CM ICD-9-CM   1. Acute pain of right shoulder  M25.511 719.41   2. Adhesive capsulitis of left shoulder  M75.02 726.0       Progress toward previous goals: All Met    Short Term Goals (achieve in 4 weeks):  1. Pt will decrease current pain to 2/10.  (Met)    2. Pt will exhibit improved L shoulder capsular mobility. (Met)    3. Pt will increase L shoulder PROM ER to 70 degrees to improve functional reaching tasks. (Met)    4. Pt will exhibit improved posture by visual examination. (Met)    Long Term Goals (achieve in  6-8 weeks):  5. Pt will improve L shoulder rotation behind the back to level of T12 to improve grooming and dressing tasks. (Met)    6. Patient will improve bilateral shoulder flexion and abduction strength to 4+/5. (Met)    7. Pt will decrease QuickDASH score to 40%. (Met)    8. Pt will improve L shoulder flexion AROM to 150 degrees and L shoulder abduction AROM to 135 degrees to improve overhead reaching abilities. (Met)        Recommendations: Discharge    Prognosis to achieve goals: good    PT Signature: Valeriano Carter PT, DPT, OCS      Based upon review of the patient's progress and continued therapy plan, it is my medical opinion that Mikey Crawley should continue physical therapy treatment at [facility]  [address] [place] [id number] [phone].    Signature: __________________________________  Deniz Ruiz MD    Timed:  Manual Therapy:         mins  19101;  Therapeutic Exercise:    15     mins  86027;     Neuromuscular Tone:        mins  60414;    Therapeutic Activity:     15     mins  84376;     Gait Training:           mins  64911;     Ultrasound:          mins  40055;    Electrical Stimulation:         mins  24895 ( );    Untimed:  Electrical Stimulation:         mins  81904 ( );  Mechanical Traction:         mins  35948;     Timed Treatment:   30   mins   Total Treatment:     30   mins

## 2024-06-04 ENCOUNTER — OFFICE VISIT (OUTPATIENT)
Dept: SPORTS MEDICINE | Facility: CLINIC | Age: 84
End: 2024-06-04
Payer: MEDICARE

## 2024-06-04 VITALS
DIASTOLIC BLOOD PRESSURE: 70 MMHG | OXYGEN SATURATION: 98 % | BODY MASS INDEX: 32.18 KG/M2 | WEIGHT: 205 LBS | SYSTOLIC BLOOD PRESSURE: 128 MMHG | HEART RATE: 64 BPM | HEIGHT: 67 IN | RESPIRATION RATE: 16 BRPM

## 2024-06-04 DIAGNOSIS — M25.511 CHRONIC RIGHT SHOULDER PAIN: Chronic | ICD-10-CM

## 2024-06-04 DIAGNOSIS — G89.29 CHRONIC RIGHT SHOULDER PAIN: Chronic | ICD-10-CM

## 2024-06-04 DIAGNOSIS — M25.531 BILATERAL WRIST PAIN: Primary | Chronic | ICD-10-CM

## 2024-06-04 DIAGNOSIS — R20.2 PARESTHESIAS: ICD-10-CM

## 2024-06-04 DIAGNOSIS — M25.532 BILATERAL WRIST PAIN: Primary | Chronic | ICD-10-CM

## 2024-06-04 PROCEDURE — 3078F DIAST BP <80 MM HG: CPT | Performed by: FAMILY MEDICINE

## 2024-06-04 PROCEDURE — 99214 OFFICE O/P EST MOD 30 MIN: CPT | Performed by: FAMILY MEDICINE

## 2024-06-04 PROCEDURE — 3074F SYST BP LT 130 MM HG: CPT | Performed by: FAMILY MEDICINE

## 2024-06-04 RX ORDER — MELOXICAM 15 MG/1
TABLET ORAL
Qty: 30 TABLET | Refills: 1 | Status: SHIPPED | OUTPATIENT
Start: 2024-06-04

## 2024-06-04 NOTE — PROGRESS NOTES
"Chief Complaint  Follow-up and Pain of the Left Shoulder (F/u eval for LT shoulder pain with frozen shoulder, has been doing well with PT - reports only taking celebrex as needed but doesn't get much relief with them ), Follow-up and Pain of the Right Shoulder (F/u eval for RT shoulder pain as well, reports pain has worsened, painful ROM - no PT on the RT - wants to possible get better imaging ), and Wrist Pain (New eval for b/l wrist pain for several months - pain getting worse, numbness and tingling at times going into the wrists but no other sxs reported - here for further evaluation and treatment )    Subjective        Mikey Crawley presents to [facility]  History of Present Illness  1.  Patient was seen on 3/11/2024 with bilateral shoulder pain.  At that time he appeared to have adhesive capsulitis of the left shoulder and glenohumeral arthritis right shoulder.  I performed an ultrasound-guided corticosteroid injection in both shoulders and patient was started in physical therapy for the adhesive capsulitis.  Patient states his left shoulder is doing great, he was discharged from PT after 3 weeks.  However patient continues to have pain in the right shoulder especially with overhead motions.  Patient has had previous right shoulder dislocation with subsequent arthroscopy for complete rotator cuff tear and manipulation for adhesive capsulitis.  Is not taking Celebrex, \"it does not really work\".  2.  For the past several months patient has been having intermittent episodes of bilateral wrist pain as he points to the dorsal aspects of the wrist.  Yesterday while driving he did note some tingling in the dorsal aspect right wrist up to mid forearm.  This seemed to improve after lowering his arm.  He has not had any injury to the wrist.  Has not noted any swelling or ecchymosis.  He is not really sure of any particular motions or time of day that causes the pain, \"I can just be " "sitting watching TV and it comes on all of a sudden\".  He is not really sure how long the pain lasts.Pain is equal in both wrists.   Objective   Vital Signs:  /70 (BP Location: Left arm, Patient Position: Sitting, Cuff Size: Adult)   Pulse 64   Resp 16   Ht 170.2 cm (67.01\")   Wt 93 kg (205 lb)   SpO2 98%   BMI 32.10 kg/m²   Estimated body mass index is 32.1 kg/m² as calculated from the following:    Height as of this encounter: 170.2 cm (67.01\").    Weight as of this encounter: 93 kg (205 lb).               Physical Exam  Vitals reviewed.   Constitutional:       Appearance: He is well-developed.   HENT:      Head: Normocephalic and atraumatic.   Eyes:      Conjunctiva/sclera: Conjunctivae normal.      Pupils: Pupils are equal, round, and reactive to light.   Cardiovascular:      Comments: No peripheral edema  Pulmonary:      Effort: Pulmonary effort is normal.   Musculoskeletal:      Comments: Right shoulder with mild limitation of external/internal rotation.  Patient has a positive empty can, negative drop arm.  Patient has negative scarf test.  Positive Neer and Rivera.  Negative Speed Yergason's.    Bilateral wrist normal in general appearance, no effusion or erythema.  Patient has no tenderness to palpation of the volar or palmar aspect of the wrist.  He does have bilateral first CMC crepitus on circumduction but is not particularly painful.  Negative Phalen's and Tinel over carpal tunnel.  Patient has full range of motion bilateral wrist.   Skin:     General: Skin is warm and dry.   Neurological:      Mental Status: He is alert and oriented to person, place, and time.   Psychiatric:         Behavior: Behavior normal.        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            Assessment and Plan "     Diagnoses and all orders for this visit:    1. Bilateral wrist pain (Primary)  -     XR Wrist 3+ View Left  -     XR Wrist 3+ View Right  -     meloxicam (MOBIC) 15 MG tablet; 1 po qd prn pain  Dispense: 30 tablet; Refill: 1  -     EMG & Nerve Conduction Test; Future    2. Chronic right shoulder pain  -     Cancel: MRI shoulder right w wo contrast; Future  -     Cancel: FL Contrast Injection CT / MRI; Future  -     Cancel: MRI Shoulder Right Arthrogram; Future  -     Ambulatory Referral to Physical Therapy  -     meloxicam (MOBIC) 15 MG tablet; 1 po qd prn pain  Dispense: 30 tablet; Refill: 1    3. Paresthesias  -     EMG & Nerve Conduction Test; Future    1.  For his continued right shoulder pain that did not respond to an intra-articular corticosteroid injection, he does have mild limitation of external/internal rotation and some evidence of possible impingement.  He does have a previous history of large rotator cuff tear repair and manipulation for adhesive capsulitis at that time.  Rather than checking MRI arthrogram of the right shoulder now we will start physical therapy but if he sees no significant improvement after 2 to 3 weeks then we will consider MRI arthrogram right shoulder.  2.  Intermittent bilateral dorsal aspect wrist pain at least on 1 occasion paresthesias on the right, patient has had previous cervical radiculitis, he does have first CMC arthritis bilaterally and scaphotrapezium arthritis more so on the right but I do not think the pain he describes is necessarily from arthritis.  I think it would be prudent to check a nerve conduction velocity.  Follow-up after nerve conduction velocity.         Follow Up     No follow-ups on file.  Patient was given instructions and counseling regarding his condition or for health maintenance advice. Please see specific information pulled into the AVS if appropriate.

## 2024-06-07 NOTE — TELEPHONE ENCOUNTER
Rx Refill Note  Requested Prescriptions     Pending Prescriptions Disp Refills    sertraline (ZOLOFT) 50 MG tablet [Pharmacy Med Name: Sertraline HCl 50 MG Oral Tablet] 90 tablet 1     Sig: TAKE 1 TABLET BY MOUTH DAILY      Last office visit with prescribing clinician: 2/28/2024   Last telemedicine visit with prescribing clinician: Visit date not found   Next office visit with prescribing clinician: 8/29/2024                         Would you like a call back once the refill request has been completed: [] Yes [] No    If the office needs to give you a call back, can they leave a voicemail: [] Yes [] No    Jyoti Guerin  06/07/24, 08:38 EDT

## 2024-06-11 ENCOUNTER — TREATMENT (OUTPATIENT)
Dept: PHYSICAL THERAPY | Facility: CLINIC | Age: 84
End: 2024-06-11
Payer: MEDICARE

## 2024-06-11 DIAGNOSIS — M25.511 CHRONIC RIGHT SHOULDER PAIN: Primary | ICD-10-CM

## 2024-06-11 DIAGNOSIS — G89.29 CHRONIC RIGHT SHOULDER PAIN: Primary | ICD-10-CM

## 2024-06-11 DIAGNOSIS — M25.611 DECREASED RANGE OF MOTION OF SHOULDER, RIGHT: ICD-10-CM

## 2024-06-11 NOTE — PATIENT INSTRUCTIONS
Pt was educated on findings of evaluation, purpose of treatment and goals for therapy. Treatment options discussed and questions answered. Pt was educated on exercises, self treatment and pain relief techniques.    Pt educated to continue to ice his R biceps and R shoulder, if any symptoms worsen with R biceps much, seek immediate medical attention.

## 2024-06-11 NOTE — PROGRESS NOTES
Physical Therapy Initial Evaluation and Plan of Care  [facility] [place]   [address]  [place] [address]  P: [phone]  F: [phone]    Patient: Mikey Crawley   : 1940  Diagnosis/ICD-10 Code:  Chronic right shoulder pain [M25.511, G89.29]  Referring practitioner: Deniz Ruiz,*  Date of Initial Visit: 2024  Today's Date: 2024  Patient seen for 1 session         Visit Diagnoses:    ICD-10-CM ICD-9-CM   1. Chronic right shoulder pain  M25.511 719.41    G89.29 338.29   2. Decreased range of motion of shoulder, right  M25.611 719.51       PMHx Reviewed : 2024      Subjective Evaluation    History of Present Illness  Mechanism of injury: Pt reports worsening R shoulder pain and ROM, had injection in 3/2024 which helped. Had PT for R shoulder pain and L shoulder for adhesive capsulitis in 2024 and progressed well. Pt reports that yesterday he tossed a ball to his dog and sudden onset of pain in R bicep. Pt has swelling and eccymosis along distal belly of R bicep. Report pain is much better this morning than last night.     PMH, 2018 fell on black ice and dislocation R shoulder and RC repair surgery     Pain  Current pain ratin  At best pain ratin  At worst pain ratin  Location: R shoulder  Quality: tight, discomfort and dull ache  Relieving factors: change in position, medications, heat, ice and rest  Aggravating factors: outstretched reach, prolonged positioning, overhead activity, lifting, movement and sleeping    Social Support  Lives with: spouse    Hand dominance: right    Treatments  Previous treatment: injection treatment and physical therapy  Current treatment: physical therapy  Patient Goals  Patient goals for therapy: decreased edema, decreased pain, increased motion, increased strength, independence with ADLs/IADLs and return to sport/leisure activities             Objective          Observations     Right  Shoulder  Positive for edema.     Additional Shoulder Observation Details  R distal bicep belly ecchymosis     Palpation     Right Tenderness of the biceps.     Tenderness     Right Shoulder  Tenderness in the biceps tendon (proximal), bicipital groove and coracoid process.     Active Range of Motion   Left Shoulder   Normal active range of motion    Right Shoulder   Flexion: 135 degrees with pain  Abduction: 130 degrees with pain  External rotation BTH: C5   Internal rotation BTB: sacrum with pain    Strength/Myotome Testing     Left Shoulder   Normal muscle strength    Right Shoulder     Planes of Motion   Flexion: 3+   Abduction: 3+   External rotation at 0°: 3+   Internal rotation at 0°: 3+     Isolated Muscles   Biceps: 4     Tests     Right Shoulder   Positive painful arc.   Negative empty can and Speed's.     Functional Assessment     Comments  Dash: 45.45          Assessment & Plan       Assessment  Impairments: abnormal or restricted ROM, activity intolerance, impaired physical strength, lacks appropriate home exercise program and pain with function   Functional limitations: carrying objects, lifting, uncomfortable because of pain, reaching behind back and reaching overhead   Assessment details: Pt presents with signs/symptoms consistent with diagnosis.  Will benefit from PT to address impairments as above to allow return to normal daily activities.  Testing of pt's R biceps tendon appears intact, will continue to monitor   Prognosis: good    Goals  Plan Goals: Short Term Goals: 2-4 weeks. Patient will:  1. Be independent with initial HEP  2. Be instructed in posture and body mechanics.  3. Demonstrate full GH PROM to allow for progressing of therapy exercises.    Long Term Goals: 4-8 weeks. Pt will:  1. Exhibit (L) shoulder AROM to WFL to allow for reaching overhead and out (ABD) without pain limiting function.  2. Demonstrate improved Right UE MMT of >/= 4+/5 to allow for performance of ADL's/household  management/recreational activities.  3. Pt able to reach overhead and lift 10# to allow for return to doing home/yard/recreational activities with min to no pain.  4. Report perceived disability </=15% based on QuickDASH    Plan  Therapy options: will be seen for skilled therapy services  Planned modality interventions: cryotherapy, thermotherapy (hydrocollator packs) and ultrasound  Planned therapy interventions: manual therapy, postural training, body mechanics training, flexibility, functional ROM exercises, home exercise program, stretching, strengthening, soft tissue mobilization, joint mobilization, therapeutic activities and neuromuscular re-education  Frequency: 2x week  Duration in weeks: 8  Treatment plan discussed with: patient          Manual Therapy:          mins  93798;  Therapeutic Exercise:          mins  84137;     Neuromuscular Tone:           mins  90559;    Therapeutic Activity:      10     mins  08880;     Gait Training:            mins  22949;     Ultrasound:           mins  00938;    Electrical Stimulation:          mins  83612 ( );  Dry Needling           mins self-pay  Traction           mins 48017  Canalith Repositioning         mins 77946      Timed Treatment:  10    mins   Total Treatment:       45 mins      PT: Naz Jordan PT     License Number: [id number]  Electronically signed by Naz Jordan PT, 06/11/24, 9:16 AM EDT    Certification Period: 6/11/2024 thru 9/8/2024  I certify that the therapy services are furnished while this patient is under my care.  The services outlined above are required by this patient, and will be reviewed every 90 days.         Physician Signature:__________________________________________________    PHYSICIAN: Dneiz Ruiz MD  NPI: [id number]                                      DATE:      Please sign in Epic or return via fax to [email or web address] . Thank you, [facility].

## 2024-06-17 ENCOUNTER — TREATMENT (OUTPATIENT)
Dept: PHYSICAL THERAPY | Facility: CLINIC | Age: 84
End: 2024-06-17
Payer: MEDICARE

## 2024-06-17 ENCOUNTER — TELEPHONE (OUTPATIENT)
Dept: SPORTS MEDICINE | Facility: CLINIC | Age: 84
End: 2024-06-17
Payer: MEDICARE

## 2024-06-17 DIAGNOSIS — M25.511 CHRONIC RIGHT SHOULDER PAIN: Primary | ICD-10-CM

## 2024-06-17 DIAGNOSIS — M25.611 DECREASED RANGE OF MOTION OF SHOULDER, RIGHT: ICD-10-CM

## 2024-06-17 DIAGNOSIS — G89.29 CHRONIC RIGHT SHOULDER PAIN: Primary | ICD-10-CM

## 2024-06-17 NOTE — TELEPHONE ENCOUNTER
Patient brought to clinic today after concern from physical therapy about bruising in his right arm.  He denies any major injury but developed considerable bruising in the arm that has improved somewhat.  Has some pain in the proximal humerus.    Brief physical exam notable for significant ecchymosis in the anterior/medial aspect of the humerus extending through the proximal half of the forearm.  Normal range of motion.  Normal strength.  There is tenderness palpation in the proximal humerus suspicious for proximal bicep rupture.  There is no tenderness in the distal bicep.  There is mild pain with resisted elbow flexion.  Normal supination.    Clinically most consistent with long head of the bicep rupture he will continue physical therapy and follow-up for his chronic shoulder pathology with Dr. Ruiz.

## 2024-06-17 NOTE — PROGRESS NOTES
Physical Therapy Daily Treatment Note  [facility] [place]   [address], [address]  [place] [address]  P: [phone]  F: [phone]    Patient: Mikey Crawley   : 1940  Referring practitioner: Deniz Ruiz,*  Date of Initial Visit: Type: THERAPY  Noted: 2024  Today's Date: 2024  Patient seen for 2 sessions       Visit Diagnoses:    ICD-10-CM ICD-9-CM   1. Chronic right shoulder pain  M25.511 719.41    G89.29 338.29   2. Decreased range of motion of shoulder, right  M25.611 719.51         Mikey Crawley reports: Pt reports R arm bruising has spread, has no pain.     Subjective     Objective   See Exercise, Manual, and Modality Logs for complete treatment.   Assessed R UE, eccymosis spread throughout R UE,  has 'meka' sign and suspect LHB tendon tear.  Spoke with nurse at pt's Dr office and referred pt to be seen by Dr.     Assessment/Plan Pt and Dr returned to clinic. Diagnosed with LHB tendon tear. Continue with PT and will treat conservatively.        Timed:         Manual Therapy:         mins  43805;     Therapeutic Exercise:         mins  00108;     Neuromuscular Tone:        mins  54773;    Therapeutic Activity:   15       mins  87306;     Gait Training:           mins  95425;     Ultrasound:         mins  63182;    Ionto                                   mins  20479  Self Care                            mins  70028  Traction          mins 96440      Un-Timed:  Canalith Repos         mins 22188  Electrical Stimulation:         mins  36165 ( );  Dry Needling          mins self-pay  Traction          mins 06653        Timed Treatment: 15     mins   Total Treatment:    15     mins    Naz Jordan PT  [place] License #: [id number]    Physical Therapist

## 2024-06-18 ENCOUNTER — OFFICE VISIT (OUTPATIENT)
Dept: CARDIOLOGY | Facility: CLINIC | Age: 84
End: 2024-06-18
Payer: MEDICARE

## 2024-06-18 ENCOUNTER — CLINICAL SUPPORT NO REQUIREMENTS (OUTPATIENT)
Age: 84
End: 2024-06-18
Payer: MEDICARE

## 2024-06-18 VITALS
WEIGHT: 209 LBS | SYSTOLIC BLOOD PRESSURE: 110 MMHG | BODY MASS INDEX: 32.8 KG/M2 | HEIGHT: 67 IN | DIASTOLIC BLOOD PRESSURE: 70 MMHG | OXYGEN SATURATION: 95 % | HEART RATE: 63 BPM

## 2024-06-18 DIAGNOSIS — I77.810 DILATED AORTIC ROOT: Primary | ICD-10-CM

## 2024-06-18 DIAGNOSIS — Z95.0 PRESENCE OF CARDIAC PACEMAKER: Primary | ICD-10-CM

## 2024-06-18 DIAGNOSIS — R91.1 NODULE OF UPPER LOBE OF LEFT LUNG: ICD-10-CM

## 2024-06-18 PROCEDURE — 1159F MED LIST DOCD IN RCRD: CPT | Performed by: NURSE PRACTITIONER

## 2024-06-18 PROCEDURE — 1160F RVW MEDS BY RX/DR IN RCRD: CPT | Performed by: NURSE PRACTITIONER

## 2024-06-18 PROCEDURE — 3078F DIAST BP <80 MM HG: CPT | Performed by: NURSE PRACTITIONER

## 2024-06-18 PROCEDURE — 99214 OFFICE O/P EST MOD 30 MIN: CPT | Performed by: NURSE PRACTITIONER

## 2024-06-18 PROCEDURE — G2211 COMPLEX E/M VISIT ADD ON: HCPCS | Performed by: NURSE PRACTITIONER

## 2024-06-18 PROCEDURE — 3074F SYST BP LT 130 MM HG: CPT | Performed by: NURSE PRACTITIONER

## 2024-06-18 NOTE — PROGRESS NOTES
"Date of Office Visit: 24  Encounter Provider: EVARISTO Moore  Place of Service: [facility]  Patient Name: Mikey Crawley  :1940    Chief Complaint   Patient presents with    Sick sinus syndrome    Follow-up   :     HPI: Mikey Crawley is a 84 y.o. male  with hyperlipidemia, sick sinus syndrome status post permanent pacemaker, chronic kidney disease, left upper lung nodule, obstructive sleep apnea, prediabetes, cervical disc disease, dilated aortic root, BPH, anxiety.    He is followed by Dr. Michelle Garcia.  He is a former patient of Dr. Favian Rodriguez.  I will visit with him for the first time I have reviewed his medical record.    He had permanent pacemaker implant May 2011 for sick sinus syndrome.    In 2022, his device reached [facility] and he had generator change.    In 2022 he had a nonischemic perfusion stress test which was low risk and ejection fraction of 54%.  Carotid duplex showed minimal plaquing bilateral.  Echocardiogram at that time showed normal left ventricular systolic function, grade 1 diastolic dysfunction, trace to mild aortic valve regurgitation    He presents today for annual reassessment.  He denies chest pain tightness pressure, edema, dizziness or palpitations.  He has some occasional shortness of breath when he works some outside.  He had no sensation or shortness of breath walking from the parking lot to our office room.  He denies palpitations.  He is compliant with CPAP.    No Known Allergies        Family and social history reviewed.     ROS  All other systems were reviewed and are negative          Objective:     Vitals:    24 1404   BP: 110/70   BP Location: Left arm   Patient Position: Sitting   Pulse: 63   SpO2: 95%   Weight: 94.8 kg (209 lb)   Height: 170.2 cm (67\")     Body mass index is 32.73 kg/m².    PHYSICAL EXAM:  Pulmonary:      Effort: Pulmonary effort is normal.      Breath sounds: Normal breath " sounds.   Cardiovascular:      Normal rate. Regular rhythm.         Procedures      Current Outpatient Medications   Medication Sig Dispense Refill    amlodipine-olmesartan (AMAYA) 5-40 MG per tablet TAKE 1 TABLET DAILY 90 tablet 3    atenolol (TENORMIN) 50 MG tablet Take 1 tablet by mouth Every Morning.      finasteride (PROSCAR) 5 MG tablet Take 1 tablet by mouth Daily.      hydroCHLOROthiazide 12.5 MG tablet TAKE 1 TABLET BY MOUTH DAILY 90 tablet 1    meloxicam (MOBIC) 15 MG tablet 1 po qd prn pain 30 tablet 1    rosuvastatin (CRESTOR) 5 MG tablet TAKE 1 TABLET BY MOUTH DAILY (Patient taking differently: Take 1 tablet by mouth. Take one tablet by mouth every Monday, Wednesday and Friday) 90 tablet 1    sertraline (ZOLOFT) 50 MG tablet TAKE 1 TABLET BY MOUTH DAILY 90 tablet 1    tamsulosin (FLOMAX) 0.4 MG capsule 24 hr capsule Take 2 capsules by mouth Every Night.      triamcinolone (KENALOG) 0.1 % cream Apply 1 Application topically to the appropriate area as directed 2 (Two) Times a Day. PRN       No current facility-administered medications for this visit.     Facility-Administered Medications Ordered in Other Visits   Medication Dose Route Frequency Provider Last Rate Last Admin    regadenoson (LEXISCAN) injection 0.4 mg  0.4 mg Intravenous Once in imaging Adelina Alvarez, MELYSSA, APRN        technetium tetrofosmin (Tc-MYOVIEW) injection 1 dose  1 dose Intravenous Once in imaging Adelina Alvarez, MELYSSA, APRN         Assessment:       Diagnosis Plan   1. Dilated aortic root  CT Chest Without Contrast      2. Nodule of upper lobe of left lung  CT Chest Without Contrast           Orders Placed This Encounter   Procedures    CT Chest Without Contrast     Standing Status:   Future     Standing Expiration Date:   6/18/2025     Order Specific Question:   Does this exam require Jose Bronch Protocol?     Answer:   No     Order Specific Question:   Reason for Exam:     Answer:   follow up JOSIE, dilated aorta     Order  Specific Question:   Release to patient     Answer:   Routine Release [[id number]]         Plan:   1.  84-year-old gentleman with sick sinus syndrome status post permanent pacemaker implantation May 2011 with generator change June 2022.  Device continues to be followed in our device clinic and was interrogated today  -This is considered complex management of a chronic medical condition.    2.  Hyperlipidemia on rosuvastatin  3.  Prediabetes with most recent hemoglobin A1c 5.90 February 2024  4.  Obstructive sleep apnea-reports compliance with CPAP  5.  Stable dilated aortic root measuring 4.2 cm on last CT chest without contrast November 2023-we will plan repeat CT without contrast November 2024  6.  Renal insufficiency  7.  Hypertension-blood pressure presentable controlled on current regimen continue the same  8.BPH  9.Left upper lung nodule on CT November 2023-reassess on CT this November  10.Carotid plaque with no significant stenosis on duplex November 2022  11. Anxiety  12.Cervical disc disease      Follow-up in 6 months            It has been a pleasure to participate in this patient's care.      Thank you,  EVARISTO Moore      **I used Dragon to dictate this note:**

## 2024-06-20 ENCOUNTER — TREATMENT (OUTPATIENT)
Dept: PHYSICAL THERAPY | Facility: CLINIC | Age: 84
End: 2024-06-20
Payer: MEDICARE

## 2024-06-20 DIAGNOSIS — M25.511 CHRONIC RIGHT SHOULDER PAIN: Primary | ICD-10-CM

## 2024-06-20 DIAGNOSIS — G89.29 CHRONIC RIGHT SHOULDER PAIN: Primary | ICD-10-CM

## 2024-06-20 DIAGNOSIS — M25.611 DECREASED RANGE OF MOTION OF SHOULDER, RIGHT: ICD-10-CM

## 2024-06-20 NOTE — PROGRESS NOTES
Physical Therapy Daily Treatment Note  [facility] [place]   [address], [address]  [place] [address]  P: [phone]  F: [phone]    Patient: Mikey Crawley   : 1940  Referring practitioner: Deniz Ruiz,*  Date of Initial Visit: Type: THERAPY  Noted: 2024  Today's Date: 2024  Patient seen for 3 sessions       Visit Diagnoses:    ICD-10-CM ICD-9-CM   1. Chronic right shoulder pain  M25.511 719.41    G89.29 338.29   2. Decreased range of motion of shoulder, right  M25.611 719.51         Mikey Crawley reports: R shoulder discomfort     Subjective     Objective   See Exercise, Manual, and Modality Logs for complete treatment.     Assessment/Plan continued swelling and ecchymosis in RUE. Tolerated new exercises well and without increased c/o pain. Plan details: Progress ROM / strengthening / stabilization / functional activity as tolerated       Timed:         Manual Therapy:         mins  74030;     Therapeutic Exercise:    23     mins  38985;     Neuromuscular Tone:        mins  04504;    Therapeutic Activity:    15     mins  37128;     Gait Training:           mins  35177;     Ultrasound:          mins  98340;    Ionto                                   mins  47283  Self Care                            mins  98378  Traction          mins 30020      Un-Timed:  Canalith Repos         mins 76708  Electrical Stimulation:         mins  09980 ( );  Dry Needling          mins self-pay  Traction          mins 27571        Timed Treatment: 38     mins   Total Treatment:    38   mins    Naz Jordan PT  [place] License #: [id number]    Physical Therapist

## 2024-06-25 ENCOUNTER — TREATMENT (OUTPATIENT)
Dept: PHYSICAL THERAPY | Facility: CLINIC | Age: 84
End: 2024-06-25
Payer: MEDICARE

## 2024-06-25 DIAGNOSIS — M25.611 DECREASED RANGE OF MOTION OF SHOULDER, RIGHT: ICD-10-CM

## 2024-06-25 DIAGNOSIS — G89.29 CHRONIC RIGHT SHOULDER PAIN: Primary | ICD-10-CM

## 2024-06-25 DIAGNOSIS — M25.511 CHRONIC RIGHT SHOULDER PAIN: Primary | ICD-10-CM

## 2024-06-25 NOTE — PROGRESS NOTES
Physical Therapy Daily Treatment Note  [facility] [place]   [address], [address]  [place] [address]  P: [phone]  F: [phone]    Patient: Mikey Crawley   : 1940  Referring practitioner: Deniz Ruiz,*  Date of Initial Visit: Type: THERAPY  Noted: 2024  Today's Date: 2024  Patient seen for 4 sessions       Visit Diagnoses:    ICD-10-CM ICD-9-CM   1. Chronic right shoulder pain  M25.511 719.41    G89.29 338.29   2. Decreased range of motion of shoulder, right  M25.611 719.51         Mikey Crawley reports: R shoulder ROM and pain improving.     Subjective     Objective   See Exercise, Manual, and Modality Logs for complete treatment.       Assessment/Plan Compliant/cooperative with current rehab efforts.  Benefits from verbal/tactile cues to ensure correct exercise performance/technique, hold time and position. Plan details: Progress ROM / strengthening / stabilization / functional activity as tolerated       Timed:         Manual Therapy:        mins  66757;     Therapeutic Exercise:   15      mins  06895;     Neuromuscular Tone:   15     mins  28679;    Therapeutic Activity:     8     mins  35051;     Gait Training:           mins  31338;     Ultrasound:          mins  62722;    Ionto                                   mins  02427  Self Care                           mins  19924  Traction          mins 55922      Un-Timed:  Canalith Repos         mins 69594  Electrical Stimulation:         mins  17645 ( );  Dry Needling          mins self-pay  Traction          mins 30271        Timed Treatment:   38   mins   Total Treatment:    38    mins    Naz Jordan PT  [place] License #: [id number]    Physical Therapist

## 2024-06-27 ENCOUNTER — TREATMENT (OUTPATIENT)
Dept: PHYSICAL THERAPY | Facility: CLINIC | Age: 84
End: 2024-06-27
Payer: MEDICARE

## 2024-06-27 DIAGNOSIS — M25.611 DECREASED RANGE OF MOTION OF SHOULDER, RIGHT: ICD-10-CM

## 2024-06-27 DIAGNOSIS — G89.29 CHRONIC RIGHT SHOULDER PAIN: Primary | ICD-10-CM

## 2024-06-27 DIAGNOSIS — M25.511 CHRONIC RIGHT SHOULDER PAIN: Primary | ICD-10-CM

## 2024-06-27 NOTE — PROGRESS NOTES
Physical Therapy Daily Treatment Note  [facility] Physical Therapy [place]   [address]  [place] [address]  P: [phone]  F: [phone]    Patient: Mikey Crawley   : 1940  Referring practitioner: Deniz Ruiz,*  Date of Initial Visit: Type: THERAPY  Noted: 2024  Today's Date: 2024  Patient seen for 5 sessions       Visit Diagnoses:    ICD-10-CM ICD-9-CM   1. Chronic right shoulder pain  M25.511 719.41    G89.29 338.29   2. Decreased range of motion of shoulder, right  M25.611 719.51         Mikey Crawley reports: R shoulder and upper arm feeling better, still a little swollen along bicep and feels tight but no pain.   Subjective     Objective   See Exercise, Manual, and Modality Logs for complete treatment.       Assessment/Plan good improvements with R shoulder ROM. Plan details: Progress ROM / strengthening / stabilization / functional activity as tolerated       Timed:         Manual Therapy:         mins  43845;     Therapeutic Exercise:   20      mins  53007;     Neuromuscular Tone:   10     mins  65284;    Therapeutic Activity:    8      mins  43396;     Gait Training:           mins  38734;     Ultrasound:          mins  60026;    Ionto                                   mins  56189  Self Care                            mins  23804  Traction          mins 99601      Un-Timed:  Canalith Repos         mins 01282  Electrical Stimulation:         mins  61692 ( );  Dry Needling          mins self-pay  Traction          mins 00751        Timed Treatment:   38   mins   Total Treatment:      38  mins    Naz Jordan PT  [place] License #: [id number]    Physical Therapist

## 2024-07-01 ENCOUNTER — TREATMENT (OUTPATIENT)
Dept: PHYSICAL THERAPY | Facility: CLINIC | Age: 84
End: 2024-07-01
Payer: MEDICARE

## 2024-07-01 DIAGNOSIS — M25.511 CHRONIC RIGHT SHOULDER PAIN: Primary | ICD-10-CM

## 2024-07-01 DIAGNOSIS — G89.29 CHRONIC RIGHT SHOULDER PAIN: Primary | ICD-10-CM

## 2024-07-01 DIAGNOSIS — M25.611 DECREASED RANGE OF MOTION OF SHOULDER, RIGHT: ICD-10-CM

## 2024-07-01 NOTE — PROGRESS NOTES
Physical Therapy Daily Treatment Note  [facility] Physical Therapy [place]   [address]  [place] [address]  P: [phone]  F: [phone]    Patient: Mikey Crawley   : 1940  Referring practitioner: Deniz Ruiz,*  Date of Initial Visit: Type: THERAPY  Noted: 2024  Today's Date: 2024  Patient seen for 6 sessions       Visit Diagnoses:    ICD-10-CM ICD-9-CM   1. Chronic right shoulder pain  M25.511 719.41    G89.29 338.29   2. Decreased range of motion of shoulder, right  M25.611 719.51         Mikey Crawley reports: R shoulder pain is improved, still some swelling from bicep tear.    Subjective     Objective   See Exercise, Manual, and Modality Logs for complete treatment.       Assessment/Plan R shoulder ROM progressing well. Compliant/cooperative with current rehab efforts.  Benefits from verbal/tactile cues to ensure correct exercise performance/technique, hold time and position. Plan details: Progress ROM / strengthening / stabilization / functional activity as tolerated       Timed:         Manual Therapy:         mins  08100;     Therapeutic Exercise:  20      mins  08647;     Neuromuscular Tone: 10       mins  57811;    Therapeutic Activity:          mins  29632;     Gait Training:          mins  63142;     Ultrasound:          mins  71919;    Ionto                                   mins  79613  Self Care                            mins  75953  Traction          mins 76760      Un-Timed:  Canalith Repos         mins 56032  Electrical Stimulation:         mins  81704 (MC );  Dry Needling          mins self-pay  Traction          mins 55913        Timed Treatment: 30     mins   Total Treatment:     40   mins    Naz Jordan PT  [place] License #: [id number]    Physical Therapist

## 2024-07-03 ENCOUNTER — TREATMENT (OUTPATIENT)
Dept: PHYSICAL THERAPY | Facility: CLINIC | Age: 84
End: 2024-07-03
Payer: MEDICARE

## 2024-07-03 DIAGNOSIS — M25.611 DECREASED RANGE OF MOTION OF SHOULDER, RIGHT: ICD-10-CM

## 2024-07-03 DIAGNOSIS — M25.511 CHRONIC RIGHT SHOULDER PAIN: Primary | ICD-10-CM

## 2024-07-03 DIAGNOSIS — G89.29 CHRONIC RIGHT SHOULDER PAIN: Primary | ICD-10-CM

## 2024-07-03 NOTE — PROGRESS NOTES
Physical Therapy Daily Treatment Note  [facility] Physical Therapy [place]   [address]  [place] [address]  P: [phone]  F: [phone]    Patient: Mikey Crawley   : 1940  Referring practitioner: Deniz Ruiz,*  Date of Initial Visit: Type: THERAPY  Noted: 2024  Today's Date: 7/3/2024  Patient seen for 7 sessions       Visit Diagnoses:    ICD-10-CM ICD-9-CM   1. Chronic right shoulder pain  M25.511 719.41    G89.29 338.29   2. Decreased range of motion of shoulder, right  M25.611 719.51         Mikey Crawley reports: he was leaning forward in garden and fell, landing on R side of face and forehead. Reports R shoulder is a little sore     Subjective     Objective   See Exercise, Manual, and Modality Logs for complete treatment.       Assessment/Plan deferred strengthening exercises today due to R shoulder soreness from fall. Plan details: Progress ROM / strengthening / stabilization / functional activity as tolerated       Timed:         Manual Therapy:         mins  68321;     Therapeutic Exercise:   20      mins  09506;     Neuromuscular Tone:        mins  42956;    Therapeutic Activity:   10      mins  55705;     Gait Training:           mins  61391;     Ultrasound:          mins  23988;    Ionto                                   mins  14201  Self Care                           mins  12148  Traction          mins 91696      Un-Timed:  Canalith Repos         mins 70501  Electrical Stimulation:         mins  90541 (MC );  Dry Needling          mins self-pay  Traction          mins 65638        Timed Treatment:   30   mins   Total Treatment:     40   mins    NIKOLAI Guillory License #: [id number]    Physical Therapist

## 2024-07-08 RX ORDER — HYDROCHLOROTHIAZIDE 12.5 MG/1
12.5 TABLET ORAL DAILY
Qty: 90 TABLET | Refills: 1 | Status: SHIPPED | OUTPATIENT
Start: 2024-07-08

## 2024-07-10 ENCOUNTER — TREATMENT (OUTPATIENT)
Dept: PHYSICAL THERAPY | Facility: CLINIC | Age: 84
End: 2024-07-10
Payer: MEDICARE

## 2024-07-10 DIAGNOSIS — G89.29 CHRONIC RIGHT SHOULDER PAIN: Primary | ICD-10-CM

## 2024-07-10 DIAGNOSIS — M25.511 CHRONIC RIGHT SHOULDER PAIN: Primary | ICD-10-CM

## 2024-07-10 DIAGNOSIS — M25.611 DECREASED RANGE OF MOTION OF SHOULDER, RIGHT: ICD-10-CM

## 2024-07-10 NOTE — PROGRESS NOTES
Re-Assessment / Progress Note  [facility] Physical Therapy [place]   [address]  [place] [address]  P: [phone]  F: [phone]  Patient: Mikey Crawley   : 1940  Diagnosis/ICD-10 Code:  Chronic right shoulder pain [M25.511, G89.29]  Referring practitioner: Deniz Ruiz,*  Date of Initial Visit: Episode Type: THERAPY  Noted: 2024    Today's Date: 7/10/2024  Patient seen for 8 sessions.      Subjective:   Mikey Crawley reports: R shoulder is better, still a little sore from fall.     Subjective Questionnaire: QuickDASH:   Clinical Progress: improved  Home Program Compliance: Yes  Treatment has included: therapeutic exercise, neuromuscular re-education, and therapeutic activity    Subjective   Objective   Right Shoulder   Flexion: 150degrees   Abduction: 150degrees   External rotation BTH: C7   Internal rotation BTB: L 3     Strength/Myotome Testing      Left Shoulder   Normal muscle strength     Right Shoulder      Planes of Motion   Flexion: 4+  Abduction: 4  External rotation at 0°: 4   Internal rotation at 0°: 4     Assessment/Plan  Progress toward previous goals: Partially Met    Goals    Plan Goals: Short Term Goals: 2-4 weeks. Patient will:  1. Be independent with initial HEP met  2. Be instructed in posture and body mechanics. met  3. Demonstrate full GH PROM to allow for progressing of therapy exercises. met     Long Term Goals: 4-8 weeks. Pt will:  1. Exhibit (R) shoulder AROM to WFL to allow for reaching overhead and out (ABD) without pain limiting function.  2. Demonstrate improved Right UE MMT of >/= 4+/5 to allow for performance of ADL's/household management/recreational activities.  3. Pt able to reach overhead and lift 10# to allow for return to doing home/yard/recreational activities with min to no pain.  4. Report perceived disability </=15% based on QuickDASH      Recommendations: Continue as planned  Timeframe: 1 month  Prognosis to  achieve goals: good    PT Signature: Naz Jordan, PT  [place] Lic. # [id number]      Based upon review of the patient's progress and continued therapy plan, it is my medical opinion that Mikey Crawley should continue physical therapy treatment at [facility] PHYSICAL THERAPY  [address] [facility], [address] [address] [phone] ; Fax Number [phone]    Signature: __________________________________  Deniz Ruiz MD    Manual Therapy:          mins  67221;  Therapeutic Exercise:    20      mins  08317;     Neuromuscular Tone:  10   mins  99270;    Therapeutic Activity:       8    mins  59385;     Gait Training:            mins  87424;     Ultrasound:           mins  79437;    Electrical Stimulation:         mins  15848 ( );  Dry Needling           mins self-pay  Traction           mins 66655  Canalith Repositioning         mins 69913      Timed Treatment:  38   mins   Total Treatment:     38   mins

## 2024-07-16 ENCOUNTER — TREATMENT (OUTPATIENT)
Dept: PHYSICAL THERAPY | Facility: CLINIC | Age: 84
End: 2024-07-16
Payer: MEDICARE

## 2024-07-16 DIAGNOSIS — M25.611 DECREASED RANGE OF MOTION OF SHOULDER, RIGHT: ICD-10-CM

## 2024-07-16 DIAGNOSIS — M25.511 CHRONIC RIGHT SHOULDER PAIN: Primary | ICD-10-CM

## 2024-07-16 DIAGNOSIS — G89.29 CHRONIC RIGHT SHOULDER PAIN: Primary | ICD-10-CM

## 2024-07-16 NOTE — PROGRESS NOTES
Physical Therapy Daily Treatment Note  [facility] Physical Therapy [place]   [address]  [place] [address]  P: [phone]  F: [phone]    Patient: Mikey Carwley   : 1940  Referring practitioner: Deniz Ruiz,*  Date of Initial Visit: Type: THERAPY  Noted: 2024  Today's Date: 2024  Patient seen for 9 sessions       Visit Diagnoses:    ICD-10-CM ICD-9-CM   1. Chronic right shoulder pain  M25.511 719.41    G89.29 338.29   2. Decreased range of motion of shoulder, right  M25.611 719.51         Mikey Crawley reports: R shoulder is getting better, having pain in his hips and going to make appt with Dr and wants PT for hips hips    Subjective     Objective   See Exercise, Manual, and Modality Logs for complete treatment.       Assessment/Plan Compliant/cooperative with current rehab efforts.  Benefits from verbal/tactile cues to ensure correct exercise performance/technique, hold time and position. Plan details: Progress ROM / strengthening / stabilization / functional activity as tolerated       Timed:         Manual Therapy:         mins  41695;     Therapeutic Exercise:   20      mins  05125;     Neuromuscular Tone:  10      mins  47353;    Therapeutic Activity:    8      mins  87930;     Gait Training:           mins  29141;     Ultrasound:          mins  57859;    Ionto                                   mins  04012  Self Care                            mins  18821  Traction          mins 95691      Un-Timed:  Canalith Repos         mins 91999  Electrical Stimulation:         mins  64570 (MC );  Dry Needling          mins self-pay  Traction          mins 09987        Timed Treatment:      mins   Total Treatment:         mins    Naz Jordan PT  [place] License #: [id number]    Physical Therapist

## 2024-07-18 ENCOUNTER — TREATMENT (OUTPATIENT)
Dept: PHYSICAL THERAPY | Facility: CLINIC | Age: 84
End: 2024-07-18
Payer: MEDICARE

## 2024-07-18 DIAGNOSIS — M25.511 CHRONIC RIGHT SHOULDER PAIN: Primary | ICD-10-CM

## 2024-07-18 DIAGNOSIS — G89.29 CHRONIC RIGHT SHOULDER PAIN: Primary | ICD-10-CM

## 2024-07-18 DIAGNOSIS — M25.611 DECREASED RANGE OF MOTION OF SHOULDER, RIGHT: ICD-10-CM

## 2024-07-18 RX ORDER — ATENOLOL 50 MG/1
50 TABLET ORAL
Qty: 90 TABLET | Refills: 3 | Status: SHIPPED | OUTPATIENT
Start: 2024-07-18

## 2024-07-18 NOTE — PROGRESS NOTES
Physical Therapy Daily Treatment Note  [facility] Physical Therapy [place]   [address]  [place] [address]  P: [phone]  F: [phone]    Patient: Mikey Crawley   : 1940  Referring practitioner: Deniz Ruiz,*  Date of Initial Visit: Type: THERAPY  Noted: 2024  Today's Date: 2024  Patient seen for 10 sessions       Visit Diagnoses:    ICD-10-CM ICD-9-CM   1. Chronic right shoulder pain  M25.511 719.41    G89.29 338.29   2. Decreased range of motion of shoulder, right  M25.611 719.51         Mikey Crawley reports: R shoulder feeling better, has appt tomorrow for B hip pain.     Subjective     Objective   See Exercise, Manual, and Modality Logs for complete treatment.       Assessment/Plan Compliant/cooperative with current rehab efforts.  Benefits from verbal/tactile cues to ensure correct exercise performance/technique, hold time and position. Plan details: Progress ROM / strengthening / stabilization / functional activity as tolerated       Timed:         Manual Therapy:         mins  82242;     Therapeutic Exercise:   20      mins  95857;     Neuromuscular Tone:   10     mins  02388;    Therapeutic Activity:    8      mins  26351;     Gait Training:           mins  39880;     Ultrasound:          mins  91648;    Ionto                                   mins  03306  Self Care                            mins  94769  Traction          mins 87406      Un-Timed:  Canalith Repos         mins 69665  Electrical Stimulation:         mins  76733 (MC );  Dry Needling          mins self-pay  Traction          mins 64461        Timed Treatment:   38   mins   Total Treatment:    38    mins    Naz Jordan PT  [place] License #: [id number]    Physical Therapist

## 2024-07-19 ENCOUNTER — OFFICE VISIT (OUTPATIENT)
Dept: SPORTS MEDICINE | Facility: CLINIC | Age: 84
End: 2024-07-19
Payer: MEDICARE

## 2024-07-19 VITALS
OXYGEN SATURATION: 98 % | SYSTOLIC BLOOD PRESSURE: 120 MMHG | BODY MASS INDEX: 32.18 KG/M2 | HEIGHT: 67 IN | DIASTOLIC BLOOD PRESSURE: 70 MMHG | WEIGHT: 205 LBS | HEART RATE: 67 BPM

## 2024-07-19 DIAGNOSIS — M25.551 BILATERAL HIP PAIN: Primary | ICD-10-CM

## 2024-07-19 DIAGNOSIS — M25.552 BILATERAL HIP PAIN: Primary | ICD-10-CM

## 2024-07-19 PROCEDURE — 99213 OFFICE O/P EST LOW 20 MIN: CPT | Performed by: FAMILY MEDICINE

## 2024-07-19 PROCEDURE — 3078F DIAST BP <80 MM HG: CPT | Performed by: FAMILY MEDICINE

## 2024-07-19 PROCEDURE — 3074F SYST BP LT 130 MM HG: CPT | Performed by: FAMILY MEDICINE

## 2024-07-19 NOTE — PROGRESS NOTES
"Mikey is a 84 y.o. year old male     Chief Complaint   Patient presents with    Hip Pain       History of Present Illness  HPI   Here today for bilateral hip pain.  He states this pain has been present on and off for years but seems to be worsening in the past several months.  Mostly posterior, worse with direct pressure, standing, or physical activity.  He states he has been in physical therapy for both shoulders which are gradually improving.      Review of Systems    /70   Pulse 67   Ht 170.2 cm (67\")   Wt 93 kg (205 lb)   SpO2 98%   BMI 32.11 kg/m²      Physical Exam    Vital signs reviewed.   General: No acute distress.      MSK Exam:  Ortho Exam  Bilateral hips: Normal appearance.  There is tenderness palpation posteriorly in the superior medial aspect of the gluteal musculature extending down to the piriformis bilaterally.  Normal hip range of motion.  Negative impingement.  Positive cross leg stretch bilaterally.  Negative VERNELL, FADIR, Stinchfield, straight leg raise.    Bilateral Hip X-Ray  Indication: Pain  AP Pelvis; AP and Frog Leg dedicated hip views    Findings:  No fracture  No bony lesion  Normal soft tissues  Minimal joint space narrowing bilaterally    No prior studies were available for comparison.     Procedures     Diagnoses and all orders for this visit:    Bilateral hip pain    Hip pain appears primarily muscular.  Agree with plan for physical therapy.  Follow-up to check on his progress in about 6 weeks.      EMR Dragon/Transcription disclaimer:    Much of this encounter note is an electronic transcription/translation of spoken language to printed text.  The electronic translation of spoken language may permit erroneous, or at times, nonsensical words or phrases to be inadvertently transcribed.  Although I have reviewed the note for such errors some may still exist.    "

## 2024-07-30 ENCOUNTER — TREATMENT (OUTPATIENT)
Dept: PHYSICAL THERAPY | Facility: CLINIC | Age: 84
End: 2024-07-30
Payer: MEDICARE

## 2024-07-30 DIAGNOSIS — M25.611 DECREASED RANGE OF MOTION OF SHOULDER, RIGHT: ICD-10-CM

## 2024-07-30 DIAGNOSIS — G89.29 CHRONIC RIGHT SHOULDER PAIN: Primary | ICD-10-CM

## 2024-07-30 DIAGNOSIS — M25.511 CHRONIC RIGHT SHOULDER PAIN: Primary | ICD-10-CM

## 2024-07-30 NOTE — PROGRESS NOTES
Physical Therapy Daily Treatment Note  [facility] Physical Therapy [place]   [address]  [place] [address]  P: [phone]       F: [phone]    Patient: Mikey Crawley   : 1940  Diagnosis/ICD-10 Code:  Chronic right shoulder pain [M25.511, G89.29]  Referring practitioner: Deniz Ruiz,*  Date of Initial Visit: Type: THERAPY  Noted: 2024  Today's Date: 2024  Patient seen for 11 sessions       Mikey Crawley reports: R shoulder has been doing much better. I am ready to start working on my hips.     Subjective     Objective   See Exercise, Manual, and Modality Logs for complete treatment.       Assessment/Plan  Subjectively, pt reports no increase of pain or discomfort with interventions performed today. Performed well with continued R shoulder mobility and strengthening interventions. Continues to demonstrate fatigue with exercise in clinic. Continues to benefit from verbal/tactile cues to ensure proper form and technique for exercise performance.     Progress per Plan of Care           Manual Therapy:         mins  13857;  Therapeutic Exercise:    15     mins  42372;     Neuromuscular Tone:        mins  69924;    Therapeutic Activity:     15     mins  30550;     Gait Training:           mins  47783;     Ultrasound:          mins  56064;    Electrical Stimulation:         mins  16558;  Traction          mins 15747    Timed Treatment:   30   mins   Total Treatment:     40   mins    Carmela Núñez PTA  Physical Therapist Assistant [id number]

## 2024-08-01 ENCOUNTER — TREATMENT (OUTPATIENT)
Dept: PHYSICAL THERAPY | Facility: CLINIC | Age: 84
End: 2024-08-01
Payer: MEDICARE

## 2024-08-01 DIAGNOSIS — R29.898 WEAKNESS OF BOTH HIPS: ICD-10-CM

## 2024-08-01 DIAGNOSIS — M25.552 BILATERAL HIP PAIN: Primary | ICD-10-CM

## 2024-08-01 DIAGNOSIS — M25.551 BILATERAL HIP PAIN: Primary | ICD-10-CM

## 2024-08-01 NOTE — PROGRESS NOTES
Physical Therapy Initial Evaluation and Plan of Care  [facility] Physical Therapy [place]   [address]  [place] [address]  P: [phone]  F: [phone]    Patient: Mikey Crawley   : 1940  Diagnosis/ICD-10 Code:  Bilateral hip pain [M25.551, M25.552]  Referring practitioner: Deniz Ruiz,*  Date of Initial Visit: 2024  Today's Date: 2024  Patient seen for 1 session         Visit Diagnoses:    ICD-10-CM ICD-9-CM   1. Bilateral hip pain  M25.551 719.45    M25.552    2. Weakness of both hips  R29.898 729.89       PMHx Reviewed : 2024      Subjective Evaluation    History of Present Illness  Mechanism of injury: Pt reports B hip pain worsening in the past 3 months. Reports increasing difficulty with standing and daily functional tasks.   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      Pain  Current pain ratin  At best pain rating: 3  At worst pain ratin  Location: B hips  Quality: discomfort, sharp and dull ache  Relieving factors: change in position, medications, heat, rest and support  Aggravating factors: standing, prolonged positioning and sleeping  Progression: worsening    Social Support  Lives in: multiple-level home  Lives with: spouse    Treatments  Previous treatment: medication  Current treatment: physical therapy  Patient Goals  Patient goals for therapy: decreased pain, increased motion, increased strength and independence with ADLs/IADLs             Objective          Static Posture     Lumbar Spine   Decreased lordosis.     Palpation   Left   Tenderness of the gluteus annabel, gluteus medius, lumbar paraspinals, piriformis and proximal biceps femoris.     Right Tenderness of the gluteus annabel, gluteus medius, lumbar paraspinals, piriformis and proximal biceps femoris.     Tenderness     Left Hip   Tenderness in the PSIS, greater trochanter and sacroiliac joint.     Right Hip    Tenderness in the PSIS, greater trochanter and sacroiliac joint.     Active Range of Motion   Left Hip   Flexion: WFL  Abduction: WFL    Right Hip   Flexion: WFL  Abduction: WFL    Strength/Myotome Testing     Left Hip   Planes of Motion   Flexion: 4+  Extension: 3+  Abduction: 3+  Adduction: 4+  External rotation: 4  Internal rotation: 4    Right Hip   Planes of Motion   Flexion: 4+  Extension: 3+  Abduction: 4  Adduction: 4+  External rotation: 4  Internal rotation: 4    Tests     Left Hip   Positive VERNELL and FADIR.   Negative SI compression.   90/90 SLR: Negative.     Right Hip   Positive VERNELL and FADIR.   Negative SI compression.   90/90 SLR: Negative.    Left Hip Flexibility Comments:   Decreased IR, ER and hamstring flexibility     Right Hip Flexibility Comments:   Decreased IR, ER and hamstring flexibility       Functional Assessment     Comments  LEFS: 45          Assessment & Plan       Assessment  Impairments: activity intolerance, impaired physical strength, lacks appropriate home exercise program, pain with function and weight-bearing intolerance   Functional limitations: lifting, sleeping, uncomfortable because of pain, moving in bed, sitting and standing   Assessment details: Patient will benefit from skilled PT services in order to address impairments and facilitate return to normal daily activities including ADL's, work and recreational activities.    Prognosis: good    Goals  Plan Goals: Short Term Goals: 2-4 weeks. Patient will:  1. Be independent with initial HEP  2. Be instructed in posture and body mechanics  3. Demonstrate TrA contraction during exercises in clinic without need for cueing.    Long Term Goals: 4-6 weeks. Patient will:  1. Demonstrate improved Bilateral lower extremity/core MMT of  >/= 4+/5 to allow for return to recreational/daily activities without increased pain.  2. Demonstrate normal lower extremity flexibility to allow for walking/ADL's/performance of household tasks  without pain.    4. Have no soft tissue restriction in involved musculature.  5. Report pain of </= 3 with all daily activities.  6. Perceived disability as measured on LEFS ./= 45  7. Be independent with long term HEP    Plan  Therapy options: will be seen for skilled therapy services  Planned modality interventions: cryotherapy, thermotherapy (hydrocollator packs) and ultrasound  Planned therapy interventions: abdominal trunk stabilization, manual therapy, neuromuscular re-education, body mechanics training, postural training, soft tissue mobilization, spinal/joint mobilization, strengthening, home exercise program, functional ROM exercises, flexibility, balance/weight-bearing training, stretching and therapeutic activities  Frequency: 2x week  Duration in weeks: 8  Treatment plan discussed with: patient    Pt was educated on findings of evaluation, purpose of treatment and goals for therapy. Treatment options discussed and questions answered. Pt was educated on exercises, self treatment and pain relief techniques.       Manual Therapy:          mins  84894;  Therapeutic Exercise:          mins  43819;     Neuromuscular Tone:           mins  03632;    Therapeutic Activity:        10   mins  87471;     Gait Training:            mins  21221;     Ultrasound:           mins  95659;    Electrical Stimulation:          mins  79471 ( );  Dry Needling           mins self-pay  Traction           mins 34035  Canalith Repositioning         mins 18399      Timed Treatment: 10     mins   Total Treatment:     35   mins      PT: Naz Jordan PT     License Number: [id number]  Electronically signed by Naz Jordan PT, 08/01/24, 10:08 AM EDT    Certification Period: 8/1/2024 thru 10/29/2024  I certify that the therapy services are furnished while this patient is under my care.  The services outlined above are required by this patient, and will be reviewed every 90 days.         Physician  Signature:__________________________________________________    PHYSICIAN: Deniz Ruiz MD  NPI: [id number]                                      DATE:      Please sign in Epic or return via fax to [email or web address] . Thank you, [facility] Physical Therapy.

## 2024-08-06 ENCOUNTER — TREATMENT (OUTPATIENT)
Dept: PHYSICAL THERAPY | Facility: CLINIC | Age: 84
End: 2024-08-06
Payer: MEDICARE

## 2024-08-06 DIAGNOSIS — M25.551 BILATERAL HIP PAIN: Primary | ICD-10-CM

## 2024-08-06 DIAGNOSIS — M25.552 BILATERAL HIP PAIN: Primary | ICD-10-CM

## 2024-08-06 DIAGNOSIS — R29.898 WEAKNESS OF BOTH HIPS: ICD-10-CM

## 2024-08-06 NOTE — PROGRESS NOTES
Physical Therapy Daily Treatment Note  [facility] Physical Therapy [place]   [address] [place] [address]  [place] [address]  P: [phone]  F: [phone]    Patient: Mikey Crawley   : 1940  Referring practitioner: Jacinto Falcon MD  Date of Initial Visit: Type: THERAPY  Noted: 2024  Today's Date: 2024  Patient seen for 2 sessions       Visit Diagnoses:    ICD-10-CM ICD-9-CM   1. Bilateral hip pain  M25.551 719.45    M25.552    2. Weakness of both hips  R29.898 729.89         Mikey Crawley reports: B hip pain is about the same, did not feel well over the weekend.     Subjective     Objective   See Exercise, Manual, and Modality Logs for complete treatment.   Noted slight redness along L LE from calf to ankle, no swelling or tenderness in calf. Slight tenderness around ankle. Pt noted it over the weekend. Educated pt to seek medical attention immediately if worsens or swelling. Pt voiced understanding.     Assessment/Plan Compliant/cooperative with current rehab efforts.  Benefits from verbal/tactile cues to ensure correct exercise performance/technique, hold time and position. Plan details: Progress ROM / strengthening / stabilization / functional activity as tolerated       Timed:         Manual Therapy:         mins  69565;     Therapeutic Exercise:  15       mins  64464;     Neuromuscular Tone: 15       mins  62560;    Therapeutic Activity:     8     mins  60985;     Gait Training:           mins  20706;     Ultrasound:          mins  56292;    Ionto                                   mins  03138  Self Care                            mins  11052  Traction          mins 29412      Un-Timed:  Canalith Repos        mins 85339  Electrical Stimulation:         mins  48775 ( );  Dry Needling          mins self-pay  Traction          mins 92962        Timed Treatment:   38   mins   Total Treatment:     38   mins    Naz Jordan PT  [place] License #: [id number]    Physical  Therapist

## 2024-08-08 ENCOUNTER — TREATMENT (OUTPATIENT)
Dept: PHYSICAL THERAPY | Facility: CLINIC | Age: 84
End: 2024-08-08
Payer: MEDICARE

## 2024-08-08 ENCOUNTER — TELEPHONE (OUTPATIENT)
Dept: PHYSICAL THERAPY | Facility: CLINIC | Age: 84
End: 2024-08-08

## 2024-08-08 DIAGNOSIS — M25.551 BILATERAL HIP PAIN: Primary | ICD-10-CM

## 2024-08-08 DIAGNOSIS — R29.898 WEAKNESS OF BOTH HIPS: ICD-10-CM

## 2024-08-08 DIAGNOSIS — M25.552 BILATERAL HIP PAIN: Primary | ICD-10-CM

## 2024-08-08 NOTE — PROGRESS NOTES
Physical Therapy Daily Treatment Note  [facility] [place]   [address]  [place] [address]  P: [phone]  F: [phone]    Patient: Mikey Crawley   : 1940  Referring practitioner: Jacinto Falcon MD  Date of Initial Visit: Type: THERAPY  Noted: 2024  Today's Date: 2024  Patient seen for 3 sessions       Visit Diagnoses:    ICD-10-CM ICD-9-CM   1. Bilateral hip pain  M25.551 719.45    M25.552    2. Weakness of both hips  R29.898 729.89         Mikey Crawley reports: no new c/o     Subjective     Objective   See Exercise, Manual, and Modality Logs for complete treatment.       Assessment/Plan L hip weakness and fatiguing noted with isolated strengthening exercises. Compliant/cooperative with current rehab efforts.  Benefits from verbal/tactile cues to ensure correct exercise performance/technique, hold time and position. Plan details: Progress ROM / strengthening / stabilization / functional activity as tolerated       Timed:         Manual Therapy:         mins  08841;     Therapeutic Exercise:   20      mins  99953;     Neuromuscular Tone:     10   mins  84746;    Therapeutic Activity:    8     mins  34699;     Gait Training:           mins  66882;     Ultrasound:          mins  34602;    Ionto                                   mins  31452  Self Care                           mins  21180  Traction          mins 21719      Un-Timed:  Canalith Repos         mins 25402  Electrical Stimulation:         mins  88736 ( );  Dry Needling          mins self-pay  Traction          mins 46576        Timed Treatment:  38    mins   Total Treatment:     38   mins    Naz Jordan PT  [place] License #: [id number]    Physical Therapist

## 2024-08-13 ENCOUNTER — TREATMENT (OUTPATIENT)
Dept: PHYSICAL THERAPY | Facility: CLINIC | Age: 84
End: 2024-08-13
Payer: MEDICARE

## 2024-08-13 DIAGNOSIS — M25.552 BILATERAL HIP PAIN: Primary | ICD-10-CM

## 2024-08-13 DIAGNOSIS — R29.898 WEAKNESS OF BOTH HIPS: ICD-10-CM

## 2024-08-13 DIAGNOSIS — M25.551 BILATERAL HIP PAIN: Primary | ICD-10-CM

## 2024-08-13 NOTE — PROGRESS NOTES
Physical Therapy Daily Treatment Note  [facility] [place]   [address], [address]  [place] [address]  P: [phone]  F: [phone]    Patient: Mikey Crawley   : 1940  Referring practitioner: Jacinto Falcon MD  Date of Initial Visit: Type: THERAPY  Noted: 2024  Today's Date: 2024  Patient seen for 4 sessions       Visit Diagnoses:    ICD-10-CM ICD-9-CM   1. Bilateral hip pain  M25.551 719.45    M25.552    2. Weakness of both hips  R29.898 729.89         Mikey Crawley reports: has increased pain in B hips, L>R yesterday, no change of activity other than riding on .     Subjective     Objective   See Exercise, Manual, and Modality Logs for complete treatment.       Assessment/Plan Compliant/cooperative with current rehab efforts.  Benefits from verbal/tactile cues to ensure correct exercise performance/technique, hold time and position. Plan details: Progress ROM / strengthening / stabilization / functional activity as tolerated       Timed:         Manual Therapy:   8      mins  67386;     Therapeutic Exercise: 15        mins  79369;     Neuromuscular Tone:  15      mins  28443;    Therapeutic Activity:          mins  00135;     Gait Training:           mins  14873;     Ultrasound:          mins  53385;    Ionto                                   mins  30272  Self Care                            mins  43941  Traction          mins 15246      Un-Timed:  Canalith Repos         mins 59774  Electrical Stimulation:         mins  70642 (MC );  Dry Needling          mins self-pay  Traction          mins 20531        Timed Treatment:  38    mins   Total Treatment:   48     mins    Naz Jordan PT  [place] License #: [id number]    Physical Therapist

## 2024-08-15 ENCOUNTER — TREATMENT (OUTPATIENT)
Dept: PHYSICAL THERAPY | Facility: CLINIC | Age: 84
End: 2024-08-15
Payer: MEDICARE

## 2024-08-15 DIAGNOSIS — R29.898 WEAKNESS OF BOTH HIPS: ICD-10-CM

## 2024-08-15 DIAGNOSIS — M25.551 BILATERAL HIP PAIN: Primary | ICD-10-CM

## 2024-08-15 DIAGNOSIS — M25.552 BILATERAL HIP PAIN: Primary | ICD-10-CM

## 2024-08-15 NOTE — PROGRESS NOTES
Physical Therapy Daily Treatment Note  [facility] [place]   [address], [address]  [place] [address]  P: [phone]  F: [phone]    Patient: Mikey Crawley   : 1940  Referring practitioner: Jacinto Falcon MD  Date of Initial Visit: Type: THERAPY  Noted: 2024  Today's Date: 8/15/2024  Patient seen for 5 sessions       Visit Diagnoses:    ICD-10-CM ICD-9-CM   1. Bilateral hip pain  M25.551 719.45    M25.552    2. Weakness of both hips  R29.898 729.89         Mikey Crawley reports: L hip was a little sore after last visit.     Subjective     Objective   See Exercise, Manual, and Modality Logs for complete treatment.       Assessment/Plan Compliant/cooperative with current rehab efforts.  Benefits from verbal/tactile cues to ensure correct exercise performance/technique, hold time and position. Plan details: Progress ROM / strengthening / stabilization / functional activity as tolerated       Timed:         Manual Therapy:         mins  53435;     Therapeutic Exercise:     20    mins  66763;     Neuromuscular Tone:   10     mins  21371;    Therapeutic Activity:   8       mins  37930;     Gait Training:           mins  25501;     Ultrasound:          mins  69745;    Ionto                                   mins  43777  Self Care                            mins  98014  Traction          mins 19610      Un-Timed:  Canalith Repos         mins 39187  Electrical Stimulation:         mins  91985 ( );  Dry Needling          mins self-pay  Traction          mins 38915        Timed Treatment: 38     mins   Total Treatment:    48    mins    Naz Jordan PT  [place] License #: [id number]    Physical Therapist

## 2024-08-20 ENCOUNTER — TREATMENT (OUTPATIENT)
Dept: PHYSICAL THERAPY | Facility: CLINIC | Age: 84
End: 2024-08-20
Payer: MEDICARE

## 2024-08-20 DIAGNOSIS — M25.551 BILATERAL HIP PAIN: Primary | ICD-10-CM

## 2024-08-20 DIAGNOSIS — R29.898 WEAKNESS OF BOTH HIPS: ICD-10-CM

## 2024-08-20 DIAGNOSIS — M25.552 BILATERAL HIP PAIN: Primary | ICD-10-CM

## 2024-08-20 NOTE — PROGRESS NOTES
Physical Therapy Daily Treatment Note  [facility] [place]   [address]  [place] [address]  P: [phone]  F: [phone]    Patient: Mieky Crawley   : 1940  Referring practitioner: Jacinto Falcon MD  Date of Initial Visit: Type: THERAPY  Noted: 2024  Today's Date: 2024  Patient seen for 6 sessions       Visit Diagnoses:    ICD-10-CM ICD-9-CM   1. Bilateral hip pain  M25.551 719.45    M25.552    2. Weakness of both hips  R29.898 729.89         Mikey Crawley reports: continued fluctuating B hip pain, L>R.     Subjective     Objective   See Exercise, Manual, and Modality Logs for complete treatment.       Assessment/Plan Compliant/cooperative with current rehab efforts.  Benefits from verbal/tactile cues to ensure correct exercise performance/technique, hold time and position. Plan details: Progress ROM / strengthening / stabilization / functional activity as tolerated       Timed:         Manual Therapy:         mins  91727;     Therapeutic Exercise:  15       mins  60133;     Neuromuscular Tone:   15     mins  06316;    Therapeutic Activity:      8    mins  15433;     Gait Training:           mins  75748;     Ultrasound:          mins  06468;    Ionto                                   mins  33941  Self Care                            mins  74592  Traction          mins 87529      Un-Timed:  Canalith Repos         mins 59005  Electrical Stimulation:         mins  10216 ( );  Dry Needling          mins self-pay  Traction          mins 00705        Timed Treatment:   38   mins   Total Treatment:     38   mins    Naz Jordan PT  [place] License #: [id number]    Physical Therapist

## 2024-08-22 ENCOUNTER — TREATMENT (OUTPATIENT)
Dept: PHYSICAL THERAPY | Facility: CLINIC | Age: 84
End: 2024-08-22
Payer: MEDICARE

## 2024-08-22 DIAGNOSIS — M25.552 BILATERAL HIP PAIN: Primary | ICD-10-CM

## 2024-08-22 DIAGNOSIS — R29.898 WEAKNESS OF BOTH HIPS: ICD-10-CM

## 2024-08-22 DIAGNOSIS — M25.551 BILATERAL HIP PAIN: Primary | ICD-10-CM

## 2024-08-22 NOTE — PROGRESS NOTES
Physical Therapy Daily Treatment Note  [facility] [place]   [address]  [place] [address]  P: [phone]  F: [phone]    Patient: Mikey Crawley   : 1940  Referring practitioner: Jacinto Falcon MD  Date of Initial Visit: Type: THERAPY  Noted: 2024  Today's Date: 2024  Patient seen for 7 sessions       Visit Diagnoses:    ICD-10-CM ICD-9-CM   1. Bilateral hip pain  M25.551 719.45    M25.552    2. Weakness of both hips  R29.898 729.89         Mikey Watsoncalf reports: B hip pain is a little better     Subjective     Objective   See Exercise, Manual, and Modality Logs for complete treatment.   Progressed resistance with LE exercises     Assessment/Plan good tolerance to progression of exercises.Plan details: Progress ROM / strengthening / stabilization / functional activity as tolerated       Timed:         Manual Therapy:         mins  94159;     Therapeutic Exercise:  15       mins  40814;     Neuromuscular Tone:   15     mins  23292;    Therapeutic Activity:       8   mins  51221;     Gait Training:           mins  39977;     Ultrasound:          mins  16861;    Ionto                                   mins  63895  Self Care                            mins  06314  Traction          mins 95438      Un-Timed:  Canalith Repos         mins 67593  Electrical Stimulation:         mins  66298 ( );  Dry Needling          mins self-pay  Traction          mins 43482        Timed Treatment:  38    mins   Total Treatment:    38    mins    Naz Jordan PT  [place] License #: [id number]    Physical Therapist

## 2024-08-27 ENCOUNTER — TREATMENT (OUTPATIENT)
Dept: PHYSICAL THERAPY | Facility: CLINIC | Age: 84
End: 2024-08-27
Payer: MEDICARE

## 2024-08-27 DIAGNOSIS — M25.552 BILATERAL HIP PAIN: Primary | ICD-10-CM

## 2024-08-27 DIAGNOSIS — R29.898 WEAKNESS OF BOTH HIPS: ICD-10-CM

## 2024-08-27 DIAGNOSIS — M25.551 BILATERAL HIP PAIN: Primary | ICD-10-CM

## 2024-08-27 NOTE — PROGRESS NOTES
Physical Therapy Daily Treatment Note  [facility] [place]   [address]  [place] [address]  P: [phone]  F: [phone]    Patient: Mikey Crawley   : 1940  Referring practitioner: Jacinto Falcon MD  Date of Initial Visit: Type: THERAPY  Noted: 2024  Today's Date: 2024  Patient seen for 8 sessions       Visit Diagnoses:    ICD-10-CM ICD-9-CM   1. Bilateral hip pain  M25.551 719.45    M25.552    2. Weakness of both hips  R29.898 729.89         Mikey Crawley reports: hips doing ok, little sore after having to get down on floor yesterday to fix a drawer     Subjective     Objective   See Exercise, Manual, and Modality Logs for complete treatment.       Assessment/Plan fatigues with end reps of standing hip abd exercise. Needs to continue PT to improve B hip strength. Plan details: Progress ROM / strengthening / stabilization / functional activity as tolerated       Timed:         Manual Therapy:         mins  29185;     Therapeutic Exercise:  15       mins  10695;     Neuromuscular Tone:    15    mins  15221;    Therapeutic Activity:      8   mins  64769;     Gait Training:           mins  73802;     Ultrasound:          mins  31714;    Ionto                                   mins  21685  Self Care                            mins  25505  Traction          mins 89735      Un-Timed:  Canalith Repos         mins 10539  Electrical Stimulation:         mins  12244 ( );  Dry Needling          mins self-pay  Traction          mins 43803        Timed Treatment:   38   mins   Total Treatment:    48    mins    Naz Jordan PT  [place] License #: [id number]    Physical Therapist

## 2024-08-29 ENCOUNTER — TREATMENT (OUTPATIENT)
Dept: PHYSICAL THERAPY | Facility: CLINIC | Age: 84
End: 2024-08-29
Payer: MEDICARE

## 2024-08-29 ENCOUNTER — OFFICE VISIT (OUTPATIENT)
Dept: FAMILY MEDICINE CLINIC | Facility: CLINIC | Age: 84
End: 2024-08-29
Payer: MEDICARE

## 2024-08-29 VITALS
TEMPERATURE: 97.3 F | WEIGHT: 202.2 LBS | SYSTOLIC BLOOD PRESSURE: 142 MMHG | HEIGHT: 67 IN | BODY MASS INDEX: 31.74 KG/M2 | HEART RATE: 68 BPM | OXYGEN SATURATION: 97 % | DIASTOLIC BLOOD PRESSURE: 74 MMHG

## 2024-08-29 DIAGNOSIS — I10 ESSENTIAL HYPERTENSION: ICD-10-CM

## 2024-08-29 DIAGNOSIS — R29.898 WEAKNESS OF BOTH HIPS: ICD-10-CM

## 2024-08-29 DIAGNOSIS — M25.552 BILATERAL HIP PAIN: Primary | ICD-10-CM

## 2024-08-29 DIAGNOSIS — E78.2 MIXED HYPERLIPIDEMIA: ICD-10-CM

## 2024-08-29 DIAGNOSIS — Z00.00 MEDICARE ANNUAL WELLNESS VISIT, SUBSEQUENT: Primary | ICD-10-CM

## 2024-08-29 DIAGNOSIS — F33.42 RECURRENT MAJOR DEPRESSIVE DISORDER, IN FULL REMISSION: ICD-10-CM

## 2024-08-29 DIAGNOSIS — M25.551 BILATERAL HIP PAIN: Primary | ICD-10-CM

## 2024-08-29 DIAGNOSIS — R73.01 IMPAIRED FASTING GLUCOSE: ICD-10-CM

## 2024-08-29 NOTE — PROGRESS NOTES
Physical Therapy Daily Treatment Note  [facility] [place]   [address]  [place] [address]  P: [phone]  F: [phone]    Patient: Mikey Crawley   : 1940  Referring practitioner: Jacinto Falcon MD  Date of Initial Visit: Type: THERAPY  Noted: 2024  Today's Date: 2024  Patient seen for 9 sessions       Visit Diagnoses:    ICD-10-CM ICD-9-CM   1. Bilateral hip pain  M25.551 719.45    M25.552    2. Weakness of both hips  R29.898 729.89         Mikey Crawley reports: no new c/o     Subjective     Objective   See Exercise, Manual, and Modality Logs for complete treatment.       Assessment/Plan Compliant/cooperative with current rehab efforts.  Benefits from verbal/tactile cues to ensure correct exercise performance/technique, hold time and position. Plan details: Progress ROM / strengthening / stabilization / functional activity as tolerated       Timed:         Manual Therapy:         mins  58577;     Therapeutic Exercise:  20       mins  55524;     Neuromuscular Tone:  10      mins  46913;    Therapeutic Activity:      8    mins  12981;     Gait Training:           mins  09328;     Ultrasound:          mins  10498;    Ionto                                   mins  69472  Self Care                            mins  79139  Traction          mins 44318      Un-Timed:  Canalith Repos         mins 06689  Electrical Stimulation:         mins  18786 ( );  Dry Needling          mins self-pay  Traction          mins 53306        Timed Treatment:  38    mins   Total Treatment:     48   mins    Naz Jordan PT  [place] License #: [id number]    Physical Therapist

## 2024-08-29 NOTE — PROGRESS NOTES
Subjective   The ABCs of the Annual Wellness Visit  Medicare Wellness Visit      Mikey Crawley is a 84 y.o. patient who presents for a Medicare Wellness Visit.    The following portions of the patient's history were reviewed and   updated as appropriate: allergies, current medications, past family history, past medical history, past social history, past surgical history, and problem list.    Compared to one year ago, the patient's physical   health is the same.  Compared to one year ago, the patient's mental   health is the same.    Recent Hospitalizations:  He was not admitted to the hospital during the last year.     Current Medical Providers:  Patient Care Team:  Sotero Torres MD as PCP - General (Family Medicine)  Dima Alvarado MD as Consulting Physician (Orthopedic Surgery)  Jacinto Maldonado MD as Consulting Physician (Orthopedic Surgery)  Michelle Garcia MD as Consulting Physician (Cardiology)    Outpatient Medications Prior to Visit   Medication Sig Dispense Refill    amlodipine-olmesartan (AMAYA) 5-40 MG per tablet TAKE 1 TABLET DAILY 90 tablet 3    atenolol (TENORMIN) 50 MG tablet Take 1 tablet by mouth Every Morning. 90 tablet 3    finasteride (PROSCAR) 5 MG tablet Take 1 tablet by mouth Daily.      hydroCHLOROthiazide 12.5 MG tablet TAKE 1 TABLET BY MOUTH DAILY 90 tablet 1    meloxicam (MOBIC) 15 MG tablet 1 po qd prn pain 30 tablet 1    rosuvastatin (CRESTOR) 5 MG tablet TAKE 1 TABLET BY MOUTH DAILY (Patient taking differently: Take 1 tablet by mouth. Take one tablet by mouth every Monday, Wednesday and Friday) 90 tablet 1    sertraline (ZOLOFT) 50 MG tablet TAKE 1 TABLET BY MOUTH DAILY 90 tablet 1    tamsulosin (FLOMAX) 0.4 MG capsule 24 hr capsule Take 2 capsules by mouth Every Night.      triamcinolone (KENALOG) 0.1 % cream Apply 1 Application topically to the appropriate area as directed 2 (Two) Times a Day. PRN       Facility-Administered Medications Prior to Visit   Medication Dose Route  "Frequency Provider Last Rate Last Admin    regadenoson (LEXISCAN) injection 0.4 mg  0.4 mg Intravenous Once in imaging Adelina Alvarez, MELYSSA, APRN        technetium tetrofosmin (Tc-MYOVIEW) injection 1 dose  1 dose Intravenous Once in imaging Adelina Alvarez, MELYSSA, APRN         No opioid medication identified on active medication list. I have reviewed chart for other potential  high risk medication/s and harmful drug interactions in the elderly.      Aspirin is not on active medication list.  Aspirin use is not indicated based on review of current medical condition/s. Risk of harm outweighs potential benefits.  .    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  ACP discussion was held with the patient during this visit. Patient has an advance directive (not in EMR), copy requested.            Objective   Vitals:    08/29/24 1102   BP: 142/74   Pulse: 68   Temp: 97.3 °F (36.3 °C)   TempSrc: Temporal   SpO2: 97%   Weight: 91.7 kg (202 lb 3.2 oz)   Height: 170.2 cm (67\")       Estimated body mass index is 31.67 kg/m² as calculated from the following:    Height as of this encounter: 170.2 cm (67\").    Weight as of this encounter: 91.7 kg (202 lb 3.2 oz).            Does the patient have evidence of cognitive impairment? No  Lab Results   Component Value Date    CHLPL 150 08/22/2024    TRIG 123 08/22/2024 "    HDL 43 2024    LDL 85 2024    VLDL 22 2024    HGBA1C 5.60 2024                                                                                                Health  Risk Assessment    Smoking Status:  Social History     Tobacco Use   Smoking Status Former    Types: Pipe, Cigars    Passive exposure: Past (parents smoked in the home)   Smokeless Tobacco Never   Tobacco Comments    never smoked cigarets only cigars now and then    Caffeine - 2- 3 cups coffee daily      Alcohol Consumption:  Social History     Substance and Sexual Activity   Alcohol Use Not Currently    Comment: seldom drink       Fall Risk Screen  STEADI Fall Risk Assessment was completed, and patient is at LOW risk for falls.Assessment completed on:2024    Depression Screenin/29/2024    11:01 AM   PHQ-2/PHQ-9 Depression Screening   Little Interest or Pleasure in Doing Things 0-->not at all   Feeling Down, Depressed or Hopeless 1-->several days   PHQ-9: Brief Depression Severity Measure Score 1     Health Habits and Functional and Cognitive Screenin/29/2024    10:00 AM   Functional & Cognitive Status   Do you have difficulty preparing food and eating? No   Do you have difficulty bathing yourself, getting dressed or grooming yourself? Yes   Do you have difficulty using the toilet? No   Do you have difficulty moving around from place to place? Yes   Do you have trouble with steps or getting out of a bed or a chair? Yes   Current Diet Well Balanced Diet   Dental Exam Up to date   Eye Exam Up to date   Exercise (times per week) 3 times per week   Current Exercises Include Walking   Do you need help using the phone?  No   Are you deaf or do you have serious difficulty hearing?  Yes   Do you need help to go to places out of walking distance? No   Do you need help shopping? No   Do you need help preparing meals?  Yes   Do you need help with housework?  Yes   Do you need help with laundry? Yes   Do you need  help taking your medications? No   Do you need help managing money? No   Do you ever drive or ride in a car without wearing a seat belt? No   Have you felt unusual stress, anger or loneliness in the last month? Yes   Who do you live with? Spouse   If you need help, do you have trouble finding someone available to you? No   Have you been bothered in the last four weeks by sexual problems? No   Do you have difficulty concentrating, remembering or making decisions? No           Age-appropriate Screening Schedule:  Refer to the list below for future screening recommendations based on patient's age, sex and/or medical conditions. Orders for these recommended tests are listed in the plan section. The patient has been provided with a written plan.    Health Maintenance List  Health Maintenance   Topic Date Due    DXA SCAN  Never done    RSV Vaccine - Adults (1 - 1-dose 60+ series) Never done    ZOSTER VACCINE (2 of 3) 01/28/2014    ANNUAL WELLNESS VISIT  11/16/2023    COVID-19 Vaccine (7 - 2023-24 season) 02/06/2024    INFLUENZA VACCINE  08/01/2024    BMI FOLLOWUP  07/19/2025    LIPID PANEL  08/22/2025    TDAP/TD VACCINES (2 - Td or Tdap) 09/28/2028    Pneumococcal Vaccine 65+  Completed    COLORECTAL CANCER SCREENING  Discontinued                                                                                                                                                CMS Preventative Services Quick Reference  Risk Factors Identified During Encounter  Immunizations Discussed/Encouraged: Influenza, Prevnar 20 (Pneumococcal 20-valent conjugate), COVID19, and RSV (Respiratory Syncytial Virus)    The above risks/problems have been discussed with the patient.  Pertinent information has been shared with the patient in the After Visit Summary.  An After Visit Summary and PPPS were made available to the patient.    Follow Up:   Next Medicare Wellness visit to be scheduled in 1 year.         Additional E&M Note during same  "encounter follows:  Patient has additional, significant, and separately identifiable condition(s)/problem(s) that require work above and beyond the Medicare Wellness Visit     Chief Complaint  Medicare Wellness-subsequent    Subjective   HPI            Hypertension follow-up.  He continues amlodipine/olmesartan 5-40, atenolol 50 mg, hydrochlorothiazide 12.5 mg.  Recent creatinine and electrolytes normal.  Blood pressure slightly elevated today 142/74.  However he has been checked again at home and is running normal there.    History of impaired fasting glucose.  Glucose recently 103.  However A1c has normalized.  Previously 5.9%.    Hyperlipidemia.  He continues rosuvastatin.  Lipid panel overall favorable.  Liver enzymes are normal.    Major depression.  Mostly in remission.  He continues on maintenance sertraline 50 mg daily.  Sodium normal.  His wife suffers from cognitive impairment/dementia.  He is coping.  He is going to physical therapy which is helping his shoulder pain and hip pain.      Objective   Vital Signs:  /74   Pulse 68   Temp 97.3 °F (36.3 °C) (Temporal)   Ht 170.2 cm (67\")   Wt 91.7 kg (202 lb 3.2 oz)   SpO2 97%   BMI 31.67 kg/m²   Physical Exam  Vitals and nursing note reviewed.   Constitutional:       General: He is not in acute distress.     Appearance: He is well-developed.   Cardiovascular:      Rate and Rhythm: Normal rate and regular rhythm.      Heart sounds: Normal heart sounds.   Pulmonary:      Effort: Pulmonary effort is normal.      Breath sounds: Normal breath sounds.   Skin:     General: Skin is warm and dry.   Psychiatric:         Mood and Affect: Mood normal.                 Assessment and Plan       Annual Medicare wellness visit.    Immunizations reviewed.  RSV, COVID-19, influenza, Prevnar 20 recommended at retail pharmacy.    Hypertension.  Slightly elevated blood pressure here but running better at home and here previously.  Continue all " medication.    Hyperlipidemia.  Continue statin.    Impaired fasting glucose.  No evidence of progression of diabetes.    Major depression.  Mostly in remission.  Given counseling today.  His wife is suffering from dementia.  See me in 6 months.        Medicare annual wellness visit, subsequent    Essential hypertension    Mixed hyperlipidemia     Impaired fasting glucose    Recurrent major depressive disorder, in full remission      Orders Placed This Encounter   Procedures    Comprehensive Metabolic Panel     Standing Status:   Future     Standing Expiration Date:   8/29/2025     Scheduling Instructions:      Patient to have lab work drawn before next visit           Order Specific Question:   Release to patient     Answer:   Routine Release [[id number]]    Lipid Panel     Standing Status:   Future     Standing Expiration Date:   8/29/2025     Scheduling Instructions:      Patient to have lab work drawn before next visit           Order Specific Question:   Release to patient     Answer:   Routine Release [[id number]]    Hemoglobin A1c     Standing Status:   Future     Standing Expiration Date:   8/29/2025     Scheduling Instructions:      Patient to have lab work drawn before next visit           Order Specific Question:   Release to patient     Answer:   Routine Release [[id number]]    CBC & Differential     Standing Status:   Future     Standing Expiration Date:   8/29/2025     Scheduling Instructions:      Patient to have lab work drawn before next visit     Order Specific Question:   Manual Differential     Answer:   No     Order Specific Question:   Release to patient     Answer:   Routine Release [[id number]]             Follow Up   No follow-ups on file.  Patient was given instructions and counseling regarding his condition or for health maintenance advice. Please see specific information pulled into the AVS if appropriate.

## 2024-09-03 ENCOUNTER — TREATMENT (OUTPATIENT)
Dept: PHYSICAL THERAPY | Facility: CLINIC | Age: 84
End: 2024-09-03
Payer: MEDICARE

## 2024-09-03 DIAGNOSIS — M25.552 BILATERAL HIP PAIN: Primary | ICD-10-CM

## 2024-09-03 DIAGNOSIS — R29.898 WEAKNESS OF BOTH HIPS: ICD-10-CM

## 2024-09-03 DIAGNOSIS — M25.551 BILATERAL HIP PAIN: Primary | ICD-10-CM

## 2024-09-03 RX ORDER — ROSUVASTATIN CALCIUM 5 MG/1
5 TABLET, COATED ORAL DAILY
Qty: 90 TABLET | Refills: 1 | Status: SHIPPED | OUTPATIENT
Start: 2024-09-03

## 2024-09-03 NOTE — PROGRESS NOTES
Re-Assessment / Progress Note  [facility] [place]   [address], [address]  [place] [address]  P: [phone]  F: [phone]  Patient: Mikey Crawley   : 1940  Diagnosis/ICD-10 Code:  Bilateral hip pain [M25.551, M25.552]  Referring practitioner: Jacinto Falcon MD  Date of Initial Visit: Episode Type: THERAPY  Noted: 2024    Today's Date: 9/3/2024  Patient seen for 10 sessions.      Subjective:   Mikey Crawley reports: B hips, L>R, are a little sore after exercises but pain is a little better.     Subjective Questionnaire: LEFS:   Clinical Progress: improved  Home Program Compliance: Yes  Treatment has included: therapeutic exercise, neuromuscular re-education, therapeutic activity, and moist heat    Subjective   Objective   Strength/Myotome Testing      Left Hip   Planes of Motion   Flexion: 4+  Extension: 3+  Abduction: 3+  Adduction: 4+  External rotation: 4  Internal rotation: 4     Right Hip   Planes of Motion   Flexion: 4+  Extension: 4  Abduction: 4  Adduction: 4+  External rotation: 4  Internal rotation: 4     Assessment/Plan  Progress toward previous goals: Partially Met    Goals    Goals  Plan Goals: Short Term Goals: 2-4 weeks. Patient will:  1. Be independent with initial HEP met  2. Be instructed in posture and body mechanics met  3. Demonstrate TrA contraction during exercises in clinic without need for cueing.     Long Term Goals: 4-6 weeks. Patient will:  1. Demonstrate improved Bilateral lower extremity/core MMT of  >/= 4+/5 to allow for return to recreational/daily activities without increased pain.  2. Demonstrate normal lower extremity flexibility to allow for walking/ADL's/performance of household tasks without pain.     4. Have no soft tissue restriction in involved musculature.  5. Report pain of </= 3 with all daily activities.  6. Perceived disability as measured on LEFS ./= 45  7. Be independent with long term HEP  Recommendations:  Continue as planned  Timeframe: 1 month  Prognosis to achieve goals: good    PT Signature: Naz Jordan, PT  [place] Lic. # [id number]      Based upon review of the patient's progress and continued therapy plan, it is my medical opinion that Mikey Crawley should continue physical therapy treatment at [facility]  [address] [facility], [address] [address] [phone] ; Fax Number [phone]    Signature: __________________________________  Jacinto Falcon MD    Manual Therapy:          mins  92321;  Therapeutic Exercise:       20   mins  52688;     Neuromuscular Tone:    10      mins  87969;    Therapeutic Activity:      8     mins  36074;     Gait Training:            mins  63201;     Ultrasound:           mins  19882;    Electrical Stimulation:          mins  05806 ( );  Dry Needling           mins self-pay  Traction           mins 33219  Canalith Repositioning         mins 48023      Timed Treatment:  38    mins   Total Treatment:      38  mins

## 2024-09-03 NOTE — TELEPHONE ENCOUNTER
Rx Refill Note  Requested Prescriptions     Pending Prescriptions Disp Refills    rosuvastatin (CRESTOR) 5 MG tablet [Pharmacy Med Name: Rosuvastatin Calcium 5 MG Oral Tablet] 90 tablet 1     Sig: TAKE 1 TABLET BY MOUTH DAILY      Last office visit with prescribing clinician: 8/29/2024   Last telemedicine visit with prescribing clinician: Visit date not found   Next office visit with prescribing clinician: 2/28/2025                         Would you like a call back once the refill request has been completed: [] Yes [] No    If the office needs to give you a call back, can they leave a voicemail: [] Yes [] No    Jyoti Guerin  09/03/24, 11:36 EDT

## 2024-09-05 ENCOUNTER — TREATMENT (OUTPATIENT)
Dept: PHYSICAL THERAPY | Facility: CLINIC | Age: 84
End: 2024-09-05
Payer: MEDICARE

## 2024-09-05 DIAGNOSIS — M25.551 BILATERAL HIP PAIN: Primary | ICD-10-CM

## 2024-09-05 DIAGNOSIS — R29.898 WEAKNESS OF BOTH HIPS: ICD-10-CM

## 2024-09-05 DIAGNOSIS — M25.552 BILATERAL HIP PAIN: Primary | ICD-10-CM

## 2024-09-05 NOTE — PROGRESS NOTES
Physical Therapy Daily Treatment Note  [facility] [place]   [address], [address]  [place] [address]  P: [phone]  F: [phone]    Patient: Mikey Crawley   : 1940  Referring practitioner: Jacinto Falcon MD  Date of Initial Visit: Type: THERAPY  Noted: 2024  Today's Date: 2024  Patient seen for 11 sessions       Visit Diagnoses:    ICD-10-CM ICD-9-CM   1. Bilateral hip pain  M25.551 719.45    M25.552    2. Weakness of both hips  R29.898 729.89         Mikey Crawley reports: had to park further away and noticed I didn't have increase in pain with the further walk today     Subjective     Objective   See Exercise, Manual, and Modality Logs for complete treatment.       Assessment/Plan Compliant/cooperative with current rehab efforts.  Benefits from verbal/tactile cues to ensure correct exercise performance/technique, hold time and position. Plan details: Progress ROM / strengthening / stabilization / functional activity as tolerated       Timed:         Manual Therapy:         mins  78140;     Therapeutic Exercise:  15       mins  81172;     Neuromuscular Tone:  15      mins  72404;    Therapeutic Activity:       8   mins  24071;     Gait Training:           mins  55395;     Ultrasound:          mins  67898;    Ionto                                   mins  83222  Self Care                            mins  68851  Traction          mins 11553      Un-Timed:  Canalith Repos         mins 36607  Electrical Stimulation:         mins  69621 ( );  Dry Needling          mins self-pay  Traction          mins 19319        Timed Treatment:    38  mins   Total Treatment:     48   mins    Naz Jordan PT  [place] License #: [id number]    Physical Therapist

## 2024-09-10 ENCOUNTER — TREATMENT (OUTPATIENT)
Dept: PHYSICAL THERAPY | Facility: CLINIC | Age: 84
End: 2024-09-10
Payer: MEDICARE

## 2024-09-10 DIAGNOSIS — M25.552 BILATERAL HIP PAIN: Primary | ICD-10-CM

## 2024-09-10 DIAGNOSIS — M25.551 BILATERAL HIP PAIN: Primary | ICD-10-CM

## 2024-09-10 DIAGNOSIS — R29.898 WEAKNESS OF BOTH HIPS: ICD-10-CM

## 2024-09-10 PROCEDURE — 97110 THERAPEUTIC EXERCISES: CPT | Performed by: PHYSICAL THERAPIST

## 2024-09-10 PROCEDURE — 97112 NEUROMUSCULAR REEDUCATION: CPT | Performed by: PHYSICAL THERAPIST

## 2024-09-10 NOTE — PROGRESS NOTES
Physical Therapy Daily Treatment Note  [facility] [place]   [address], [address]  [place] [address]  P: [phone]  F: [phone]    Patient: Mikey Crawley   : 1940  Referring practitioner: Jacinto Falcon MD  Date of Initial Visit: Type: THERAPY  Noted: 2024  Today's Date: 9/10/2024  Patient seen for 12 sessions       Visit Diagnoses:    ICD-10-CM ICD-9-CM   1. Bilateral hip pain  M25.551 719.45    M25.552    2. Weakness of both hips  R29.898 729.89         Mikey Crawley reports: working around house yesterday and having some stiffness and soreness, especially in R knee     Subjective     Objective   See Exercise, Manual, and Modality Logs for complete treatment.       Assessment/Plan decreased resistance with LE exercises due to new c/o R knee soreness and mild swelling. Plan details: Progress ROM / strengthening / stabilization / functional activity as tolerated       Timed:         Manual Therapy:         mins  23495;     Therapeutic Exercise:  20       mins  28791;     Neuromuscular Tone:   10     mins  19791;    Therapeutic Activity:          mins  27793;     Gait Training:           mins  17689;     Ultrasound:          mins  71208;    Ionto                                   mins  88123  Self Care                            mins  15192  Traction          mins 31018      Un-Timed:  Canalith Repos         mins 13565  Electrical Stimulation:         mins  90218 ( );  Dry Needling          mins self-pay  Traction          mins 79664        Timed Treatment:  30    mins   Total Treatment:    40    mins    Naz Jordan PT  [place] License #: [id number]    Physical Therapist

## 2024-09-12 ENCOUNTER — TREATMENT (OUTPATIENT)
Dept: PHYSICAL THERAPY | Facility: CLINIC | Age: 84
End: 2024-09-12
Payer: MEDICARE

## 2024-09-12 DIAGNOSIS — R29.898 WEAKNESS OF BOTH HIPS: ICD-10-CM

## 2024-09-12 DIAGNOSIS — M25.551 BILATERAL HIP PAIN: Primary | ICD-10-CM

## 2024-09-12 DIAGNOSIS — M25.552 BILATERAL HIP PAIN: Primary | ICD-10-CM

## 2024-09-12 NOTE — PROGRESS NOTES
Physical Therapy Daily Treatment Note  [facility] [place]   [address], [address]  [place] [address]  P: [phone]  F: [phone]    Patient: Mikey Crawley   : 1940  Referring practitioner: Jacinto Falcon MD  Date of Initial Visit: Type: THERAPY  Noted: 2024  Today's Date: 2024  Patient seen for 13 sessions       Visit Diagnoses:    ICD-10-CM ICD-9-CM   1. Bilateral hip pain  M25.551 719.45    M25.552    2. Weakness of both hips  R29.898 729.89         Mikey Crawley reports: L hip pain is better today     Subjective     Objective   See Exercise, Manual, and Modality Logs for complete treatment.       Assessment/Plan Compliant/cooperative with current rehab efforts.  Benefits from verbal/tactile cues to ensure correct exercise performance/technique, hold time and position. Plan details: Progress ROM / strengthening / stabilization / functional activity as tolerated       Timed:         Manual Therapy:         mins  29203;     Therapeutic Exercise:   15      mins  94050;     Neuromuscular Tone:     15   mins  34353;    Therapeutic Activity:      8    mins  70743;     Gait Training:          mins  24398;     Ultrasound:          mins  02017;    Ionto                                   mins  85849  Self Care                            mins  05515  Traction          mins 50895      Un-Timed:  Canalith Repos         mins 59583  Electrical Stimulation:         mins  42578 ( );  Dry Needling          mins self-pay  Traction          mins 74154        Timed Treatment:  38    mins   Total Treatment:     48   mins    Naz Jordan PT  [place] License #: [id number]    Physical Therapist

## 2024-09-13 ENCOUNTER — PROCEDURE VISIT (OUTPATIENT)
Dept: NEUROLOGY | Facility: CLINIC | Age: 84
End: 2024-09-13
Payer: MEDICARE

## 2024-09-13 DIAGNOSIS — M25.532 BILATERAL WRIST PAIN: Chronic | ICD-10-CM

## 2024-09-13 DIAGNOSIS — M54.12 CERVICAL RADICULOPATHY AT C5: ICD-10-CM

## 2024-09-13 DIAGNOSIS — G60.9 IDIOPATHIC PERIPHERAL NEUROPATHY: Primary | ICD-10-CM

## 2024-09-13 DIAGNOSIS — R20.2 PARESTHESIAS: ICD-10-CM

## 2024-09-13 DIAGNOSIS — M25.531 BILATERAL WRIST PAIN: Chronic | ICD-10-CM

## 2024-09-13 NOTE — PROGRESS NOTES
EMG and Nerve Conduction Studies    I.      Instrument used: Neuromax 1002  II.     Please see data sheets for tabular summary of NCS and details on methods, temperatures and lab standards.   III.    EMG muscles tested for upper extremity studies include the deltoid, biceps, triceps, pronator teres, extensor digitorum communis, first dorsal interosseous and abductor pollicis brevis.    IV.   EMG muscles tested for lower extremity studies include the vastus lateralis, tibialis anterior, peroneus longus, medial gastrocnemius and extensor digitorum brevis.    V.    Additional muscles tested as needed.  Paraspinal muscles tested as needed.   VI.   Please see data sheets for tabular summary of EMG findings.   VII. The complete report includes the data sheets.      Indication: Pain and numbness in the hands  History: 84-year-old male with pain and numbness in the hands.  He has significant osteoarthritis in the hands.  He also has neck pain.  He describes some numbness tingling and burning in the feet also.  He denies a history of diabetes or thyroid disease but has had some previously elevated hemoglobin A1c is in the prediabetes range.      Ht: 170.2 cm  Wt: 91.7 kg; BMI 31.67  HbA1C:   Lab Results   Component Value Date    HGBA1C 5.60 08/22/2024     TSH:   Lab Results   Component Value Date    TSH 2.480 04/28/2022       Technical summary:  Nerve conduction studies were obtained in both arms.  Skin temperatures were at least 32 °C measured on the palms.  Needle examination was obtained on selected muscles in both arms.    Results:  1.  Prolonged median antidromic sensory distal latencies bilaterally at 3.7 ms each with low amplitudes of 13.7 µV on the left and 14 µV on the right.  2.  Prolonged ulnar antidromic sensory distal latencies bilaterally at 3.9 ms on the left and 3.8 ms on the right with low amplitudes of 8 µV on the left and 10.3 µV on the right.  3.  Normal left radial sensory distal latency and  amplitude.  4.  Slow median motor conduction velocities bilaterally at 48 m/s on the left and 41.2 m/s on the right with normal distal latencies and amplitudes.  5.  Slow left ulnar motor conduction velocity in the short segment across the elbow at 38.5 m/s with a slow velocity below the elbow at 46.3 m/s.  Normal distal latency and amplitudes.  Slow right ulnar motor conduction velocity below the elbow at 43.2 m/s with normal velocity in the short segment across the fibular head.  Normal distal latency and amplitudes.  6.  Needle examination of selected muscles in both arms showed fibrillations and positive sharp waves in the left deltoid with normal-appearing motor units and essentially full recruitment.  The remaining muscles tested showed normal insertional activities with normal motor units and recruitment patterns.  Cervical paraspinals at C5/6 showed no abnormality on either side.    Impression:  Abnormal study consistent with peripheral neuropathy.  There were some acute denervation changes in the left deltoid consistent with an axillary neuropathy or C5 (or perhaps C6) radiculopathy.  Clinical correlation is suggested.  Study results were discussed with the patient.    Brendan Maldonado M.D.              Dictated utilizing Dragon dictation.

## 2024-09-17 ENCOUNTER — TREATMENT (OUTPATIENT)
Dept: PHYSICAL THERAPY | Facility: CLINIC | Age: 84
End: 2024-09-17
Payer: MEDICARE

## 2024-09-17 DIAGNOSIS — R29.898 WEAKNESS OF BOTH HIPS: ICD-10-CM

## 2024-09-17 DIAGNOSIS — M25.551 BILATERAL HIP PAIN: Primary | ICD-10-CM

## 2024-09-17 DIAGNOSIS — M25.552 BILATERAL HIP PAIN: Primary | ICD-10-CM

## 2024-09-19 ENCOUNTER — TREATMENT (OUTPATIENT)
Dept: PHYSICAL THERAPY | Facility: CLINIC | Age: 84
End: 2024-09-19
Payer: MEDICARE

## 2024-09-19 DIAGNOSIS — M25.552 BILATERAL HIP PAIN: Primary | ICD-10-CM

## 2024-09-19 DIAGNOSIS — M25.551 BILATERAL HIP PAIN: Primary | ICD-10-CM

## 2024-09-19 DIAGNOSIS — R29.898 WEAKNESS OF BOTH HIPS: ICD-10-CM

## 2024-09-24 ENCOUNTER — TREATMENT (OUTPATIENT)
Dept: PHYSICAL THERAPY | Facility: CLINIC | Age: 84
End: 2024-09-24
Payer: MEDICARE

## 2024-09-24 ENCOUNTER — TELEPHONE (OUTPATIENT)
Dept: SPORTS MEDICINE | Facility: CLINIC | Age: 84
End: 2024-09-24
Payer: MEDICARE

## 2024-09-24 DIAGNOSIS — M25.552 BILATERAL HIP PAIN: Primary | ICD-10-CM

## 2024-09-24 DIAGNOSIS — R29.898 WEAKNESS OF BOTH HIPS: ICD-10-CM

## 2024-09-24 DIAGNOSIS — M25.551 BILATERAL HIP PAIN: Primary | ICD-10-CM

## 2024-09-26 ENCOUNTER — TREATMENT (OUTPATIENT)
Dept: PHYSICAL THERAPY | Facility: CLINIC | Age: 84
End: 2024-09-26
Payer: MEDICARE

## 2024-09-26 DIAGNOSIS — M25.551 BILATERAL HIP PAIN: Primary | ICD-10-CM

## 2024-09-26 DIAGNOSIS — R29.898 WEAKNESS OF BOTH HIPS: ICD-10-CM

## 2024-09-26 DIAGNOSIS — M25.552 BILATERAL HIP PAIN: Primary | ICD-10-CM

## 2024-09-30 ENCOUNTER — OFFICE VISIT (OUTPATIENT)
Dept: SPORTS MEDICINE | Facility: CLINIC | Age: 84
End: 2024-09-30
Payer: MEDICARE

## 2024-09-30 VITALS
WEIGHT: 202 LBS | BODY MASS INDEX: 31.71 KG/M2 | HEART RATE: 68 BPM | DIASTOLIC BLOOD PRESSURE: 82 MMHG | RESPIRATION RATE: 16 BRPM | OXYGEN SATURATION: 97 % | SYSTOLIC BLOOD PRESSURE: 138 MMHG | HEIGHT: 67 IN | TEMPERATURE: 98 F

## 2024-09-30 DIAGNOSIS — M25.552 BILATERAL HIP PAIN: ICD-10-CM

## 2024-09-30 DIAGNOSIS — M25.551 BILATERAL HIP PAIN: ICD-10-CM

## 2024-09-30 DIAGNOSIS — M50.30 DDD (DEGENERATIVE DISC DISEASE), CERVICAL: ICD-10-CM

## 2024-09-30 DIAGNOSIS — M18.0 PRIMARY OSTEOARTHRITIS OF BOTH FIRST CARPOMETACARPAL JOINTS: Primary | ICD-10-CM

## 2024-09-30 PROCEDURE — 3079F DIAST BP 80-89 MM HG: CPT | Performed by: FAMILY MEDICINE

## 2024-09-30 PROCEDURE — 99213 OFFICE O/P EST LOW 20 MIN: CPT | Performed by: FAMILY MEDICINE

## 2024-09-30 PROCEDURE — 20600 DRAIN/INJ JOINT/BURSA W/O US: CPT | Performed by: FAMILY MEDICINE

## 2024-09-30 PROCEDURE — 3075F SYST BP GE 130 - 139MM HG: CPT | Performed by: FAMILY MEDICINE

## 2024-09-30 NOTE — PROGRESS NOTES
"Mikey is a 84 y.o. year old male     Chief Complaint   Patient presents with    nerve test     Pt would like to discuss EMG results.        History of Present Illness  History of Present Illness  The patient is here today to follow up on bilateral hand pain.    Since his last visit, he underwent a nerve conduction study which revealed evidence of peripheral neuropathy and possible cervical radiculopathy. Currently, he is experiencing pain in both hands, primarily around the base of the thumb on the wrist side. The pain is described as aching in nature. He denies any recent significant numbness or tingling. The pain intensifies with use of his hands.    I have reviewed the patient's medical, family, and social history in detail and updated the computerized patient record.    Review of Systems    /82 (BP Location: Right arm, Patient Position: Sitting, Cuff Size: Adult)   Pulse 68   Temp 98 °F (36.7 °C)   Resp 16   Ht 170.2 cm (67\")   Wt 91.6 kg (202 lb)   SpO2 97%   BMI 31.64 kg/m²      Physical Exam    Vital signs reviewed.   General: No acute distress.      Physical Exam  Visible arthritic changes are observed throughout both hands, particularly with hypertrophic change around the CMC joint on the left side.    Results  Results  Imaging  Previous x-rays of the hand and wrist showed extensive osteoarthritis, particularly at the first CMC joint bilaterally.    Testing  Nerve conduction study showed evidence of peripheral neuropathy and possible cervical radiculopathy.    - Small Joint Arthrocentesis: bilateral thumb CMC on 9/30/2024 3:16 PM  Details: 30 G needle  Medications (Right): 1 mL lidocaine 1 %, 0.5 mL triamcinolone acetonide 40 MG/ML  Medications (Left): 1 mL lidocaine 1 %, 0.5 mL triamcinolone acetonide 40 MG/ML  Outcome: tolerated well, no immediate complications  Procedure, treatment alternatives, risks and benefits explained, specific risks discussed. Immediately prior to procedure a time " out was called to verify the correct patient, procedure, equipment, support staff and site/side marked as required. Patient was prepped and draped in the usual sterile fashion.            Diagnoses and all orders for this visit:    Primary osteoarthritis of both first carpometacarpal joints    DDD (degenerative disc disease), cervical    Bilateral hip pain      Assessment & Plan  1. Bilateral hand pain.  He is experiencing pain in both hands, primarily around the base of the thumb on the wrist side, which is more aching in nature. There is no significant numbness or tingling recently, but the pain worsens with use of the hands. Physical exam shows obvious arthritic changes throughout both hands, particularly with hypertrophic change around the CMC joint on the left side. Previous x-rays show extensive osteoarthritis, particularly at the first CMC joint bilaterally. Injections to bilateral first CMC joint were performed and tolerated well. If there is no significant improvement, a repeat cervical MRI will be considered to follow up on his degenerative changes and possible radicular findings on his nerve conduction study. Consultation with hand surgery for other treatment options for his diffuse hand arthritis will also be considered.    2. Peripheral neuropathy.  Nerve conduction study shows some evidence of peripheral neuropathy. He does not report significant numbness or tingling recently. Monitoring of symptoms will continue, and further evaluation will be based on clinical progression.    3. Possible cervical radiculopathy.  Nerve conduction study indicates possible cervical radiculopathy. If there is no significant improvement with the current treatment, a repeat cervical MRI will be considered to further evaluate degenerative changes and radicular findings.        Patient or patient representative verbalized consent for the use of Ambient Listening during the visit with  Jacinto Falcon MD for chart  documentation. 9/30/2024  15:17 EDT    *Dictated after leaving exam room.     Jacinto Falcon MD   15:16 EDT   09/30/24

## 2024-11-01 ENCOUNTER — HOSPITAL ENCOUNTER (OUTPATIENT)
Dept: CT IMAGING | Facility: HOSPITAL | Age: 84
Discharge: HOME | End: 2024-11-01
Payer: MEDICARE

## 2024-11-01 DIAGNOSIS — R91.1 NODULE OF UPPER LOBE OF LEFT LUNG: ICD-10-CM

## 2024-11-01 DIAGNOSIS — I77.810 DILATED AORTIC ROOT: ICD-10-CM

## 2024-11-01 PROCEDURE — 71250 CT THORAX DX C-: CPT

## 2024-11-04 RX ORDER — AMLODIPINE AND OLMESARTAN MEDOXOMIL 5; 40 MG/1; MG/1
1 TABLET ORAL DAILY
Qty: 90 TABLET | Refills: 3 | Status: SHIPPED | OUTPATIENT
Start: 2024-11-04

## 2024-11-04 NOTE — TELEPHONE ENCOUNTER
Rx Refill Note  Requested Prescriptions     Pending Prescriptions Disp Refills    sertraline (ZOLOFT) 50 MG tablet [Pharmacy Med Name: Sertraline HCl 50 MG Oral Tablet] 90 tablet 1     Sig: TAKE 1 TABLET BY MOUTH DAILY      Last office visit with prescribing clinician: Visit date not found   Last telemedicine visit with prescribing clinician: Visit date not found   Next office visit with prescribing clinician: Visit date not found                         Would you like a call back once the refill request has been completed: [] Yes [] No    If the office needs to give you a call back, can they leave a voicemail: [] Yes [] No    Mann Beckham MA  11/04/24, 10:40 EST

## 2024-11-06 ENCOUNTER — TELEPHONE (OUTPATIENT)
Dept: CARDIOLOGY | Facility: CLINIC | Age: 84
End: 2024-11-06
Payer: MEDICARE

## 2024-11-06 NOTE — TELEPHONE ENCOUNTER
I spoke with Mikey Crawley and updated pt on results/recommendations from provider.  Pt verbalized understanding and has no further questions at this time.    Thank you,    Evette ENNIS, RN  Triage [facility]  11/06/24 12:16 EST

## 2024-11-06 NOTE — TELEPHONE ENCOUNTER
Please inform patient aorta remains stable measuring 4.2 cm and mildly dilated.  Lung nodule remain stable and no new lung nodules are seen.

## 2024-11-14 PROCEDURE — 93294 REM INTERROG EVL PM/LDLS PM: CPT | Performed by: INTERNAL MEDICINE

## 2024-11-14 PROCEDURE — 93296 REM INTERROG EVL PM/IDS: CPT | Performed by: INTERNAL MEDICINE

## 2024-12-04 RX ORDER — HYDROCHLOROTHIAZIDE 12.5 MG/1
12.5 TABLET ORAL DAILY
Qty: 90 TABLET | Refills: 1 | Status: SHIPPED | OUTPATIENT
Start: 2024-12-04

## 2024-12-04 NOTE — TELEPHONE ENCOUNTER
Rx Refill Note  Requested Prescriptions     Pending Prescriptions Disp Refills    hydroCHLOROthiazide 12.5 MG tablet [Pharmacy Med Name: hydroCHLOROthiazide 12.5 MG Oral Tablet] 90 tablet 1     Sig: TAKE 1 TABLET BY MOUTH DAILY      Last office visit with prescribing clinician: 8/29/2024   Last telemedicine visit with prescribing clinician: Visit date not found   Next office visit with prescribing clinician: 2/28/2025                         Would you like a call back once the refill request has been completed: [] Yes [] No    If the office needs to give you a call back, can they leave a voicemail: [] Yes [] No    Jyoti Guerin  12/04/24, 10:23 EST

## 2025-01-27 RX ORDER — ROSUVASTATIN CALCIUM 5 MG/1
5 TABLET, COATED ORAL DAILY
Qty: 90 TABLET | Refills: 1 | Status: SHIPPED | OUTPATIENT
Start: 2025-01-27

## 2025-01-27 NOTE — TELEPHONE ENCOUNTER
Rx Refill Note  Requested Prescriptions     Pending Prescriptions Disp Refills    rosuvastatin (CRESTOR) 5 MG tablet [Pharmacy Med Name: Rosuvastatin Calcium 5 MG Oral Tablet] 90 tablet 1     Sig: TAKE 1 TABLET BY MOUTH DAILY      Last office visit with prescribing clinician: 8/29/2024   Last telemedicine visit with prescribing clinician: Visit date not found   Next office visit with prescribing clinician: 2/28/2025                         Would you like a call back once the refill request has been completed: [] Yes [] No    If the office needs to give you a call back, can they leave a voicemail: [] Yes [] No    Jyoti Guerin  01/27/25, 09:18 EST

## 2025-02-15 PROCEDURE — 93294 REM INTERROG EVL PM/LDLS PM: CPT | Performed by: INTERNAL MEDICINE

## 2025-02-15 PROCEDURE — 93296 REM INTERROG EVL PM/IDS: CPT | Performed by: INTERNAL MEDICINE

## 2025-03-31 NOTE — TELEPHONE ENCOUNTER
Rx Refill Note  Requested Prescriptions     Pending Prescriptions Disp Refills    sertraline (ZOLOFT) 50 MG tablet [Pharmacy Med Name: Sertraline HCl 50 MG Oral Tablet] 90 tablet 3     Sig: TAKE 1 TABLET BY MOUTH DAILY      Last office visit with prescribing clinician: 8/29/2024   Last telemedicine visit with prescribing clinician: Visit date not found   Next office visit with prescribing clinician: 4/3/2025     Jose Antonio Joseph MA  03/31/25, 09:07 EDT

## 2025-04-03 ENCOUNTER — OFFICE VISIT (OUTPATIENT)
Dept: FAMILY MEDICINE CLINIC | Facility: CLINIC | Age: 85
End: 2025-04-03
Payer: MEDICARE

## 2025-04-03 VITALS
DIASTOLIC BLOOD PRESSURE: 62 MMHG | WEIGHT: 203.9 LBS | HEART RATE: 68 BPM | BODY MASS INDEX: 32 KG/M2 | SYSTOLIC BLOOD PRESSURE: 158 MMHG | OXYGEN SATURATION: 97 % | RESPIRATION RATE: 18 BRPM | HEIGHT: 67 IN

## 2025-04-03 DIAGNOSIS — E78.2 MIXED HYPERLIPIDEMIA: ICD-10-CM

## 2025-04-03 DIAGNOSIS — F33.42 RECURRENT MAJOR DEPRESSIVE DISORDER, IN FULL REMISSION: ICD-10-CM

## 2025-04-03 DIAGNOSIS — R73.01 IMPAIRED FASTING GLUCOSE: ICD-10-CM

## 2025-04-03 DIAGNOSIS — I10 ESSENTIAL HYPERTENSION: Primary | ICD-10-CM

## 2025-04-03 DIAGNOSIS — M15.0 PRIMARY OSTEOARTHRITIS INVOLVING MULTIPLE JOINTS: Chronic | ICD-10-CM

## 2025-04-03 NOTE — PROGRESS NOTES
"Chief Complaint  Hypertension    Subjective        Mikey Crawley presents to [facility]  Back Pain  This is a recurrent problem. The current episode started more than 1 year ago. The problem occurs daily. The problem is unchanged. The pain is present in the sacro-iliac. The quality of the pain is described as aching and stabbing. The pain radiates to the left buttock and right buttock. The pain is at a severity of 5/10. The pain is Worse during the day. The symptoms are aggravated by position. Stiffness is present All day. Associated symptoms include weakness. Pertinent negatives include no abdominal pain, bladder incontinence, bowel incontinence, chest pain, dysuria, fever, leg pain, numbness, paresthesias, pelvic pain, perianal numbness, tingling or weight loss. Risk factors include lack of exercise, obesity and sedentary lifestyle.     Hypertension follow-up.  Continues amlodipine olmesartan, atenolol, and low-dose hydrochlorothiazide.  Creatinine normal per electrolytes normal.  Blood pressure elevated today.  Previously only borderline elevated.  At home with been elevated recently.  Is under considerable stressors.  His wife has dementia and is becoming more problematic.    Chronic back pain.  Also osteoarthritis of multiple joints.  He did not have much relief with NSAIDs such as meloxicam and Celebrex.  He is not taking Tylenol.  He took one of his wife's hydrocodone, did not help much.  He is having worsening depression.  Most related to stressors.  He continues on sertraline 50 mg daily.    Impaired fasting glucose.  Overall improving.  A1c minimally elevated.    Objective   Vital Signs:  /62   Pulse 68   Resp 18   Ht 170.2 cm (67\")   Wt 92.5 kg (203 lb 14.4 oz)   SpO2 97%   BMI 31.94 kg/m²   Estimated body mass index is 31.94 kg/m² as calculated from the following:    Height as of this encounter: 170.2 cm (67\").    Weight as of this encounter: 92.5 kg (203 lb " 14.4 oz).            Physical Exam  Vitals and nursing note reviewed.   Constitutional:       General: He is not in acute distress.     Appearance: He is well-developed.   Cardiovascular:      Rate and Rhythm: Normal rate and regular rhythm.      Heart sounds: Normal heart sounds.   Pulmonary:      Effort: Pulmonary effort is normal.      Breath sounds: Normal breath sounds.   Skin:     General: Skin is warm and dry.   Psychiatric:      Comments: Mood is mildly depressed        Result Review :  The following data was reviewed by: Sotero Torres MD on 04/03/2025:  Common labs          8/22/2024    09:31 3/26/2025    10:14   Common Labs   Glucose 103  96    BUN 13  24    Creatinine 1.09  0.98    Sodium 139  143    Potassium 4.6  4.2    Chloride 101  105    Calcium 9.6  9.1    Albumin 4.5  4.3    Total Bilirubin 0.5  0.5    Alkaline Phosphatase 72  61    AST (SGOT) 22  21    ALT (SGPT) 22  19    WBC  7.86    Hemoglobin  14.4    Hematocrit  45.1    Platelets  206    Total Cholesterol 150  122    Triglycerides 123  110    HDL Cholesterol 43  38    LDL Cholesterol  85  64    Hemoglobin A1C 5.60  5.70                  Assessment and Plan   Diagnoses and all orders for this visit:    1. Essential hypertension (Primary)  -     Hemoglobin A1c; Future  -     Comprehensive Metabolic Panel; Future    2. Mixed hyperlipidemia  -     Hemoglobin A1c; Future  -     Comprehensive Metabolic Panel; Future    3. Impaired fasting glucose  -     Hemoglobin A1c; Future  -     Comprehensive Metabolic Panel; Future    4. Recurrent major depressive disorder, in full remission    5. Primary osteoarthritis involving multiple joints    Other orders  -     sertraline (ZOLOFT) 50 MG tablet; Take 1.5 tablets by mouth Daily.  Dispense: 135 tablet; Refill: 3    Hypertension.  Only fair control.  At this time I do not recommend any changes.  Is been elevated somewhat lately due to his stressors.  But overall the last 4 visits in our office, he has  been only borderline high.  Continue current medication.  See me 3 months.  Check blood pressure at home.  If getting much higher he will let us know.    Major depression.  Previously in remission.  Now with partial remission.  I want to increase his sertraline from 50 mg to up to 75 mg a day.  1-1/2.  He states he has a pill splitter.  Counseling given.  See me 3 months.    Osteoarthritis of multiple joints.  I recommend over-the-counter Tylenol acetaminophen 500 mg 2 tablets twice a day.        Impaired fasting glucose.  Continue to monitor A1c.         Follow Up   Return in about 3 months (around 7/3/2025) for Lab Visit One Week Prior to Next Appointment, Recheck.  Patient was given instructions and counseling regarding his condition or for health maintenance advice. Please see specific information pulled into the AVS if appropriate.

## 2025-05-01 RX ORDER — HYDROCHLOROTHIAZIDE 12.5 MG/1
12.5 TABLET ORAL DAILY
Qty: 90 TABLET | Refills: 1 | Status: SHIPPED | OUTPATIENT
Start: 2025-05-01

## 2025-05-01 NOTE — TELEPHONE ENCOUNTER
Rx Refill Note  Requested Prescriptions     Pending Prescriptions Disp Refills    hydroCHLOROthiazide 12.5 MG tablet [Pharmacy Med Name: hydroCHLOROthiazide 12.5 MG Oral Tablet] 90 tablet 1     Sig: TAKE 1 TABLET BY MOUTH DAILY      Last office visit with prescribing clinician: 4/3/2025   Last telemedicine visit with prescribing clinician: Visit date not found   Next office visit with prescribing clinician: 7/3/2025                         Would you like a call back once the refill request has been completed: [] Yes [] No    If the office needs to give you a call back, can they leave a voicemail: [] Yes [] No    Jose Antonio Joseph MA  05/01/25, 08:18 EDT

## 2025-05-23 RX ORDER — ATENOLOL 50 MG/1
50 TABLET ORAL
Qty: 90 TABLET | Refills: 3 | Status: SHIPPED | OUTPATIENT
Start: 2025-05-23

## 2025-06-16 ENCOUNTER — OFFICE VISIT (OUTPATIENT)
Dept: CARDIOLOGY | Age: 85
End: 2025-06-16
Payer: MEDICARE

## 2025-06-16 ENCOUNTER — TELEPHONE (OUTPATIENT)
Age: 85
End: 2025-06-16
Payer: MEDICARE

## 2025-06-16 ENCOUNTER — CLINICAL SUPPORT NO REQUIREMENTS (OUTPATIENT)
Age: 85
End: 2025-06-16
Payer: MEDICARE

## 2025-06-16 VITALS
HEIGHT: 67 IN | OXYGEN SATURATION: 97 % | HEART RATE: 73 BPM | SYSTOLIC BLOOD PRESSURE: 132 MMHG | BODY MASS INDEX: 32.24 KG/M2 | WEIGHT: 205.4 LBS | DIASTOLIC BLOOD PRESSURE: 80 MMHG

## 2025-06-16 DIAGNOSIS — I49.5 SICK SINUS SYNDROME: Primary | ICD-10-CM

## 2025-06-16 DIAGNOSIS — Z95.0 PRESENCE OF CARDIAC PACEMAKER: ICD-10-CM

## 2025-06-16 DIAGNOSIS — I10 ESSENTIAL HYPERTENSION: Chronic | ICD-10-CM

## 2025-06-16 DIAGNOSIS — E78.2 MIXED HYPERLIPIDEMIA: Chronic | ICD-10-CM

## 2025-06-16 PROCEDURE — 1159F MED LIST DOCD IN RCRD: CPT | Performed by: INTERNAL MEDICINE

## 2025-06-16 PROCEDURE — 93000 ELECTROCARDIOGRAM COMPLETE: CPT | Performed by: INTERNAL MEDICINE

## 2025-06-16 PROCEDURE — 1160F RVW MEDS BY RX/DR IN RCRD: CPT | Performed by: INTERNAL MEDICINE

## 2025-06-16 PROCEDURE — 3075F SYST BP GE 130 - 139MM HG: CPT | Performed by: INTERNAL MEDICINE

## 2025-06-16 PROCEDURE — 3079F DIAST BP 80-89 MM HG: CPT | Performed by: INTERNAL MEDICINE

## 2025-06-16 PROCEDURE — 99214 OFFICE O/P EST MOD 30 MIN: CPT | Performed by: INTERNAL MEDICINE

## 2025-06-16 RX ORDER — VIBEGRON 75 MG/1
TABLET, FILM COATED ORAL
COMMUNITY
Start: 2025-05-22

## 2025-06-16 RX ORDER — ATENOLOL 50 MG/1
50 TABLET ORAL
Qty: 90 TABLET | Refills: 3 | Status: SHIPPED | OUTPATIENT
Start: 2025-06-16

## 2025-06-16 RX ORDER — AMLODIPINE AND OLMESARTAN MEDOXOMIL 5; 40 MG/1; MG/1
1 TABLET ORAL DAILY
Qty: 90 TABLET | Refills: 3 | Status: SHIPPED | OUTPATIENT
Start: 2025-06-16

## 2025-06-16 NOTE — TELEPHONE ENCOUNTER
This is a former Dr. Forde pt that is now being followed by Dr. Orlando.     Pt has a dual chamber pacemaker with leads from 2011 and a gen change that was done on 6/10/2022.     RA threshold appears to result higher per hx.     At last IOC, RA manual threshold was 2.25V @ 0.4ms.     Listed below are the manual thresholds from today that appear to be variable:    3.5V @ 0.4ms  2.75V @ 0.6ms, 3.25V @ 0.6ms  2.5V @ 1ms, 2.75V @ 1ms, 3V @ 1ms X 2    Underlying rhythm was AP/VS, AS/VS 40s at DDD 40 bpm. Pt with hx of SSS. Since 6/18/2024, pt RA paced 96.9%.     I ended up increasing the RA pulse width from 0.4ms to 1ms and kept the amplitude at 4V, which gave a 1V safety margin. This did decrease the battery from 7.2 years to 5.1 years.     RV thresholds result WNL for pt. Manual threshold today of 1.25V @ 0.4ms. RV paced 10.9%.     Current settings:        Device and lead info:            Pt is going to come back in 6 months for device check. Please let me know if we should see the pt sooner than this and if there are other changes you would like made.

## 2025-06-16 NOTE — PROGRESS NOTES
"      CARDIOLOGY    Michelle Garcia MD    ENCOUNTER DATE:  06/16/2025    Mikey Crawley / 84 y.o. / male        CHIEF COMPLAINT / REASON FOR OFFICE VISIT     Follow-up (1 yr)      HISTORY OF PRESENT ILLNESS       HPI    Mikey Crawley is a 84 y.o. male     This is a patient previously followed by Dr. Rodriguez.  He has a history of sick sinus syndrome and is status post pacemaker.      He had a fall a couple weeks ago when he was bending over checking the water in his pool.  He fell pretty hard on his right shoulder.  He notes that he has weakness in his core but from a heart standpoint he does not have any complaints.  No presyncope or syncope.     He had his device check today and there was a possible issue with the wire to the right atrium.  Some adjustments were made and a message was sent to Dr. Orlando to review.      VITAL SIGNS     Visit Vitals  /80   Pulse 73   Ht 170.2 cm (67\")   Wt 93.2 kg (205 lb 6.4 oz)   SpO2 97%   BMI 32.17 kg/m²         Wt Readings from Last 3 Encounters:   06/16/25 93.2 kg (205 lb 6.4 oz)   04/03/25 92.5 kg (203 lb 14.4 oz)   09/30/24 91.6 kg (202 lb)     Body mass index is 32.17 kg/m².      PHYSICAL EXAMINATION     Constitutional:       General: Not in acute distress.  Neck:      Vascular: No carotid bruit or JVD.   Pulmonary:      Effort: Pulmonary effort is normal.      Breath sounds: Normal breath sounds.   Cardiovascular:      Normal rate. Regular rhythm.      Murmurs: There is no murmur.   Psychiatric:         Mood and Affect: Mood and affect normal.           REVIEWED DATA       ECG 12 Lead    Date/Time: 6/16/2025 11:26 AM  Performed by: Michelle Garcia MD    Authorized by: Michelle Garcia MD  Comparison: compared with previous ECG from 6/9/2023  Rhythm: paced  BPM: 73  Conduction: conduction normal  ST Segments: ST segments normal  T Waves: T waves normal    Clinical impression: abnormal EKG            Lipid Panel          8/22/2024    09:31 3/26/2025    10:14 "   Lipid Panel   Total Cholesterol 150  122    Triglycerides 123  110    HDL Cholesterol 43  38    VLDL Cholesterol 22  20    LDL Cholesterol  85  64        Lab Results   Component Value Date    GLUCOSE 96 03/26/2025    BUN 24 (H) 03/26/2025    CREATININE 0.98 03/26/2025     03/26/2025    K 4.2 03/26/2025     03/26/2025    CALCIUM 9.1 03/26/2025    PROTEINTOT 7.1 03/26/2025    ALBUMIN 4.3 03/26/2025    ALT 19 03/26/2025    AST 21 03/26/2025    ALKPHOS 61 03/26/2025    BILITOT 0.5 03/26/2025    GLOB 2.8 03/26/2025    AGRATIO 1.5 03/26/2025    BCR 24.5 03/26/2025    ANIONGAP 8.0 01/05/2023    EGFR 76.0 03/26/2025       ASSESSMENT & PLAN      Diagnosis Plan   1. Sick sinus syndrome        2. Mixed hyperlipidemia        3. Presence of cardiac pacemaker        4. Essential hypertension              1.  Sick sinus syndrome status post dual-chamber pacemaker.  Check pacemaker today.  There was possibly an issue with the right atrial wire.  Some changes were made today and a message was sent to Dr. Orlando.  I encouraged him to call me if he has any presyncope or syncope.  I will have him follow-up with Amanda in 6 months with pacemaker check.  2.  Hypertension.  Well controlled.  3.  Chronic kidney disease.  4.  Dizzy spells.  I cut back on his diuretic previously.  This is not currently an issue.  5.  Core weakness with falls.  I gave him our handout for seated exercises but told him he would benefit from physical therapy.  He is going to consider physical therapy in the future.    Follow-up with Amanda in 6 months with a pacemaker check.      Orders Placed This Encounter   Procedures    ECG 12 Lead     This order was created via procedure documentation     Release to patient:   Routine Release [[id number]]           MEDICATIONS         Discharge Medications            Accurate as of June 16, 2025 11:28 AM. If you have any questions, ask your nurse or doctor.                Continue These Medications         Instructions Start Date   amlodipine-olmesartan 5-40 MG per tablet  Commonly known as: AMAYA   1 tablet, Oral, Daily      atenolol 50 MG tablet  Commonly known as: TENORMIN   50 mg, Oral, Every Early Morning      finasteride 5 MG tablet  Commonly known as: PROSCAR   5 mg, Daily      Gemtesa 75 MG tablet  Generic drug: Vibegron       hydroCHLOROthiazide 12.5 MG tablet   12.5 mg, Oral, Daily      rosuvastatin 5 MG tablet  Commonly known as: CRESTOR   5 mg, Oral, Daily      sertraline 50 MG tablet  Commonly known as: ZOLOFT   75 mg, Oral, Daily      tamsulosin 0.4 MG capsule 24 hr capsule  Commonly known as: FLOMAX   2 capsules, Nightly                 Michelle Garcia MD  06/16/25  11:28 EDT    Part of this note may be an electronic transcription/translation of spoken language to printed text using the Dragon dictation system.

## 2025-06-24 RX ORDER — ROSUVASTATIN CALCIUM 5 MG/1
5 TABLET, COATED ORAL DAILY
Qty: 90 TABLET | Refills: 1 | Status: SHIPPED | OUTPATIENT
Start: 2025-06-24

## 2025-06-24 NOTE — TELEPHONE ENCOUNTER
Rx Refill Note  Requested Prescriptions     Pending Prescriptions Disp Refills    rosuvastatin (CRESTOR) 5 MG tablet [Pharmacy Med Name: Rosuvastatin Calcium 5 MG Oral Tablet] 90 tablet 1     Sig: TAKE 1 TABLET BY MOUTH DAILY      Last office visit with prescribing clinician: 4/3/2025   Last telemedicine visit with prescribing clinician: Visit date not found   Next office visit with prescribing clinician: 7/2/2025                         Would you like a call back once the refill request has been completed: [] Yes [] No    If the office needs to give you a call back, can they leave a voicemail: [] Yes [] No    Jyoti Guerin  06/24/25, 08:43 EDT

## 2025-07-02 ENCOUNTER — OFFICE VISIT (OUTPATIENT)
Dept: FAMILY MEDICINE CLINIC | Facility: CLINIC | Age: 85
End: 2025-07-02
Payer: MEDICARE

## 2025-07-02 VITALS
OXYGEN SATURATION: 96 % | BODY MASS INDEX: 31.91 KG/M2 | HEIGHT: 67 IN | WEIGHT: 203.3 LBS | HEART RATE: 70 BPM | SYSTOLIC BLOOD PRESSURE: 122 MMHG | RESPIRATION RATE: 17 BRPM | DIASTOLIC BLOOD PRESSURE: 68 MMHG

## 2025-07-02 DIAGNOSIS — F33.42 RECURRENT MAJOR DEPRESSIVE DISORDER, IN FULL REMISSION: Chronic | ICD-10-CM

## 2025-07-02 DIAGNOSIS — R79.9 ABNORMAL FINDING OF BLOOD CHEMISTRY, UNSPECIFIED: ICD-10-CM

## 2025-07-02 DIAGNOSIS — M15.0 PRIMARY OSTEOARTHRITIS INVOLVING MULTIPLE JOINTS: ICD-10-CM

## 2025-07-02 DIAGNOSIS — E78.2 MIXED HYPERLIPIDEMIA: Chronic | ICD-10-CM

## 2025-07-02 DIAGNOSIS — I10 ESSENTIAL HYPERTENSION: Primary | Chronic | ICD-10-CM

## 2025-07-02 NOTE — PROGRESS NOTES
"Chief Complaint  Hypertension    Subjective        Mikey Crawley presents to [facility]  History of Present Illness    Follow-up hypertension.  Blood pressure have been running elevated.  He is improving since his mood is improved.  At last visit I increased his sertraline up to 75 mg daily.  He states it is helping.  He has stressors caring for his wife who has dementia.  He is having ongoing issues with osteoarthritis of the hands and wrists.  Negative rheumatoid workup in the past.  Was seen by sports medicine.  Celebrex did not help.  Continues Exer strength Tylenol.  Taking 2 or 3 a day maximum.  Mood overall better now.  Blood pressure improved.    Objective   Vital Signs:  /68   Pulse 70   Resp 17   Ht 170.2 cm (67\")   Wt 92.2 kg (203 lb 4.8 oz)   SpO2 96%   BMI 31.84 kg/m²   Estimated body mass index is 31.84 kg/m² as calculated from the following:    Height as of this encounter: 170.2 cm (67\").    Weight as of this encounter: 92.2 kg (203 lb 4.8 oz).            Physical Exam  Vitals and nursing note reviewed.   Constitutional:       General: He is not in acute distress.     Appearance: He is well-developed.   Cardiovascular:      Rate and Rhythm: Normal rate and regular rhythm.      Heart sounds: Normal heart sounds.   Pulmonary:      Effort: Pulmonary effort is normal.      Breath sounds: Normal breath sounds.   Musculoskeletal:         General: Tenderness and deformity present. No swelling.   Skin:     General: Skin is warm and dry.   Psychiatric:         Mood and Affect: Mood normal.        Result Review :  The following data was reviewed by: Sotero Torres MD on 07/02/2025:  Common labs          8/22/2024    09:31 3/26/2025    10:14 6/26/2025    09:36   Common Labs   Glucose 103  96  106    BUN 13  24  14.0    Creatinine 1.09  0.98  1.18    Sodium 139  143  142    Potassium 4.6  4.2  4.7    Chloride 101  105  105    Calcium 9.6  9.1  9.2    Albumin 4.5  " 4.3  4.4    Total Bilirubin 0.5  0.5  0.3    Alkaline Phosphatase 72  61  72    AST (SGOT) 22  21  23    ALT (SGPT) 22  19  23    WBC  7.86     Hemoglobin  14.4     Hematocrit  45.1     Platelets  206     Total Cholesterol 150  122     Triglycerides 123  110     HDL Cholesterol 43  38     LDL Cholesterol  85  64     Hemoglobin A1C 5.60  5.70  5.40                  Assessment and Plan   Diagnoses and all orders for this visit:    1. Essential hypertension (Primary)  -     Hemoglobin A1c; Future  -     CBC & Differential; Future  -     Comprehensive Metabolic Panel; Future  -     Lipid Panel; Future  -     Urinalysis With Microscopic If Indicated (No Culture) - Urine, Clean Catch; Future    2. Mixed hyperlipidemia  -     Hemoglobin A1c; Future  -     CBC & Differential; Future  -     Comprehensive Metabolic Panel; Future  -     Lipid Panel; Future  -     Urinalysis With Microscopic If Indicated (No Culture) - Urine, Clean Catch; Future    3. Recurrent major depressive disorder, in full remission  -     Hemoglobin A1c; Future  -     CBC & Differential; Future  -     Comprehensive Metabolic Panel; Future  -     Lipid Panel; Future  -     Urinalysis With Microscopic If Indicated (No Culture) - Urine, Clean Catch; Future    4. Primary osteoarthritis involving multiple joints  -     Hemoglobin A1c; Future  -     CBC & Differential; Future  -     Comprehensive Metabolic Panel; Future  -     Lipid Panel; Future  -     Urinalysis With Microscopic If Indicated (No Culture) - Urine, Clean Catch; Future    5. Abnormal finding of blood chemistry, unspecified  -     Hemoglobin A1c; Future    Hypertension.  Better control.  Continue same.    Major depression.  Improved.  Near full remission.  Continue sertraline 1/2 tablets daily.  I will see him back in 3 to 4 months for follow-up.         Follow Up   Return in about 4 months (around 11/2/2025) for Subsequent Medicare Wellness Visit, lab work today.  Patient was given instructions  and counseling regarding his condition or for health maintenance advice. Please see specific information pulled into the AVS if appropriate.

## 2025-08-16 LAB
MC_CV_MDC_IDC_RATE_1: 150
MC_CV_MDC_IDC_RATE_1: 171
MC_CV_MDC_IDC_THERAPIES: NORMAL
MC_CV_MDC_IDC_ZONE_ID: 2
MC_CV_MDC_IDC_ZONE_ID: 6
MDC_IDC_MSMT_BATTERY_REMAINING_LONGEVITY: 47 MO
MDC_IDC_MSMT_BATTERY_RRT_TRIGGER: 2.62
MDC_IDC_MSMT_BATTERY_STATUS: NORMAL
MDC_IDC_MSMT_BATTERY_VOLTAGE: 2.98
MDC_IDC_MSMT_LEADCHNL_RA_DTM: NORMAL
MDC_IDC_MSMT_LEADCHNL_RA_IMPEDANCE_VALUE: 741
MDC_IDC_MSMT_LEADCHNL_RA_PACING_THRESHOLD_AMPLITUDE: 2
MDC_IDC_MSMT_LEADCHNL_RA_PACING_THRESHOLD_POLARITY: NORMAL
MDC_IDC_MSMT_LEADCHNL_RA_PACING_THRESHOLD_PULSEWIDTH: 0.4
MDC_IDC_MSMT_LEADCHNL_RA_SENSING_INTR_AMPL: 0.38
MDC_IDC_MSMT_LEADCHNL_RV_DTM: NORMAL
MDC_IDC_MSMT_LEADCHNL_RV_IMPEDANCE_VALUE: 399
MDC_IDC_MSMT_LEADCHNL_RV_PACING_THRESHOLD_AMPLITUDE: 1.38
MDC_IDC_MSMT_LEADCHNL_RV_PACING_THRESHOLD_POLARITY: NORMAL
MDC_IDC_MSMT_LEADCHNL_RV_PACING_THRESHOLD_PULSEWIDTH: 0.4
MDC_IDC_MSMT_LEADCHNL_RV_SENSING_INTR_AMPL: 4.62
MDC_IDC_PG_IMPLANT_DTM: NORMAL
MDC_IDC_PG_MFG: NORMAL
MDC_IDC_PG_MODEL: NORMAL
MDC_IDC_PG_SERIAL: NORMAL
MDC_IDC_PG_TYPE: NORMAL
MDC_IDC_SESS_DTM: NORMAL
MDC_IDC_SESS_TYPE: NORMAL
MDC_IDC_SET_BRADY_AT_MODE_SWITCH_RATE: 171
MDC_IDC_SET_BRADY_LOWRATE: 60
MDC_IDC_SET_BRADY_MAX_SENSOR_RATE: 130
MDC_IDC_SET_BRADY_MAX_TRACKING_RATE: 130
MDC_IDC_SET_BRADY_MODE: NORMAL
MDC_IDC_SET_BRADY_PAV_DELAY: 180
MDC_IDC_SET_BRADY_SAV_DELAY: 150
MDC_IDC_SET_LEADCHNL_RA_PACING_AMPLITUDE: 4
MDC_IDC_SET_LEADCHNL_RA_PACING_POLARITY: NORMAL
MDC_IDC_SET_LEADCHNL_RA_PACING_PULSEWIDTH: 1
MDC_IDC_SET_LEADCHNL_RA_SENSING_POLARITY: NORMAL
MDC_IDC_SET_LEADCHNL_RA_SENSING_SENSITIVITY: 0.45
MDC_IDC_SET_LEADCHNL_RV_PACING_AMPLITUDE: 2.75
MDC_IDC_SET_LEADCHNL_RV_PACING_POLARITY: NORMAL
MDC_IDC_SET_LEADCHNL_RV_PACING_PULSEWIDTH: 0.4
MDC_IDC_SET_LEADCHNL_RV_SENSING_POLARITY: NORMAL
MDC_IDC_SET_LEADCHNL_RV_SENSING_SENSITIVITY: 0.9
MDC_IDC_SET_ZONE_STATUS: NORMAL
MDC_IDC_SET_ZONE_STATUS: NORMAL
MDC_IDC_SET_ZONE_TYPE: NORMAL
MDC_IDC_SET_ZONE_TYPE: NORMAL
MDC_IDC_STAT_AT_BURDEN_PERCENT: 0
MDC_IDC_STAT_BRADY_RA_PERCENT_PACED: 98.73
MDC_IDC_STAT_BRADY_RV_PERCENT_PACED: 2.06

## (undated) DEVICE — SUT VIC 3/0 SH 27IN J416H

## (undated) DEVICE — TBG PENCL TELESCP MEGADYNE SMOKE EVAC 10FT

## (undated) DEVICE — APPL CHLORAPREP HI/LITE 26ML ORNG

## (undated) DEVICE — GLV SURG BIOGEL LTX PF 6 1/2

## (undated) DEVICE — THE PHOTONBLADE WITH ADAPTIVE SMOKE EVACUATION IS A MONOPOLAR RF DEVICE COUPLED WITH ILLUMINATION THAT IS INDICATED FOR CUTTING AND COAGULATION OF TISSUE DURING GENERAL SURGICAL PROCEDURES AND FOR REMOVING SMOKE GENERATED BY ELECTROSURGERY WHEN USED IN CONJUNCTION WITH AN EFFECTIVE SMOKE EVACUATION SYSTEM.: Brand: PHOTONBLADE WITH ADAPTIVE SMOKE EVACUATION

## (undated) DEVICE — SUT MNCRYL 4/0 PS2 18 IN

## (undated) DEVICE — LOU PACE DEFIB: Brand: MEDLINE INDUSTRIES, INC.

## (undated) DEVICE — ADHS SKIN SURG TISS VISC PREMIERPRO EXOFIN HI/VISC FAST/DRY

## (undated) DEVICE — Device

## (undated) DEVICE — PK PROC MINOR TOWER 40

## (undated) DEVICE — TRAP FLD MINIVAC MEGADYNE 100ML

## (undated) DEVICE — NDL HYPO PRECISIONGLIDE REG 25G 1 1/2